# Patient Record
Sex: FEMALE | Race: OTHER | NOT HISPANIC OR LATINO | URBAN - METROPOLITAN AREA
[De-identification: names, ages, dates, MRNs, and addresses within clinical notes are randomized per-mention and may not be internally consistent; named-entity substitution may affect disease eponyms.]

---

## 2019-03-13 ENCOUNTER — INPATIENT (INPATIENT)
Facility: HOSPITAL | Age: 84
LOS: 12 days | Discharge: NURSING, REHAB OR LONG-TERM CARE | DRG: 291 | End: 2019-03-26
Attending: INTERNAL MEDICINE | Admitting: INTERNAL MEDICINE
Payer: MEDICARE

## 2019-03-13 VITALS
RESPIRATION RATE: 18 BRPM | WEIGHT: 128.97 LBS | HEIGHT: 60 IN | TEMPERATURE: 99 F | OXYGEN SATURATION: 100 % | DIASTOLIC BLOOD PRESSURE: 70 MMHG | HEART RATE: 69 BPM | SYSTOLIC BLOOD PRESSURE: 146 MMHG

## 2019-03-13 DIAGNOSIS — A41.9 SEPSIS, UNSPECIFIED ORGANISM: ICD-10-CM

## 2019-03-13 DIAGNOSIS — I50.9 HEART FAILURE, UNSPECIFIED: ICD-10-CM

## 2019-03-13 LAB
ALBUMIN SERPL ELPH-MCNC: 2.9 G/DL — LOW (ref 3.5–5)
ALP SERPL-CCNC: 52 U/L — SIGNIFICANT CHANGE UP (ref 40–120)
ALT FLD-CCNC: 14 U/L DA — SIGNIFICANT CHANGE UP (ref 10–60)
ANION GAP SERPL CALC-SCNC: 5 MMOL/L — SIGNIFICANT CHANGE UP (ref 5–17)
ANION GAP SERPL CALC-SCNC: 5 MMOL/L — SIGNIFICANT CHANGE UP (ref 5–17)
APTT BLD: 38.1 SEC — HIGH (ref 27.5–36.3)
AST SERPL-CCNC: 15 U/L — SIGNIFICANT CHANGE UP (ref 10–40)
BILIRUB SERPL-MCNC: 0.2 MG/DL — SIGNIFICANT CHANGE UP (ref 0.2–1.2)
BUN SERPL-MCNC: 77 MG/DL — HIGH (ref 7–18)
BUN SERPL-MCNC: 77 MG/DL — HIGH (ref 7–18)
CALCIUM SERPL-MCNC: 7.9 MG/DL — LOW (ref 8.4–10.5)
CALCIUM SERPL-MCNC: 8.4 MG/DL — SIGNIFICANT CHANGE UP (ref 8.4–10.5)
CHLORIDE SERPL-SCNC: 113 MMOL/L — HIGH (ref 96–108)
CHLORIDE SERPL-SCNC: 114 MMOL/L — HIGH (ref 96–108)
CO2 SERPL-SCNC: 23 MMOL/L — SIGNIFICANT CHANGE UP (ref 22–31)
CO2 SERPL-SCNC: 23 MMOL/L — SIGNIFICANT CHANGE UP (ref 22–31)
CREAT SERPL-MCNC: 1.98 MG/DL — HIGH (ref 0.5–1.3)
CREAT SERPL-MCNC: 1.98 MG/DL — HIGH (ref 0.5–1.3)
GLUCOSE SERPL-MCNC: 91 MG/DL — SIGNIFICANT CHANGE UP (ref 70–99)
GLUCOSE SERPL-MCNC: 96 MG/DL — SIGNIFICANT CHANGE UP (ref 70–99)
HCT VFR BLD CALC: 23.2 % — LOW (ref 34.5–45)
HGB BLD-MCNC: 7 G/DL — CRITICAL LOW (ref 11.5–15.5)
INR BLD: 1.08 RATIO — SIGNIFICANT CHANGE UP (ref 0.88–1.16)
LIDOCAIN IGE QN: 206 U/L — SIGNIFICANT CHANGE UP (ref 73–393)
MCHC RBC-ENTMCNC: 30.2 GM/DL — LOW (ref 32–36)
MCHC RBC-ENTMCNC: 31.1 PG — SIGNIFICANT CHANGE UP (ref 27–34)
MCV RBC AUTO: 103.1 FL — HIGH (ref 80–100)
NRBC # BLD: 0 /100 WBCS — SIGNIFICANT CHANGE UP (ref 0–0)
NT-PROBNP SERPL-SCNC: 8592 PG/ML — HIGH (ref 0–450)
OB PNL STL: NEGATIVE — SIGNIFICANT CHANGE UP
PLATELET # BLD AUTO: 155 K/UL — SIGNIFICANT CHANGE UP (ref 150–400)
POTASSIUM SERPL-MCNC: 5.3 MMOL/L — SIGNIFICANT CHANGE UP (ref 3.5–5.3)
POTASSIUM SERPL-MCNC: 5.4 MMOL/L — HIGH (ref 3.5–5.3)
POTASSIUM SERPL-SCNC: 5.3 MMOL/L — SIGNIFICANT CHANGE UP (ref 3.5–5.3)
POTASSIUM SERPL-SCNC: 5.4 MMOL/L — HIGH (ref 3.5–5.3)
PROT SERPL-MCNC: 6.6 G/DL — SIGNIFICANT CHANGE UP (ref 6–8.3)
PROTHROM AB SERPL-ACNC: 12 SEC — SIGNIFICANT CHANGE UP (ref 10–12.9)
RBC # BLD: 2.25 M/UL — LOW (ref 3.8–5.2)
RBC # FLD: 16.7 % — HIGH (ref 10.3–14.5)
SODIUM SERPL-SCNC: 141 MMOL/L — SIGNIFICANT CHANGE UP (ref 135–145)
SODIUM SERPL-SCNC: 142 MMOL/L — SIGNIFICANT CHANGE UP (ref 135–145)
TROPONIN I SERPL-MCNC: 0.06 NG/ML — HIGH (ref 0–0.04)
WBC # BLD: 4.06 K/UL — SIGNIFICANT CHANGE UP (ref 3.8–10.5)
WBC # FLD AUTO: 4.06 K/UL — SIGNIFICANT CHANGE UP (ref 3.8–10.5)

## 2019-03-13 PROCEDURE — 71045 X-RAY EXAM CHEST 1 VIEW: CPT | Mod: 26

## 2019-03-13 PROCEDURE — 99285 EMERGENCY DEPT VISIT HI MDM: CPT

## 2019-03-13 PROCEDURE — 93970 EXTREMITY STUDY: CPT | Mod: 26

## 2019-03-13 RX ORDER — AMLODIPINE BESYLATE 2.5 MG/1
10 TABLET ORAL DAILY
Qty: 0 | Refills: 0 | Status: DISCONTINUED | OUTPATIENT
Start: 2019-03-14 | End: 2019-03-26

## 2019-03-13 RX ORDER — HYDRALAZINE HCL 50 MG
25 TABLET ORAL THREE TIMES A DAY
Qty: 0 | Refills: 0 | Status: DISCONTINUED | OUTPATIENT
Start: 2019-03-14 | End: 2019-03-26

## 2019-03-13 RX ORDER — LOSARTAN POTASSIUM 100 MG/1
2 TABLET, FILM COATED ORAL
Qty: 0 | Refills: 0 | COMMUNITY

## 2019-03-13 RX ORDER — HYDRALAZINE HCL 50 MG
1 TABLET ORAL
Qty: 0 | Refills: 0 | COMMUNITY

## 2019-03-13 RX ORDER — ALBUTEROL 90 UG/1
3 AEROSOL, METERED ORAL
Qty: 0 | Refills: 0 | COMMUNITY

## 2019-03-13 RX ORDER — LOSARTAN POTASSIUM 100 MG/1
1 TABLET, FILM COATED ORAL
Qty: 0 | Refills: 0 | COMMUNITY

## 2019-03-13 RX ORDER — FUROSEMIDE 40 MG
40 TABLET ORAL DAILY
Qty: 0 | Refills: 0 | Status: DISCONTINUED | OUTPATIENT
Start: 2019-03-14 | End: 2019-03-14

## 2019-03-13 RX ORDER — FUROSEMIDE 40 MG
40 TABLET ORAL ONCE
Qty: 0 | Refills: 0 | Status: COMPLETED | OUTPATIENT
Start: 2019-03-13 | End: 2019-03-13

## 2019-03-13 RX ORDER — IPRATROPIUM/ALBUTEROL SULFATE 18-103MCG
3 AEROSOL WITH ADAPTER (GRAM) INHALATION ONCE
Qty: 0 | Refills: 0 | Status: COMPLETED | OUTPATIENT
Start: 2019-03-13 | End: 2019-03-13

## 2019-03-13 RX ORDER — HEPARIN SODIUM 5000 [USP'U]/ML
5000 INJECTION INTRAVENOUS; SUBCUTANEOUS EVERY 8 HOURS
Qty: 0 | Refills: 0 | Status: DISCONTINUED | OUTPATIENT
Start: 2019-03-13 | End: 2019-03-14

## 2019-03-13 RX ORDER — AMLODIPINE BESYLATE 2.5 MG/1
1 TABLET ORAL
Qty: 0 | Refills: 0 | COMMUNITY

## 2019-03-13 RX ORDER — ERGOCALCIFEROL 1.25 MG/1
50000 CAPSULE ORAL
Qty: 0 | Refills: 0 | Status: DISCONTINUED | OUTPATIENT
Start: 2019-03-13 | End: 2019-03-18

## 2019-03-13 RX ORDER — SODIUM POLYSTYRENE SULFONATE 4.1 MEQ/G
15 POWDER, FOR SUSPENSION ORAL ONCE
Qty: 0 | Refills: 0 | Status: COMPLETED | OUTPATIENT
Start: 2019-03-13 | End: 2019-03-13

## 2019-03-13 RX ORDER — ALBUTEROL 90 UG/1
2 AEROSOL, METERED ORAL EVERY 6 HOURS
Qty: 0 | Refills: 0 | Status: DISCONTINUED | OUTPATIENT
Start: 2019-03-13 | End: 2019-03-26

## 2019-03-13 RX ORDER — PANTOPRAZOLE SODIUM 20 MG/1
40 TABLET, DELAYED RELEASE ORAL
Qty: 0 | Refills: 0 | Status: DISCONTINUED | OUTPATIENT
Start: 2019-03-13 | End: 2019-03-26

## 2019-03-13 RX ORDER — ACETAMINOPHEN 500 MG
650 TABLET ORAL EVERY 8 HOURS
Qty: 0 | Refills: 0 | Status: DISCONTINUED | OUTPATIENT
Start: 2019-03-13 | End: 2019-03-26

## 2019-03-13 RX ORDER — SODIUM POLYSTYRENE SULFONATE 4.1 MEQ/G
15 POWDER, FOR SUSPENSION ORAL DAILY
Qty: 0 | Refills: 0 | Status: DISCONTINUED | OUTPATIENT
Start: 2019-03-13 | End: 2019-03-13

## 2019-03-13 RX ORDER — ACETAMINOPHEN 500 MG
2 TABLET ORAL
Qty: 0 | Refills: 0 | COMMUNITY

## 2019-03-13 RX ADMIN — Medication 650 MILLIGRAM(S): at 22:00

## 2019-03-13 RX ADMIN — Medication 40 MILLIGRAM(S): at 13:20

## 2019-03-13 RX ADMIN — Medication 650 MILLIGRAM(S): at 21:12

## 2019-03-13 RX ADMIN — Medication 3 MILLILITER(S): at 13:20

## 2019-03-13 NOTE — H&P ADULT - HISTORY OF PRESENT ILLNESS
89 y/o F from [facility] assisted living w/ PMHx of DVT, PE, CVA presents to ED due to shortness of breath, chest pain for 2 days. Pt reports she is treated every 4 hours, and that shortness of breath started 1 month ago once she moved to [facility]. Pt also complaints of bilateral leg swelling worsening over the past 1 month. Pt on lasix for CHF, abl eto ambulate with walker. Pt otherwise denies dark tarry stools, nausea, vomiting, diarrhea, all other ROS negative.  Pt is on Lasix for CHF. 89 y/o F from [facility] assisted living w/ PMHx of GERD, anemia, colitis, rectal bleeding, chronic ankle swelling, presents to ED due to shortness of breath for 2 days. Pt reports she is treated every 4 hours with albuterol nebulizers in the past 2 days, and that shortness of breath started 1 month ago once she moved to [facility]. Pt also complaints of bilateral leg swelling worsening over the past 1 month. Per daughter, patient has had hx of anemia, with colonoscopy 8 years ago, however daughter does not remember results, and does not recall that patient was continued on any iron supplements.  Patient had had an episode of bright red blood per rectum several months ago, however denying any current episode, denying dark tarry stools, nausea, vomiting, diarrhea, all others ROS negaitve.  Pt on lasix for chornic bilateral ankle swelling, however unclear if she has hx of CHF as daughter does not know. Pt able to ambulate with walker. Daughter states patient PCP prior to [facility] is Dr. Aleman who would know further information, contact numbers listed above.

## 2019-03-13 NOTE — H&P ADULT - NSICDXFAMILYHX_GEN_ALL_CORE_FT
No pertinent family history in first degree relatives FAMILY HISTORY:  No pertinent family history in first degree relatives

## 2019-03-13 NOTE — H&P ADULT - ASSESSMENT
Patient admitted for shortness of breath, likely 2/2 CHF exacerbation given elevated proBNP and bilateral pleural effusions, patient also found to have symptomatic anemia, however guaiac negative Patient admitted for shortness of breath, likely 2/2 CHF exacerbation given elevated proBNP and bilateral pleural effusions, patient also found to have symptomatic anemia, however guaiac negative.    Pt is FULL CODE Patient admitted for shortness of breath, likely 2/2 CHF exacerbation given elevated proBNP and bilateral pleural effusions, patient also found to have symptomatic anemia, however guaiac negative, macrocytic, f/u B12 and folate levels in AM    Pt is FULL CODE    PRIMARY TEAM TO CALL PREVIOUS PCP DR. ALEMAN IN AM FOR FURTHER MEDICAL HISTORY INFORMATION AS WELL AS BASELINE CREATININE  Dr. Aleman [phone], [phone]      Problem 1: dyspnea  acute CHF exacerbation vs COPD, unknown if pt smoked in the past, however currently denying  s/p 40mg IV lasix, c/w 40mg IV lasix daily for now  c/w duonebs q6h  CXR shows bilateral pleural effusions, elevated proBNP on admission  continue to monitor BMP and fluid status  c/w telemetry monitoring for now  initial troponin 0.063  f/u T2, T3  f/u echocardiogram  f/u cardiology consult - Dr. Medina  f/u pulmonology consult - Dr. Dumont    Problem 2: symptomatic anemia  hemoglobin 7 on admission, guaiac negative, macrocytic on admission CBC, may indicate Vitamin B12 vs folate deficiency  s/p 1U PRBC transfusion (started prior to anemia panel collection)  f/u repeat CBC post transfusion at 12 AM  f/u CBC in AM  f/u Vit B12, folate in AM    Problem 3: HTN  c/w hydralazine TID, amlodipine, holding losartan given creatinine 1.77, unknown baseline  continue to monitor BP    Problem 4: CKD  DENEEN on CKD vs worsening CKD?  unknown baseline creatinine  will continue with IV diuresis as above  f/u urine lytes  f/u BMP in AM  PRIMARY TEAM TO ESTABLISH BASELINE CREATININE WITH PREVIOUS PCP Dr. Aleman as above    Problem 5: GERD  c/w protonix daily    Problem 6: Need for prophylactic measure  IMPROVE VTE Individual Risk Assessment          RISK                                                          Points  [  ] Previous VTE                                                3  [  ] Thrombophilia                                             2  [ x ] Lower limb paralysis                                   2        (unable to hold up >15 seconds)    [  ] Current Cancer                                             2         (within 6 months)  [ x ] Immobilization > 24 hrs                              1  [  ] ICU/CCU stay > 24 hours                             1  [ x ] Age > 60                                                         1    IMPROVE VTE Score: 4    c/w heparin for vte prophylaxis

## 2019-03-13 NOTE — H&P ADULT - NSHPLABSRESULTS_GEN_ALL_CORE
LABS:      CBC Full  -  ( 13 Mar 2019 13:16 )  WBC Count : 4.06 K/uL  Hemoglobin : 7.0 g/dL  Hematocrit : 23.2 %  Platelet Count - Automated : 155 K/uL  Mean Cell Volume : 103.1 fl  Mean Cell Hemoglobin : 31.1 pg  Mean Cell Hemoglobin Concentration : 30.2 gm/dL  Auto Neutrophil # : x  Auto Lymphocyte # : x  Auto Monocyte # : x  Auto Eosinophil # : x  Auto Basophil # : x  Auto Neutrophil % : x  Auto Lymphocyte % : x  Auto Monocyte % : x  Auto Eosinophil % : x  Auto Basophil % : x    03-13    142  |  114<H>  |  77<H>  ----------------------------<  96  5.4<H>   |  23  |  1.98<H>    Ca    7.9<L>      13 Mar 2019 13:16    TPro  6.6  /  Alb  2.9<L>  /  TBili  0.2  /  DBili  x   /  AST  15  /  ALT  14  /  AlkPhos  52  03-13    PT/INR - ( 13 Mar 2019 13:16 )   PT: 12.0 sec;   INR: 1.08 ratio       PTT - ( 13 Mar 2019 13:16 )  PTT:38.1 sec    RADIOLOGY & ADDITIONAL STUDIES (The following images were personally reviewed):    EXAM:  XR CHEST AP OR PA 1V                          PROCEDURE DATE:  03/13/2019      INTERPRETATION:  CLINICAL STATEMENT: Chest pain.    TECHNIQUE: AP view of the chest.    COMPARISON: None available.    FINDINGS/  IMPRESSION:  Increased interstitial lung markings with nonspecific prominence right   hilum which could be related to pulmonary vasculature. Underlying mass or   lymphadenopathy not excluded.    Small bilateral pleural effusions with adjacent airspace opacities.    Heart size cannot be accurately assessed in this projection, but appear   enlarged.

## 2019-03-13 NOTE — ED ADULT NURSE NOTE - NSIMPLEMENTINTERV_GEN_ALL_ED
Implemented All Universal Safety Interventions:  [place] to call system. Call bell, personal items and telephone within reach. Instruct patient to call for assistance. Room bathroom lighting operational. Non-slip footwear when patient is off stretcher. Physically safe environment: no spills, clutter or unnecessary equipment. Stretcher in lowest position, wheels locked, appropriate side rails in place.

## 2019-03-13 NOTE — ED PROVIDER NOTE - PROGRESS NOTE DETAILS
Pt's BNP severely elevated with CHF findings on CXR--Lasix IV given.  Troponin elevated with no ischemic abnormality on EKG.  Also with severe anemia.  D/w Pt and daughter (the HCP) and initially they had reservations about blood transfusion but at about 3:30 pm, they agree to consent for transfusion.  Given the aforementioned, will hold ASA.  Not suspecting ACS.  Informed Dr. Baum. Dr. Maloney, MAR, informed.

## 2019-03-13 NOTE — H&P ADULT - NSHPPHYSICALEXAM_GEN_ALL_CORE
Vital Signs Last 24 Hrs  T(C): 36.2 (13 Mar 2019 15:26), Max: 37 (13 Mar 2019 11:40)  T(F): 97.1 (13 Mar 2019 15:26), Max: 98.6 (13 Mar 2019 11:40)  HR: 66 (13 Mar 2019 17:41) (61 - 69)  BP: 141/75 (13 Mar 2019 17:41) (137/54 - 146/70)  RR: 17 (13 Mar 2019 17:41) (16 - 18)  SpO2: 100% (13 Mar 2019 17:41) (100% - 100%)  ________________________________________________  PHYSICAL EXAM:  GENERAL: NAD  HEENT: Normocephalic;  conjunctivae and sclerae clear; moist mucous membranes;   NECK : supple, no JVD  CHEST/LUNG: right-sided inspiratory crackles   HEART: S1 S2  regular; no murmurs, gallops or rubs  ABDOMEN: Soft, Nontender, Nondistended; Bowel sounds present  EXTREMITIES: no cyanosis; no edema; no calf tenderness  NERVOUS SYSTEM:  Awake and alert; Oriented  to place, person Vital Signs Last 24 Hrs  T(C): 36.2 (13 Mar 2019 15:26), Max: 37 (13 Mar 2019 11:40)  T(F): 97.1 (13 Mar 2019 15:26), Max: 98.6 (13 Mar 2019 11:40)  HR: 66 (13 Mar 2019 17:41) (61 - 69)  BP: 141/75 (13 Mar 2019 17:41) (137/54 - 146/70)  RR: 17 (13 Mar 2019 17:41) (16 - 18)  SpO2: 100% (13 Mar 2019 17:41) (100% - 100%)  ________________________________________________  PHYSICAL EXAM:  GENERAL: NAD  HEENT: Normocephalic;  conjunctivae and sclerae clear; moist mucous membranes;   NECK : supple, no JVD  CHEST/LUNG: right-sided inspiratory crackles, no wheezing  HEART: S1 S2  regular; no murmurs, gallops or rubs  ABDOMEN: Soft, Nontender, Nondistended; Bowel sounds present  EXTREMITIES: no cyanosis; trace bilateral pitting LE edema; no calf tenderness  NERVOUS SYSTEM:  Awake and alert; Oriented  to place, person

## 2019-03-13 NOTE — ED PROVIDER NOTE - PMH
CHF (congestive heart failure)    CVA (cerebral vascular accident)    DVT (deep venous thrombosis)    Pulmonary embolism

## 2019-03-13 NOTE — H&P ADULT - NSICDXPASTMEDICALHX_GEN_ALL_CORE_FT
PAST MEDICAL HISTORY:  CHF (congestive heart failure)     CVA (cerebral vascular accident)     DVT (deep venous thrombosis)     Pulmonary embolism PAST MEDICAL HISTORY:  Anemia     CHF (congestive heart failure)     Colitis     H/O gastroesophageal reflux (GERD)     Rectal bleeding

## 2019-03-13 NOTE — ED PROVIDER NOTE - CARE PLAN
Principal Discharge DX:	Acute on chronic congestive heart failure, unspecified heart failure type  Secondary Diagnosis:	Severe anemia  Secondary Diagnosis:	Hyperkalemia  Secondary Diagnosis:	Renal insufficiency

## 2019-03-13 NOTE — H&P ADULT - ATTENDING COMMENTS
Patient is seen and examined. Case reviewed with the medical team. Above note is appreciated. Will follow up clinically. Continue DVT prophylaxis. Will follow up clinically with cardiology and pulmonary.

## 2019-03-14 LAB
24R-OH-CALCIDIOL SERPL-MCNC: 31 NG/ML — SIGNIFICANT CHANGE UP (ref 30–80)
ANION GAP SERPL CALC-SCNC: 5 MMOL/L — SIGNIFICANT CHANGE UP (ref 5–17)
ANION GAP SERPL CALC-SCNC: 6 MMOL/L — SIGNIFICANT CHANGE UP (ref 5–17)
ANION GAP SERPL CALC-SCNC: 6 MMOL/L — SIGNIFICANT CHANGE UP (ref 5–17)
APPEARANCE UR: CLEAR — SIGNIFICANT CHANGE UP
BASE EXCESS BLDA CALC-SCNC: -5 MMOL/L — LOW (ref -2–2)
BASE EXCESS BLDA CALC-SCNC: -6.6 MMOL/L — LOW (ref -2–2)
BILIRUB UR-MCNC: NEGATIVE — SIGNIFICANT CHANGE UP
BLOOD GAS COMMENTS ARTERIAL: SIGNIFICANT CHANGE UP
BUN SERPL-MCNC: 77 MG/DL — HIGH (ref 7–18)
BUN SERPL-MCNC: 81 MG/DL — HIGH (ref 7–18)
BUN SERPL-MCNC: 81 MG/DL — HIGH (ref 7–18)
CALCIUM SERPL-MCNC: 8.2 MG/DL — LOW (ref 8.4–10.5)
CALCIUM SERPL-MCNC: 8.4 MG/DL — SIGNIFICANT CHANGE UP (ref 8.4–10.5)
CALCIUM SERPL-MCNC: 8.6 MG/DL — SIGNIFICANT CHANGE UP (ref 8.4–10.5)
CHLORIDE SERPL-SCNC: 112 MMOL/L — HIGH (ref 96–108)
CHLORIDE SERPL-SCNC: 114 MMOL/L — HIGH (ref 96–108)
CHLORIDE SERPL-SCNC: 114 MMOL/L — HIGH (ref 96–108)
CHLORIDE UR-SCNC: 90 MMOL/L — SIGNIFICANT CHANGE UP (ref 55–125)
CHOLEST SERPL-MCNC: 146 MG/DL — SIGNIFICANT CHANGE UP (ref 10–199)
CK MB BLD-MCNC: 2.8 % — SIGNIFICANT CHANGE UP (ref 0–3.5)
CK MB CFR SERPL CALC: 3.7 NG/ML — HIGH (ref 0–3.6)
CK SERPL-CCNC: 132 U/L — SIGNIFICANT CHANGE UP (ref 21–215)
CO2 SERPL-SCNC: 22 MMOL/L — SIGNIFICANT CHANGE UP (ref 22–31)
CO2 SERPL-SCNC: 23 MMOL/L — SIGNIFICANT CHANGE UP (ref 22–31)
CO2 SERPL-SCNC: 23 MMOL/L — SIGNIFICANT CHANGE UP (ref 22–31)
COLOR SPEC: YELLOW — SIGNIFICANT CHANGE UP
CREAT SERPL-MCNC: 2.03 MG/DL — HIGH (ref 0.5–1.3)
CREAT SERPL-MCNC: 2.04 MG/DL — HIGH (ref 0.5–1.3)
CREAT SERPL-MCNC: 2.08 MG/DL — HIGH (ref 0.5–1.3)
DIFF PNL FLD: NEGATIVE — SIGNIFICANT CHANGE UP
FOLATE SERPL-MCNC: 18.7 NG/ML — SIGNIFICANT CHANGE UP
GLUCOSE BLDC GLUCOMTR-MCNC: 76 MG/DL — SIGNIFICANT CHANGE UP (ref 70–99)
GLUCOSE SERPL-MCNC: 78 MG/DL — SIGNIFICANT CHANGE UP (ref 70–99)
GLUCOSE SERPL-MCNC: 85 MG/DL — SIGNIFICANT CHANGE UP (ref 70–99)
GLUCOSE SERPL-MCNC: 92 MG/DL — SIGNIFICANT CHANGE UP (ref 70–99)
GLUCOSE UR QL: NEGATIVE — SIGNIFICANT CHANGE UP
HBA1C BLD-MCNC: 4.8 % — SIGNIFICANT CHANGE UP (ref 4–5.6)
HCO3 BLDA-SCNC: 21 MMOL/L — LOW (ref 23–27)
HCO3 BLDA-SCNC: 21 MMOL/L — LOW (ref 23–27)
HCT VFR BLD CALC: 29.9 % — LOW (ref 34.5–45)
HCT VFR BLD CALC: 30.7 % — LOW (ref 34.5–45)
HDLC SERPL-MCNC: 103 MG/DL — SIGNIFICANT CHANGE UP
HGB BLD-MCNC: 9 G/DL — LOW (ref 11.5–15.5)
HGB BLD-MCNC: 9.3 G/DL — LOW (ref 11.5–15.5)
HOROWITZ INDEX BLDA+IHG-RTO: 50 — SIGNIFICANT CHANGE UP
HOROWITZ INDEX BLDA+IHG-RTO: 50 — SIGNIFICANT CHANGE UP
INR BLD: 1.04 RATIO — SIGNIFICANT CHANGE UP (ref 0.88–1.16)
KETONES UR-MCNC: NEGATIVE — SIGNIFICANT CHANGE UP
LEUKOCYTE ESTERASE UR-ACNC: NEGATIVE — SIGNIFICANT CHANGE UP
LIPID PNL WITH DIRECT LDL SERPL: 38 MG/DL — SIGNIFICANT CHANGE UP
MAGNESIUM SERPL-MCNC: 2.5 MG/DL — SIGNIFICANT CHANGE UP (ref 1.6–2.6)
MAGNESIUM SERPL-MCNC: 2.6 MG/DL — SIGNIFICANT CHANGE UP (ref 1.6–2.6)
MAGNESIUM SERPL-MCNC: 2.6 MG/DL — SIGNIFICANT CHANGE UP (ref 1.6–2.6)
MCHC RBC-ENTMCNC: 30.1 GM/DL — LOW (ref 32–36)
MCHC RBC-ENTMCNC: 30.3 GM/DL — LOW (ref 32–36)
MCHC RBC-ENTMCNC: 30.3 PG — SIGNIFICANT CHANGE UP (ref 27–34)
MCHC RBC-ENTMCNC: 30.5 PG — SIGNIFICANT CHANGE UP (ref 27–34)
MCV RBC AUTO: 100.7 FL — HIGH (ref 80–100)
MCV RBC AUTO: 100.7 FL — HIGH (ref 80–100)
NITRITE UR-MCNC: NEGATIVE — SIGNIFICANT CHANGE UP
NRBC # BLD: 0 /100 WBCS — SIGNIFICANT CHANGE UP (ref 0–0)
NRBC # BLD: 0 /100 WBCS — SIGNIFICANT CHANGE UP (ref 0–0)
OSMOLALITY UR: 350 MOS/KG — SIGNIFICANT CHANGE UP (ref 50–1200)
PCO2 BLDA: 43 MMHG — SIGNIFICANT CHANGE UP (ref 32–46)
PCO2 BLDA: 54 MMHG — HIGH (ref 32–46)
PH BLDA: 7.21 — LOW (ref 7.35–7.45)
PH BLDA: 7.31 — LOW (ref 7.35–7.45)
PH UR: 5 — SIGNIFICANT CHANGE UP (ref 5–8)
PHOSPHATE SERPL-MCNC: 5.7 MG/DL — HIGH (ref 2.5–4.5)
PHOSPHATE SERPL-MCNC: 5.9 MG/DL — HIGH (ref 2.5–4.5)
PHOSPHATE SERPL-MCNC: 6.1 MG/DL — HIGH (ref 2.5–4.5)
PLATELET # BLD AUTO: 160 K/UL — SIGNIFICANT CHANGE UP (ref 150–400)
PLATELET # BLD AUTO: 168 K/UL — SIGNIFICANT CHANGE UP (ref 150–400)
PO2 BLDA: 138 MMHG — HIGH (ref 74–108)
PO2 BLDA: 168 MMHG — HIGH (ref 74–108)
POTASSIUM SERPL-MCNC: 5.1 MMOL/L — SIGNIFICANT CHANGE UP (ref 3.5–5.3)
POTASSIUM SERPL-MCNC: 5.4 MMOL/L — HIGH (ref 3.5–5.3)
POTASSIUM SERPL-MCNC: 5.5 MMOL/L — HIGH (ref 3.5–5.3)
POTASSIUM SERPL-SCNC: 5.1 MMOL/L — SIGNIFICANT CHANGE UP (ref 3.5–5.3)
POTASSIUM SERPL-SCNC: 5.4 MMOL/L — HIGH (ref 3.5–5.3)
POTASSIUM SERPL-SCNC: 5.5 MMOL/L — HIGH (ref 3.5–5.3)
PROT ?TM UR-MCNC: 33 MG/DL — HIGH (ref 0–12)
PROT UR-MCNC: 30 MG/DL
PROTHROM AB SERPL-ACNC: 11.6 SEC — SIGNIFICANT CHANGE UP (ref 10–12.9)
RBC # BLD: 2.97 M/UL — LOW (ref 3.8–5.2)
RBC # BLD: 3.05 M/UL — LOW (ref 3.8–5.2)
RBC # FLD: 17.8 % — HIGH (ref 10.3–14.5)
RBC # FLD: 18.3 % — HIGH (ref 10.3–14.5)
SAO2 % BLDA: 99 % — HIGH (ref 92–96)
SAO2 % BLDA: 99 % — HIGH (ref 92–96)
SODIUM SERPL-SCNC: 141 MMOL/L — SIGNIFICANT CHANGE UP (ref 135–145)
SODIUM SERPL-SCNC: 142 MMOL/L — SIGNIFICANT CHANGE UP (ref 135–145)
SODIUM SERPL-SCNC: 142 MMOL/L — SIGNIFICANT CHANGE UP (ref 135–145)
SODIUM UR-SCNC: 77 MMOL/L — SIGNIFICANT CHANGE UP (ref 40–220)
SP GR SPEC: 1.01 — SIGNIFICANT CHANGE UP (ref 1.01–1.02)
TOTAL CHOLESTEROL/HDL RATIO MEASUREMENT: 1.4 RATIO — LOW (ref 3.3–7.1)
TRIGL SERPL-MCNC: 25 MG/DL — SIGNIFICANT CHANGE UP (ref 10–149)
TROPONIN I SERPL-MCNC: 0.06 NG/ML — HIGH (ref 0–0.04)
TSH SERPL-MCNC: 3.74 UU/ML — SIGNIFICANT CHANGE UP (ref 0.34–4.82)
UROBILINOGEN FLD QL: NEGATIVE — SIGNIFICANT CHANGE UP
VIT B12 SERPL-MCNC: 1029 PG/ML — SIGNIFICANT CHANGE UP (ref 232–1245)
WBC # BLD: 4.24 K/UL — SIGNIFICANT CHANGE UP (ref 3.8–10.5)
WBC # BLD: 5.39 K/UL — SIGNIFICANT CHANGE UP (ref 3.8–10.5)
WBC # FLD AUTO: 4.24 K/UL — SIGNIFICANT CHANGE UP (ref 3.8–10.5)
WBC # FLD AUTO: 5.39 K/UL — SIGNIFICANT CHANGE UP (ref 3.8–10.5)

## 2019-03-14 PROCEDURE — 93306 TTE W/DOPPLER COMPLETE: CPT | Mod: 26

## 2019-03-14 PROCEDURE — 71045 X-RAY EXAM CHEST 1 VIEW: CPT | Mod: 26

## 2019-03-14 RX ORDER — FUROSEMIDE 40 MG
40 TABLET ORAL ONCE
Qty: 0 | Refills: 0 | Status: COMPLETED | OUTPATIENT
Start: 2019-03-14 | End: 2019-03-14

## 2019-03-14 RX ORDER — ALBUTEROL 90 UG/1
2.5 AEROSOL, METERED ORAL
Qty: 0 | Refills: 0 | Status: COMPLETED | OUTPATIENT
Start: 2019-03-14 | End: 2019-03-14

## 2019-03-14 RX ORDER — DEXTROSE 50 % IN WATER 50 %
50 SYRINGE (ML) INTRAVENOUS ONCE
Qty: 0 | Refills: 0 | Status: COMPLETED | OUTPATIENT
Start: 2019-03-14 | End: 2019-03-14

## 2019-03-14 RX ORDER — SODIUM POLYSTYRENE SULFONATE 4.1 MEQ/G
30 POWDER, FOR SUSPENSION ORAL ONCE
Qty: 0 | Refills: 0 | Status: COMPLETED | OUTPATIENT
Start: 2019-03-14 | End: 2019-03-14

## 2019-03-14 RX ORDER — FUROSEMIDE 40 MG
40 TABLET ORAL EVERY 12 HOURS
Qty: 0 | Refills: 0 | Status: DISCONTINUED | OUTPATIENT
Start: 2019-03-14 | End: 2019-03-17

## 2019-03-14 RX ORDER — CALCIUM ACETATE 667 MG
667 TABLET ORAL
Qty: 0 | Refills: 0 | Status: DISCONTINUED | OUTPATIENT
Start: 2019-03-14 | End: 2019-03-26

## 2019-03-14 RX ORDER — EPINEPHRINE 11.25MG/ML
0.5 SOLUTION, NON-ORAL INHALATION ONCE
Qty: 0 | Refills: 0 | Status: COMPLETED | OUTPATIENT
Start: 2019-03-14 | End: 2019-03-14

## 2019-03-14 RX ORDER — DEXTROSE 50 % IN WATER 50 %
25 SYRINGE (ML) INTRAVENOUS ONCE
Qty: 0 | Refills: 0 | Status: COMPLETED | OUTPATIENT
Start: 2019-03-14 | End: 2019-03-14

## 2019-03-14 RX ORDER — CHLORHEXIDINE GLUCONATE 213 G/1000ML
1 SOLUTION TOPICAL EVERY 12 HOURS
Qty: 0 | Refills: 0 | Status: DISCONTINUED | OUTPATIENT
Start: 2019-03-14 | End: 2019-03-26

## 2019-03-14 RX ORDER — CALCIUM GLUCONATE 100 MG/ML
1 VIAL (ML) INTRAVENOUS ONCE
Qty: 0 | Refills: 0 | Status: COMPLETED | OUTPATIENT
Start: 2019-03-14 | End: 2019-03-14

## 2019-03-14 RX ORDER — INSULIN HUMAN 100 [IU]/ML
10 INJECTION, SOLUTION SUBCUTANEOUS ONCE
Qty: 0 | Refills: 0 | Status: COMPLETED | OUTPATIENT
Start: 2019-03-14 | End: 2019-03-14

## 2019-03-14 RX ORDER — HEPARIN SODIUM 5000 [USP'U]/ML
5000 INJECTION INTRAVENOUS; SUBCUTANEOUS EVERY 12 HOURS
Qty: 0 | Refills: 0 | Status: DISCONTINUED | OUTPATIENT
Start: 2019-03-14 | End: 2019-03-26

## 2019-03-14 RX ADMIN — HEPARIN SODIUM 5000 UNIT(S): 5000 INJECTION INTRAVENOUS; SUBCUTANEOUS at 18:30

## 2019-03-14 RX ADMIN — Medication 40 MILLIGRAM(S): at 18:30

## 2019-03-14 RX ADMIN — Medication 40 MILLIGRAM(S): at 09:49

## 2019-03-14 RX ADMIN — ALBUTEROL 2.5 MILLIGRAM(S): 90 AEROSOL, METERED ORAL at 10:17

## 2019-03-14 RX ADMIN — Medication 0.5 MILLILITER(S): at 11:01

## 2019-03-14 RX ADMIN — ALBUTEROL 2.5 MILLIGRAM(S): 90 AEROSOL, METERED ORAL at 09:48

## 2019-03-14 RX ADMIN — CHLORHEXIDINE GLUCONATE 1 APPLICATION(S): 213 SOLUTION TOPICAL at 18:30

## 2019-03-14 RX ADMIN — Medication 25 MILLILITER(S): at 10:33

## 2019-03-14 NOTE — CONSULT NOTE ADULT - SUBJECTIVE AND OBJECTIVE BOX
Patient is a 89y old  Female who presents with a chief complaint of shortness of breath (14 Mar 2019 09:44)      INTERVAL HPI/ OVERNIGHT EVENTS: HPI:  89 y/o F from [facility] assisted living w/ PMHx of GERD, anemia, colitis, rectal bleeding, chronic ankle swelling, presents to ED due to shortness of breath for 2 days. Pt reports she is treated every 4 hours with albuterol nebulizers in the past 2 days, and that shortness of breath started 1 month ago once she moved to [facility]. Pt also complaints of bilateral leg swelling worsening over the past 1 month. Per daughter, patient has had hx of anemia, with colonoscopy 8 years ago, however daughter does not remember results, and does not recall that patient was continued on any iron supplements.  Patient had had an episode of bright red blood per rectum several months ago, however denying any current episode, denying dark tarry stools, nausea, vomiting, diarrhea, all others ROS negative  Pt on lasix for chornic bilateral ankle swelling, however unclear if she has hx of CHF as daughter does not know. Pt able to ambulate with walker. Daughter states patient PCP prior to [facility] is Dr. Aleman who would know further information, contact numbers listed above. (13 Mar 2019 18:43)    HOSPITAL COURSE: Pt was admitted yesterday for evaluation of SOB, and was being managed for pulmonary edema with IV lasix. Pt has been refusing to take oral medications sice admission. This AM pt refused to take IV lasix. Later on she was agitated and was refusing to take any medications including IV and oral. Pt got 1 dose of 40 mg of IV lasix around 945 AM. Pt also refused to take medications for hyperkalemia and so K levels are persistently elevated. Pt later became more lethargic and was having expiratory wheeze on exam, finger stick was checked which was 76mg/dl. Pt was given 25 gm of D50 and was given duoneb nebs x3 and pt became more awake. ABG was performed which s/o ABG - pH, Arterial: 7.21  pH, Blood: x     /  pCO2: 54    /  pO2: 138   / HCO3: 21    / Base Excess: -6.6  /  SaO2: 99  s/o  hypercapneic resp failure with uncompensated respiratory acidosis.   Pt will be placed on BIPAP for Hypercapnic resp failure likely secondary to pulmonary edema.   Pt will be transferred to ICU for further management.     T(C): 36.7 (03-14-19 @ 09:47), Max: 37 (03-13-19 @ 11:40)  HR: 68 (03-14-19 @ 10:19) (61 - 69)  BP: 175/55 (03-14-19 @ 10:19) (137/54 - 187/56)  RR: 22 (03-14-19 @ 10:19) (16 - 22)  SpO2: 100% (03-14-19 @ 10:19) (90% - 100%)  Wt(kg): --  I&O's Summary      PAST MEDICAL & SURGICAL HISTORY:  Rectal bleeding  H/O gastroesophageal reflux (GERD)  Colitis  Anemia  CHF (congestive heart failure)  No significant past surgical history        LABS:                        9.3    4.24  )-----------( 168      ( 14 Mar 2019 06:39 )             30.7     03-14    141  |  112<H>  |  77<H>  ----------------------------<  78  5.4<H>   |  23  |  2.03<H>    Ca    8.6      14 Mar 2019 06:39  Phos  6.1     03-14  Mg     2.6     03-14    TPro  6.6  /  Alb  2.9<L>  /  TBili  0.2  /  DBili  x   /  AST  15  /  ALT  14  /  AlkPhos  52  03-13    PT/INR - ( 13 Mar 2019 13:16 )   PT: 12.0 sec;   INR: 1.08 ratio         PTT - ( 13 Mar 2019 13:16 )  PTT:38.1 sec    CAPILLARY BLOOD GLUCOSE      POCT Blood Glucose.: 76 mg/dL (14 Mar 2019 10:15)    ABG - ( 14 Mar 2019 10:36 )  pH, Arterial: 7.21  pH, Blood: x     /  pCO2: 54    /  pO2: 138   / HCO3: 21    / Base Excess: -6.6  /  SaO2: 99                      MEDICATIONS  (STANDING):  amLODIPine   Tablet 10 milliGRAM(s) Oral daily  calcium acetate 667 milliGRAM(s) Oral four times a day with meals  ergocalciferol 11221 Unit(s) Oral <User Schedule>  furosemide   Injectable 40 milliGRAM(s) IV Push daily  heparin  Injectable 5000 Unit(s) SubCutaneous every 12 hours  hydrALAZINE 25 milliGRAM(s) Oral three times a day  methylPREDNISolone sodium succinate Injectable 40 milliGRAM(s) IV Push once  pantoprazole    Tablet 40 milliGRAM(s) Oral before breakfast    MEDICATIONS  (PRN):  acetaminophen   Tablet .. 650 milliGRAM(s) Oral every 8 hours PRN Moderate Pain (4 - 6)  ALBUTerol    90 MICROgram(s) HFA Inhaler 2 Puff(s) Inhalation every 6 hours PRN Shortness of Breath and/or Wheezing      REVIEW OF SYSTEMS:  Pt is drowsy but able to follow commands, denies any SOB though she is using accessory muscles.  Denies any chest pain, dizziness, abdominal pain, headache.     RADIOLOGY & ADDITIONAL TESTS: < from: Xray Chest 1 View AP/PA (03.13.19 @ 12:53) >  EXAM:  XR CHEST AP OR PA 1V                            PROCEDURE DATE:  03/13/2019          INTERPRETATION:  CLINICAL STATEMENT: Chest pain.    TECHNIQUE: AP view of the chest.    COMPARISON: None available.    FINDINGS/  IMPRESSION:  Increased interstitial lung markings with nonspecific prominence right   hilum which could be related to pulmonary vasculature. Underlying mass or   lymphadenopathy not excluded.    Small bilateral pleural effusions with adjacent airspace opacities.    Heart size cannot be accurately assessed in this projection, but appear   enlarged.    < end of copied text >      Imaging Personally Reviewed:  [x ] YES  [ ] NO    Consultant(s) Notes Reviewed:  [x ] YES  [ ] NO    PHYSICAL EXAM:  GENERAL: drowsy arousable, in mild- moderate respiratory distress.  HEAD:  Atraumatic, Normocephalic  EYES: pupils mid dilated and reactive to light bilaterally  ENMT: Moist mucous membranes  NECK: Supple  NERVOUS SYSTEM:  drowsy, arousable, able to follow commands, no focal neuro deficits.  CHEST/LUNG: Bilaterally air entry+, bilateral basal rales with expiratory wheeze  HEART: Regular rate and rhythm;  ABDOMEN: Soft, Nontender, Nondistended; Bowel sounds present  EXTREMITIES:  2+ Peripheral Pulses, bilateral 2+ pitting pedal edema with erythema noted Patient is a 89y old  Female who presents with a chief complaint of shortness of breath (14 Mar 2019 09:44)    INTERVAL HPI/ OVERNIGHT EVENTS: HPI:  89 y/o F from [facility] assisted living w/ PMHx of GERD, anemia, colitis, rectal bleeding, chronic ankle swelling, presents to ED due to shortness of breath for 2 days. Pt reports she is treated every 4 hours with albuterol nebulizers in the past 2 days, and that shortness of breath started 1 month ago once she moved to [facility]. Pt also complaints of bilateral leg swelling worsening over the past 1 month. Per daughter, patient has had hx of anemia, with colonoscopy 8 years ago, however daughter does not remember results, and does not recall that patient was continued on any iron supplements.  Patient had had an episode of bright red blood per rectum several months ago, however denying any current episode, denying dark tarry stools, nausea, vomiting, diarrhea, all others ROS negative  Pt on lasix for chornic bilateral ankle swelling, however unclear if she has hx of CHF as daughter does not know. Pt able to ambulate with walker. Daughter states patient PCP prior to [facility] is Dr. Aleman who would know further information, contact numbers listed above. (13 Mar 2019 18:43)    HOSPITAL COURSE: Pt was admitted yesterday for evaluation of SOB, and was being managed for pulmonary edema with IV lasix. Pt has been refusing to take oral medications since admission. This AM pt refused to take IV lasix. Later on she was agitated and was refusing to take any medications including IV and oral. Pt got 1 dose of 40 mg of IV lasix around 945 AM. Pt also refused to take medications for hyperkalemia and so K levels are persistently elevated. Pt later became more lethargic and was having expiratory wheeze on exam, finger stick was checked which was 76mg/dl. Pt was given 25 gm of D50 and was given duoneb nebs x3 and pt became more awake. ABG was performed which s/o ABG - pH, Arterial: 7.21  pH, Blood: x     /  pCO2: 54    /  pO2: 138   / HCO3: 21    / Base Excess: -6.6  /  SaO2: 99  s/o  hypercapneic resp failure with uncompensated respiratory acidosis.   Pt will be placed on BIPAP for Hypercapnic resp failure likely secondary to pulmonary edema.   Pt will be transferred to ICU for further management.     T(C): 36.7 (03-14-19 @ 09:47), Max: 37 (03-13-19 @ 11:40)  HR: 68 (03-14-19 @ 10:19) (61 - 69)  BP: 175/55 (03-14-19 @ 10:19) (137/54 - 187/56)  RR: 22 (03-14-19 @ 10:19) (16 - 22)  SpO2: 100% (03-14-19 @ 10:19) (90% - 100%)  Wt(kg): --  I&O's Summary    PAST MEDICAL & SURGICAL HISTORY:  Rectal bleeding  H/O gastroesophageal reflux (GERD)  Colitis  Anemia  CHF (congestive heart failure)  No significant past surgical history    FAMILY HISTORY:  No pertinent family history in first degree relatives    SOCIAL HISTORY: denies smoking, alcohol, illicit drug use    LABS:                        9.3    4.24  )-----------( 168      ( 14 Mar 2019 06:39 )             30.7     03-14    141  |  112<H>  |  77<H>  ----------------------------<  78  5.4<H>   |  23  |  2.03<H>    Ca    8.6      14 Mar 2019 06:39  Phos  6.1     03-14  Mg     2.6     03-14    TPro  6.6  /  Alb  2.9<L>  /  TBili  0.2  /  DBili  x   /  AST  15  /  ALT  14  /  AlkPhos  52  03-13    PT/INR - ( 13 Mar 2019 13:16 )   PT: 12.0 sec;   INR: 1.08 ratio         PTT - ( 13 Mar 2019 13:16 )  PTT:38.1 sec    CAPILLARY BLOOD GLUCOSE      POCT Blood Glucose.: 76 mg/dL (14 Mar 2019 10:15)    ABG - ( 14 Mar 2019 10:36 )  pH, Arterial: 7.21  pH, Blood: x     /  pCO2: 54    /  pO2: 138   / HCO3: 21    / Base Excess: -6.6  /  SaO2: 99        MEDICATIONS  (STANDING):  amLODIPine   Tablet 10 milliGRAM(s) Oral daily  calcium acetate 667 milliGRAM(s) Oral four times a day with meals  ergocalciferol 97793 Unit(s) Oral <User Schedule>  furosemide   Injectable 40 milliGRAM(s) IV Push daily  heparin  Injectable 5000 Unit(s) SubCutaneous every 12 hours  hydrALAZINE 25 milliGRAM(s) Oral three times a day  methylPREDNISolone sodium succinate Injectable 40 milliGRAM(s) IV Push once  pantoprazole    Tablet 40 milliGRAM(s) Oral before breakfast    MEDICATIONS  (PRN):  acetaminophen   Tablet .. 650 milliGRAM(s) Oral every 8 hours PRN Moderate Pain (4 - 6)  ALBUTerol    90 MICROgram(s) HFA Inhaler 2 Puff(s) Inhalation every 6 hours PRN Shortness of Breath and/or Wheezing      REVIEW OF SYSTEMS:  Pt is drowsy but able to follow commands, denies any SOB though she is using accessory muscles.  Denies any chest pain, dizziness, abdominal pain, headache.     RADIOLOGY & ADDITIONAL TESTS: < from: Xray Chest 1 View AP/PA (03.13.19 @ 12:53) >  EXAM:  XR CHEST AP OR PA 1V                            PROCEDURE DATE:  03/13/2019          INTERPRETATION:  CLINICAL STATEMENT: Chest pain.    TECHNIQUE: AP view of the chest.    COMPARISON: None available.    FINDINGS/  IMPRESSION:  Increased interstitial lung markings with nonspecific prominence right   hilum which could be related to pulmonary vasculature. Underlying mass or   lymphadenopathy not excluded.    Small bilateral pleural effusions with adjacent airspace opacities.    Heart size cannot be accurately assessed in this projection, but appear   enlarged.    < end of copied text >      Imaging Personally Reviewed:  [x ] YES  [ ] NO    Consultant(s) Notes Reviewed:  [x ] YES  [ ] NO    PHYSICAL EXAM:  GENERAL: drowsy arousable, in mild- moderate respiratory distress.  HEAD:  Atraumatic, Normocephalic  EYES: pupils mid dilated and reactive to light bilaterally  ENMT: Moist mucous membranes  NECK: Supple  NERVOUS SYSTEM:  drowsy, arousable, able to follow commands, no focal neuro deficits.  CHEST/LUNG: Bilaterally air entry+, bilateral basal rales with expiratory wheeze  HEART: Regular rate and rhythm;  ABDOMEN: Soft, Nontender, Nondistended; Bowel sounds present  EXTREMITIES:  2+ Peripheral Pulses, bilateral 2+ pitting pedal edema with erythema noted

## 2019-03-14 NOTE — CONSULT NOTE ADULT - ASSESSMENT
89 y/o F from [facility] assisted [facility] w/ PMHx of GERD, HTN, anemia, colitis, rectal bleeding, chronic ankle swelling, presents to ED due to shortness of breath for 2 days admitted for management of pulmonary edema.   Pt became more lethargic and was having expiratory wheeze on exam, finger stick was checked which was 76mg/dl. Pt was given 25 gm of D50 and was given duoneb nebs x3 and pt became more awake. ABG was performed which s/o ABG - pH, Arterial: 7.21  pH, Blood: x     /  pCO2: 54    /  pO2: 138   / HCO3: 21    / Base Excess: -6.6  /  SaO2: 99  s/o  hypercapneic resp failure with uncompensated respiratory acidosis. CXR s/o pulmonary congestion  Pt will be transferred to ICU for management of  Hypercapnic resp failure likely secondary to pulmonary edema and will be placed on NIV BIPAP.     Discussed GOC with daughter Ms. Briscoe over phone [phone], who wants the pt to be FULL CODE.    1) Respiratory failure with hypercapnea:  Pt was tachypneic this AM.  ABG s/o uncompensated respiratory acidosis which could be secondary to pulmonary edema may have contributed to encephalopathy.  currently pt is more awake, following verbal commands  CXR s/o mild pulmonary congestion  O/E- pt has bilateral rales and occasional expiratory wheeze.  Bilateral pitting pedal edema on exam, elevated BNP  will continue with IV lasix 40 mg but will increase the dose to 40 mg IV BID  continue with NIV BIPAP support  serial ABGs  strict I/Os  Daily weight.    2) Pulmonary edema  Pt is tachypneic with CXR s/o mild pulmonary congestion  O/E- pt has bilateral rales and occasional expiratory wheeze.  Bilateral pitting pedal edema on exam, elevated BNP  currently on lasix 40 mg daily, will increase the dose to 40mg BID  strict I/Os  Daily weight.  f/u echo.  cardiology- Dr. Medina    3) DENEEN   PT has BUN/ creat of 77/2.03  could have underlying CKD, unknown baseline.   Pt also has hyperkalemia with no EKG changes, pt received lasix this AM refused to take cocktail for hyperkalemia.  will get repeat BMP to check potassium levels.  f/u urine lytes  f/u [facility]  Nephrology- Dr Guerrero    4) HTN  Pt has Hx of HTN, will continue home dose of hydralazine, norvasc and lasix  monitor BP closely    5) Anemia  PT has Hb 7.0 on admission, which improved to 9.3 s/p 1 PRBC  stool occult negative  pt has hx of rectal bleed, but no episodes of melena or hematemesis since admission  macrocytic anemia but Vit b12 and folate levels are WNL  could have some underlying MDS or MM, will check SPEP, UPEP  will monitor CBC daily.  complete anemia profile was not sent on admission prior to PRBC transfusion.    6) need for prophylactic measure  DVT ppx with heparin SQ  GI ppx with protonix for GERD.    7) GOC discussion  Discussed GOC with daughter Ms. Briscoe over phone [phone], who wants the pt to be FULL CODE.

## 2019-03-14 NOTE — CONSULT NOTE ADULT - SUBJECTIVE AND OBJECTIVE BOX
PULMONARY CONSULT NOTE      DEEDEE YOUNGBLOOD  MRN-[id number]    HISTORY OF PRESENT ILLNESS:    89 y/o F from [facility] assisted living w/ PMHx of GERD, anemia, colitis, rectal bleeding, chronic ankle swelling, presents to ED due to shortness of breath for 2 days. Pt reports she is treated every 4 hours with albuterol nebulizers in the past 2 days, and that shortness of breath started 1 month ago once she moved to [facility]. Pt also complaints of bilateral leg swelling worsening over the past 1 month. Per daughter, patient has had hx of anemia, with colonoscopy 8 years ago, however daughter does not remember results, and does not recall that patient was continued on any iron supplements.  Patient had had an episode of bright red blood per rectum several months ago, however denying any current episode, denying dark tarry stools, nausea, vomiting, diarrhea, all others ROS negative  Pt on lasix for chornic bilateral ankle swelling, however unclear if she has hx of CHF as daughter does not know. Pt able to ambulate with walker. Daughter states patient PCP prior to [facility] is Dr. Aleman who would know further information, contact numbers listed above. (13 Mar 2019 18:43)    HOSPITAL COURSE: Pt was admitted yesterday for evaluation of SOB, and was being managed for pulmonary edema with IV lasix. Pt has been refusing to take oral medications since admission. This AM pt refused to take IV lasix. Later on she was agitated and was refusing to take any medications including IV and oral. Pt got 1 dose of 40 mg of IV lasix around 945 AM. Pt also refused to take medications for hyperkalemia and so K levels are persistently elevated. Pt later became more lethargic and was having expiratory wheeze on exam, finger stick was checked which was 76mg/dl. Pt was given 25 gm of D50 and was given duoneb nebs x3 and pt became more awake. ABG was performed which s/o ABG - pH, Arterial: 7.21  pH, Blood: x     /  pCO2: 54    /  pO2: 138   / HCO3: 21    / Base Excess: -6.6  /  SaO2: 99  s/o  hypercapnic resp failure with uncompensated respiratory acidosis.   Pt will be placed on BIPAP for Hypercapnic resp failure likely secondary to pulmonary edema.   Pt will be transferred to ICU for further management.       MEDICATIONS  (STANDING):  amLODIPine   Tablet 10 milliGRAM(s) Oral daily  calcium acetate 667 milliGRAM(s) Oral four times a day with meals  chlorhexidine 4% Liquid 1 Application(s) Topical every 12 hours  ergocalciferol 85089 Unit(s) Oral <User Schedule>  furosemide   Injectable 40 milliGRAM(s) IV Push every 12 hours  heparin  Injectable 5000 Unit(s) SubCutaneous every 12 hours  hydrALAZINE 25 milliGRAM(s) Oral three times a day  pantoprazole    Tablet 40 milliGRAM(s) Oral before breakfast      MEDICATIONS  (PRN):  acetaminophen   Tablet .. 650 milliGRAM(s) Oral every 8 hours PRN Moderate Pain (4 - 6)  ALBUTerol    90 MICROgram(s) HFA Inhaler 2 Puff(s) Inhalation every 6 hours PRN Shortness of Breath and/or Wheezing      Allergies    iodine (Unknown)  meperidine (Rash)  oxycodone (Other; Nausea)    Intolerances        PAST MEDICAL & SURGICAL HISTORY:  Rectal bleeding  H/O gastroesophageal reflux (GERD)  Colitis  Anemia  CHF (congestive heart failure)  No significant past surgical history      FAMILY HISTORY:  No pertinent family history in first degree relatives      SOCIAL HISTORY  Smoking History:     REVIEW OF SYSTEMS:    CONSTITUTIONAL:  No fevers, chills, sweats    HEENT:  Eyes:  No diplopia or blurred vision. ENT:  No earache, sore throat or runny nose.    CARDIOVASCULAR:  No pressure, squeezing, tightness, or heaviness about the chest; no palpitations.    RESPIRATORY:  Per HPI    GASTROINTESTINAL:  No abdominal pain, nausea, vomiting or diarrhea.    GENITOURINARY:  No dysuria, frequency or urgency.    NEUROLOGIC:  No paresthesias, fasciculations, seizures or weakness.    PSYCHIATRIC:  No disorder of thought or mood.    Vital Signs Last 24 Hrs  T(C): 36.7 (14 Mar 2019 09:47), Max: 36.7 (14 Mar 2019 09:47)  T(F): 98.1 (14 Mar 2019 09:47), Max: 98.1 (14 Mar 2019 09:47)  HR: 69 (14 Mar 2019 11:52) (61 - 69)  BP: 175/55 (14 Mar 2019 10:19) (137/54 - 187/56)  BP(mean): 91 (14 Mar 2019 09:47) (91 - 91)  RR: 22 (14 Mar 2019 10:19) (16 - 22)  SpO2: 100% (14 Mar 2019 11:52) (90% - 100%)  I&O's Detail      PHYSICAL EXAMINATION:    GENERAL: The patient is a well-developed, well-nourished, no apparent distress.     HEENT: Head is normocephalic and atraumatic. Extraocular muscles are intact. Mucous membranes are moist.     NECK: Supple.     LUNGS: Clear to auscultation without wheezing, rales, or rhonchi. Respirations unlabored    HEART: Regular rate and rhythm without murmur.    ABDOMEN: Soft, nontender, and nondistended.  No hepatosplenomegaly is noted.    EXTREMITIES: Without any cyanosis, clubbing, rash, lesions or edema.    NEUROLOGIC: Grossly intact.      LABS:                        9.3    4.24  )-----------( 168      ( 14 Mar 2019 06:39 )             30.7     03-14    141  |  112<H>  |  77<H>  ----------------------------<  78  5.4<H>   |  23  |  2.03<H>    Ca    8.6      14 Mar 2019 06:39  Phos  6.1     03-14  Mg     2.6     03-14    TPro  6.6  /  Alb  2.9<L>  /  TBili  0.2  /  DBili  x   /  AST  15  /  ALT  14  /  AlkPhos  52  03-13    PT/INR - ( 13 Mar 2019 13:16 )   PT: 12.0 sec;   INR: 1.08 ratio         PTT - ( 13 Mar 2019 13:16 )  PTT:38.1 sec    ABG - ( 14 Mar 2019 10:36 )  pH, Arterial: 7.21  pH, Blood: x     /  pCO2: 54    /  pO2: 138   / HCO3: 21    / Base Excess: -6.6  /  SaO2: 99                CARDIAC MARKERS ( 14 Mar 2019 06:39 )  0.062 ng/mL / x     / 132 U/L / x     / 3.7 ng/mL  CARDIAC MARKERS ( 13 Mar 2019 13:16 )  0.063 ng/mL / x     / x     / x     / x            Serum Pro-Brain Natriuretic Peptide: 8592 pg/mL (03-13-19 @ 13:16)          MICROBIOLOGY:    RADIOLOGY & ADDITIONAL STUDIES:    < from: US Duplex Venous Lower Ext Complete, Bilateral (03.13.19 @ 14:38) >  IMPRESSION:     No evidence of deep venous thrombosis.    < end of copied text >      CXR:  < from: Xray Chest 1 View AP/PA (03.13.19 @ 12:53) >  IMPRESSION:  Increased interstitial lung markings with nonspecific prominence right   hilum which could be related to pulmonary vasculature. Underlying mass or   lymphadenopathy not excluded.    Small bilateral pleural effusions with adjacent airspace opacities.    Heart size cannot be accurately assessed in this projection, but appear   enlarged.    < end of copied text >    Ct scan chest:    ekg;    echo:

## 2019-03-14 NOTE — CONSULT NOTE ADULT - ASSESSMENT
1. Hypercapnic Respiratory Failure  - Likely 2nd to pulmonary edema.     XR shows increased interstitial lung markings with nonspecific right hilum prominence - unable to r/o mass or lymphadenopathy on XR.   - Transfer to ICU   - Start on BIPAP  - Bronchodilators  - Steroids  - IV Lasix    - DVT PPX     2. Renal Insufficiency - Chronic  - Monitor labs.  - BUN 77, Creat 2.03.   - Renal F/U  - Continue meds  - Avoid nephrotoxic drugs.     3. Hyperkalemia   - K+ 5.4   - Monitor CMP  - Renal F/U   - GI PPX 1. Hypercapnic Respiratory Failure  - Likely 2nd to pulmonary edema.     XR shows increased interstitial lung markings with nonspecific right hilum prominence - unable to r/o mass or lymphadenopathy on XR.   - Transfer to ICU   - Start on BIPAP  - Bronchodilators  - Steroids  - IV Lasix    - DVT PPX  -Follow up CXR     2. Renal Insufficiency - Chronic  - Monitor labs.  - BUN 77, Creat 2.03.   - Renal F/U  - Continue meds  - Avoid nephrotoxic drugs.     3. Hyperkalemia   - K+ 5.4   - Monitor CMP  - Renal F/U   - GI PPX

## 2019-03-14 NOTE — CONSULT NOTE ADULT - ASSESSMENT
89 y/o F from [facility] assisted [facility] w/ PMHx of GERD, anemia, colitis, rectal bleeding, chronic ankle swelling, presents to ED due to shortness of breath for 2 days.  1.Echocardiogram.  2.Psych eval for agitation.  3.IV lasix.  4.CRI and hyperkalemia-Lasix,Renal eval.  5.DM-Insulin.  6.Check spep,rpr,vit b12.  7.HTN-cont bp medication.  8.GI and DVT prophylaxis.

## 2019-03-14 NOTE — DIETITIAN INITIAL EVALUATION ADULT. - PERTINENT LABORATORY DATA
03-14 Na142 mmol/L Glu 85 mg/dL K+ 5.1 mmol/L Cr  2.04 mg/dL<H> BUN 81 mg/dL<H> 03-14 Phos 5.9 mg/dL<H> 03-13 Alb 2.9 g/dL<L> 03-14 [id number] 4.8 % 03-14 Chol 146 mg/dL LDL 38 mg/dL  mg/dL Trig 25 mg/dL

## 2019-03-14 NOTE — PROGRESS NOTE ADULT - SUBJECTIVE AND OBJECTIVE BOX
CHIEF COMPLAINT:Patient is a 89y old  Female who presents with a chief complaint of shortness of breath (14 Mar 2019 12:16)    	  REVIEW OF SYSTEMS:  CONSTITUTIONAL: No fever, weight loss, or fatigue  EYES: No eye pain, visual disturbances, or discharge  ENMT:  No difficulty hearing, tinnitus, vertigo; No sinus or throat pain  NECK: No pain or stiffness  RESPIRATORY: No cough, wheezing, chills or hemoptysis; No Shortness of Breath  CARDIOVASCULAR: No chest pain, palpitations, passing out, dizziness, or leg swelling  GASTROINTESTINAL: No abdominal or epigastric pain. No nausea, vomiting, or hematemesis; No diarrhea or constipation. No melena or hematochezia.  GENITOURINARY: No dysuria, frequency, hematuria, or incontinence  NEUROLOGICAL: No headaches, memory loss, loss of strength, numbness, or tremors  SKIN: No itching, burning, rashes, or lesions   LYMPH Nodes: No enlarged glands  ENDOCRINE: No heat or cold intolerance; No hair loss  MUSCULOSKELETAL: No joint pain or swelling; No muscle, back, or extremity pain  PSYCHIATRIC: No depression, anxiety, mood swings, or difficulty sleeping  HEME/LYMPH: No easy bruising, or bleeding gums  ALLERY AND IMMUNOLOGIC: No hives or eczema	    [ ] All others negative	  [ ] Unable to obtain    PHYSICAL EXAM:  T(C): 35.8 (19 @ 20:00), Max: 37 (19 @ 12:56)  HR: 64 (19 @ 21:00) (58 - 70)  BP: 135/46 (19 @ 21:00) (123/90 - 187/56)  RR: 16 (19 @ 21:00) (13 - 22)  SpO2: 100% (19 @ 21:00) (90% - 100%)  Wt(kg): --  I&O's Summary    14 Mar 2019 07:01  -  14 Mar 2019 23:19  --------------------------------------------------------  IN: 0 mL / OUT: 640 mL / NET: -640 mL        Appearance: on BiPAP in the ICU, minimally responsive  HEENT:   PERRL  Lymphatic: No lymphadenopathy  Cardiovascular: Normal S1 S2, No JVD, No murmurs,   Respiratory: Lungs With scattered Rhonchi  Psychiatry: A & O x 0  Gastrointestinal:  Soft, Non-tender, + BS	  Skin: No rashes, No ecchymoses, No cyanosis	  Neurologic: unable to assess  Extremities:, No clubbing, cyanosis or edema  Vascular: Peripheral pulses palpable 2+ bilaterally    MEDICATIONS  (STANDING):  amLODIPine   Tablet 10 milliGRAM(s) Oral daily  calcium acetate 667 milliGRAM(s) Oral four times a day with meals  chlorhexidine 4% Liquid 1 Application(s) Topical every 12 hours  ergocalciferol 06634 Unit(s) Oral <User Schedule>  furosemide   Injectable 40 milliGRAM(s) IV Push every 12 hours  heparin  Injectable 5000 Unit(s) SubCutaneous every 12 hours  hydrALAZINE 25 milliGRAM(s) Oral three times a day  pantoprazole    Tablet 40 milliGRAM(s) Oral before breakfast      TELEMETRY: 	    ECG:  	  RADIOLOGY:  OTHER: 	  	  CBC Full  -  ( 14 Mar 2019 06:39 )  WBC Count : 4.24 K/uL  Hemoglobin : 9.3 g/dL  Hematocrit : 30.7 %  Platelet Count - Automated : 168 K/uL  Mean Cell Volume : 100.7 fl  Mean Cell Hemoglobin : 30.5 pg  Mean Cell Hemoglobin Concentration : 30.3 gm/dL  Auto Neutrophil # : x  Auto Lymphocyte # : x  Auto Monocyte # : x  Auto Eosinophil # : x  Auto Basophil # : x  Auto Neutrophil % : x  Auto Lymphocyte % : x  Auto Monocyte % : x  Auto Eosinophil % : x  Auto Basophil % : x        CARDIAC MARKERS:  CARDIAC MARKERS ( 14 Mar 2019 06:39 )  0.062 ng/mL / x     / 132 U/L / x     / 3.7 ng/mL  CARDIAC MARKERS ( 13 Mar 2019 13:16 )  0.063 ng/mL / x     / x     / x     / x                                  9.3    4.24  )-----------( 168      ( 14 Mar 2019 06:39 )             30.7       03-14    142  |  114<H>  |  81<H>  ----------------------------<  85  5.1   |  22  |  2.04<H>    Ca    8.4      14 Mar 2019 13:57  Phos  5.9     03-14  Mg     2.6         TPro  6.6  /  Alb  2.9<L>  /  TBili  0.2  /  DBili  x   /  AST  15  /  ALT  14  /  AlkPhos  52        PT/INR - ( 14 Mar 2019 13:57 )   PT: 11.6 sec;   INR: 1.04 ratio         PTT - ( 13 Mar 2019 13:16 )  PTT:38.1 sec    Urinalysis Basic - ( 14 Mar 2019 13:45 )    Color: Yellow / Appearance: Clear / S.015 / pH: x  Gluc: x / Ketone: Negative  / Bili: Negative / Urobili: Negative   Blood: x / Protein: 30 mg/dL / Nitrite: Negative   Leuk Esterase: Negative / RBC: 0-2 /HPF / WBC 0-2 /HPF   Sq Epi: x / Non Sq Epi: Occasional /HPF / Bacteria: Trace /HPF      proBNP: Serum Pro-Brain Natriuretic Peptide: 8592 pg/mL ( @ 13:16)    Lipid Profile: Cholesterol 146  LDL 38    TG 25    HgA1c: Hemoglobin A1C, Whole Blood: 4.8 % ( @ 10:39)    TSH: Thyroid Stimulating Hormone, Serum: 3.74 uU/mL ( @ 06:39)    ABG - ( 14 Mar 2019 14:10 )  pH, Arterial: 7.31  pH, Blood: x     /  pCO2: 43    /  pO2: 168   / HCO3: 21    / Base Excess: -5.0  /  SaO2: 99

## 2019-03-14 NOTE — CONSULT NOTE ADULT - ATTENDING COMMENTS
Patient seen and examined with resident, Addendum to above.    89 y/o female with history of HTN, GERD admitted to the hospital for shortness of breath. Thought to be due to pulmonary edema. This morning ICU evaluation called for hypercapnia. Patient seen at bedside, mild respiratory distress. Diffuse wheezing noted. Following commands, denies any chest pain and palpitations. Moving all four extremities upper and lower extremity strength 4/5 bilaterally.     Assessment:  1. Acute hypercapnic respiratory failure - likely due to pulmonary edema. Unclear if has history of CHF. Will obtain Echo. Place patient on bipap support. Control BP. Diuresis with IV lasix. Monitor I&O. Cardiology follow up.   2. Hypertension - Cont. Home BP medications  3. Acute renal dysfunction - unknown baseline Cr. Check urine electrolytes and US of the kidneys. Monitor urine out put with villalobos catheter.   4. GERD: Cont. PPI  5. Vitamin D deficiency: Continue supplementation    - Admit patient to ICU   - DVT prophylaxis   - Goals of care discussion with family.   - Dr. Baum aware

## 2019-03-14 NOTE — CONSULT NOTE ADULT - SUBJECTIVE AND OBJECTIVE BOX
CHIEF COMPLAINT:Patient is a 89y old  Female who presents with a chief complaint of shortness of breath.      HPI:  89 y/o F from [facility] assisted living w/ PMHx of GERD, anemia, colitis, rectal bleeding, chronic ankle swelling, presents to ED due to shortness of breath for 2 days. Pt reports she is treated every 4 hours with albuterol nebulizers in the past 2 days, and that shortness of breath started 1 month ago once she moved to [facility]. Pt also complaints of bilateral leg swelling worsening over the past 1 month. Per daughter, patient has had hx of anemia, with colonoscopy 8 years ago, however daughter does not remember results, and does not recall that patient was continued on any iron supplements.  Patient had had an episode of bright red blood per rectum several months ago, however denying any current episode, denying dark tarry stools, nausea, vomiting, diarrhea, all others ROS negaitve.  Pt on lasix for chornic bilateral ankle swelling, however unclear if she has hx of CHF as daughter does not know. Pt able to ambulate with walker. Daughter states patient PCP prior to [facility] is Dr. Aleman who would know further information, contact numbers listed above. (13 Mar 2019 18:43)      PAST MEDICAL & SURGICAL HISTORY:  Rectal bleeding  H/O gastroesophageal reflux (GERD)  Colitis  Anemia  CHF (congestive heart failure)        MEDICATIONS  (STANDING):  ALBUTerol    0.083% 2.5 milliGRAM(s) Nebulizer every 10 minutes  amLODIPine   Tablet 10 milliGRAM(s) Oral daily  calcium acetate 667 milliGRAM(s) Oral four times a day with meals  dextrose 50% Injectable 50 milliLiter(s) IV Push once  ergocalciferol 84043 Unit(s) Oral <User Schedule>  furosemide   Injectable 40 milliGRAM(s) IV Push daily  furosemide   Injectable 40 milliGRAM(s) IV Push once  heparin  Injectable 5000 Unit(s) SubCutaneous every 12 hours  hydrALAZINE 25 milliGRAM(s) Oral three times a day  insulin regular  human recombinant. 10 Unit(s) IV Push once  pantoprazole    Tablet 40 milliGRAM(s) Oral before breakfast  sodium polystyrene sulfonate Suspension 30 Gram(s) Oral once    MEDICATIONS  (PRN):  acetaminophen   Tablet .. 650 milliGRAM(s) Oral every 8 hours PRN Moderate Pain (4 - 6)  ALBUTerol    90 MICROgram(s) HFA Inhaler 2 Puff(s) Inhalation every 6 hours PRN Shortness of Breath and/or Wheezing      FAMILY HISTORY: unable to obtain      SOCIAL HISTORY:  unable to obtain    Allergies    iodine (Unknown)  meperidine (Rash)  oxycodone (Other; Nausea)    Intolerances    	    REVIEW OF SYSTEMS:  [x ] Unable to obtain    PHYSICAL EXAM:  T(C): 36.2 (03-14-19 @ 00:19), Max: 37 (03-13-19 @ 11:40)  HR: 66 (03-14-19 @ 00:19) (61 - 69)  BP: 142/46 (03-14-19 @ 00:19) (137/54 - 161/40)  RR: 17 (03-14-19 @ 00:19) (16 - 18)  SpO2: 97% (03-14-19 @ 00:19) (96% - 100%)      Appearance: Normal	  HEENT:   Normal oral mucosa, PERRL, EOMI	  Lymphatic: No lymphadenopathy  Cardiovascular: Normal S1 S2, No JVD, No murmurs, No edema  Respiratory: B/L ronchi  Gastrointestinal:  Soft, Non-tender, + BS	  Skin: No rashes, No ecchymoses, No cyanosis	  Extremities: Normal range of motion, No clubbing, cyanosis or edema  Vascular: Peripheral pulses palpable 2+ bilaterally        ECG:  	nsr,rbbb,lafb    	  LABS:	 	    CARDIAC MARKERS:  CARDIAC MARKERS ( 14 Mar 2019 06:39 )  0.062 ng/mL / x     / 132 U/L / x     / 3.7 ng/mL  CARDIAC MARKERS ( 13 Mar 2019 13:16 )  0.063 ng/mL / x     / x     / x     / x                             9.3    4.24  )-----------( 168      ( 14 Mar 2019 06:39 )             30.7     03-14    141  |  112<H>  |  77<H>  ----------------------------<  78  5.4<H>   |  23  |  2.03<H>    Ca    8.6      14 Mar 2019 06:39  Phos  6.1     03-14  Mg     2.6     03-14    TPro  6.6  /  Alb  2.9<L>  /  TBili  0.2  /  DBili  x   /  AST  15  /  ALT  14  /  AlkPhos  52  03-13    proBNP: Serum Pro-Brain Natriuretic Peptide: 8592 pg/mL (03-13 @ 13:16)    Lipid Profile: Cholesterol 146  LDL 38    TG 25      TSH: Thyroid Stimulating Hormone, Serum: 3.74 uU/mL (03-14 @ 06:39)      EXAM:  US DPLX LWR EXT VEINS COMPL BI                            PROCEDURE DATE:  03/13/2019          INTERPRETATION:  EXAM: BILATERAL LOWER EXTREMITY VENOUS DOPPLER.     CLINICAL INDICATION: Bilateral leg swelling, redness and tenderness.     TECHNIQUE: Gray scale sonography with color and spectral Doppler.     PRIOR EXAM: None.     FINDINGS:      There is evidence of flow with respiratory phasicity, complete   compression and augmentation involving the popliteal, femoral and common   femoral veins of both lower extremities.    No evidence of a thrombus is demonstrated involving the visualized calf   veins bilaterally.      IMPRESSION:     No evidence of deep venous thrombosis.    EXAM:  XR CHEST AP OR PA 1V                            PROCEDURE DATE:  03/13/2019          INTERPRETATION:  CLINICAL STATEMENT: Chest pain.    TECHNIQUE: AP view of the chest.    COMPARISON: None available.    FINDINGS/  IMPRESSION:  Increased interstitial lung markings with nonspecific prominence right   hilum which could be related to pulmonary vasculature. Underlying mass or   lymphadenopathy not excluded.    Small bilateral pleural effusions with adjacent airspace opacities.    Heart size cannot be accurately assessed in this projection, but appear   enlarged.

## 2019-03-14 NOTE — PROGRESS NOTE ADULT - ASSESSMENT
Patient admitted for shortness of breath, likely 2/2 CHF exacerbation given elevated proBNP and bilateral pleural effusions, patient also found to have symptomatic anemia, however guaiac negative, macrocytic, f/u B12 and folate levels in AM    Pt is FULL CODE    PRIMARY TEAM TO CALL PREVIOUS PCP DR. ALEMAN IN AM FOR FURTHER MEDICAL HISTORY INFORMATION AS WELL AS BASELINE CREATININE  Dr. Aleman [phone], [phone]      Problem 1: dyspnea  acute CHF exacerbation vs COPD, unknown if pt smoked in the past, however currently denying  s/p 40mg IV lasix, c/w 40mg IV lasix daily for now  c/w duonebs q6h  CXR shows bilateral pleural effusions, elevated proBNP on admission  continue to monitor BMP and fluid status  c/w telemetry monitoring for now  initial troponin 0.063  f/u T2, T3  f/u echocardiogram  f/u cardiology consult - Dr. Medina  f/u pulmonology consult - Dr. Dumont    Problem 2: symptomatic anemia  hemoglobin 7 on admission, guaiac negative, macrocytic on admission CBC, may indicate Vitamin B12 vs folate deficiency  s/p 1U PRBC transfusion (started prior to anemia panel collection)  f/u repeat CBC post transfusion at 12 AM  f/u CBC in AM  f/u Vit B12, folate in AM    Problem 3: HTN  c/w hydralazine TID, amlodipine, holding losartan given creatinine 1.77, unknown baseline  continue to monitor BP    Problem 4: CKD  DENEEN on CKD vs worsening CKD?  unknown baseline creatinine  will continue with IV diuresis as above  f/u urine lytes  f/u BMP in AM  PRIMARY TEAM TO ESTABLISH BASELINE CREATININE WITH PREVIOUS PCP Dr. Aleman as above    Problem 5: GERD  c/w protonix daily    Problem 6: Need for prophylactic measure  IMPROVE VTE Individual Risk Assessment          RISK                                                          Points  [  ] Previous VTE                                                3  [  ] Thrombophilia                                             2  [ x ] Lower limb paralysis                                   2        (unable to hold up >15 seconds)    [  ] Current Cancer                                             2         (within 6 months)  [ x ] Immobilization > 24 hrs                              1  [  ] ICU/CCU stay > 24 hours                             1  [ x ] Age > 60                                                         1    IMPROVE VTE Score: 4    c/w heparin for vte prophylaxis

## 2019-03-15 LAB
ALBUMIN SERPL ELPH-MCNC: 2.6 G/DL — LOW (ref 3.5–5)
ALP SERPL-CCNC: 82 U/L — SIGNIFICANT CHANGE UP (ref 40–120)
ALT FLD-CCNC: 54 U/L DA — SIGNIFICANT CHANGE UP (ref 10–60)
ANION GAP SERPL CALC-SCNC: 6 MMOL/L — SIGNIFICANT CHANGE UP (ref 5–17)
ANION GAP SERPL CALC-SCNC: 6 MMOL/L — SIGNIFICANT CHANGE UP (ref 5–17)
ANION GAP SERPL CALC-SCNC: 7 MMOL/L — SIGNIFICANT CHANGE UP (ref 5–17)
AST SERPL-CCNC: 35 U/L — SIGNIFICANT CHANGE UP (ref 10–40)
BASE EXCESS BLDA CALC-SCNC: -4.5 MMOL/L — LOW (ref -2–2)
BASOPHILS # BLD AUTO: 0.01 K/UL — SIGNIFICANT CHANGE UP (ref 0–0.2)
BASOPHILS NFR BLD AUTO: 0.3 % — SIGNIFICANT CHANGE UP (ref 0–2)
BILIRUB SERPL-MCNC: 0.2 MG/DL — SIGNIFICANT CHANGE UP (ref 0.2–1.2)
BLOOD GAS COMMENTS ARTERIAL: SIGNIFICANT CHANGE UP
BUN SERPL-MCNC: 79 MG/DL — HIGH (ref 7–18)
BUN SERPL-MCNC: 81 MG/DL — HIGH (ref 7–18)
BUN SERPL-MCNC: 82 MG/DL — HIGH (ref 7–18)
CALCIUM SERPL-MCNC: 8.2 MG/DL — LOW (ref 8.4–10.5)
CALCIUM SERPL-MCNC: 8.4 MG/DL — SIGNIFICANT CHANGE UP (ref 8.4–10.5)
CALCIUM SERPL-MCNC: 8.6 MG/DL — SIGNIFICANT CHANGE UP (ref 8.4–10.5)
CHLORIDE SERPL-SCNC: 111 MMOL/L — HIGH (ref 96–108)
CHLORIDE SERPL-SCNC: 114 MMOL/L — HIGH (ref 96–108)
CHLORIDE SERPL-SCNC: 117 MMOL/L — HIGH (ref 96–108)
CO2 SERPL-SCNC: 21 MMOL/L — LOW (ref 22–31)
CO2 SERPL-SCNC: 24 MMOL/L — SIGNIFICANT CHANGE UP (ref 22–31)
CO2 SERPL-SCNC: 25 MMOL/L — SIGNIFICANT CHANGE UP (ref 22–31)
CREAT ?TM UR-MCNC: 31 MG/DL — SIGNIFICANT CHANGE UP
CREAT SERPL-MCNC: 2.03 MG/DL — HIGH (ref 0.5–1.3)
CREAT SERPL-MCNC: 2.04 MG/DL — HIGH (ref 0.5–1.3)
CREAT SERPL-MCNC: 2.08 MG/DL — HIGH (ref 0.5–1.3)
EOSINOPHIL # BLD AUTO: 0.03 K/UL — SIGNIFICANT CHANGE UP (ref 0–0.5)
EOSINOPHIL NFR BLD AUTO: 0.8 % — SIGNIFICANT CHANGE UP (ref 0–6)
GLUCOSE SERPL-MCNC: 116 MG/DL — HIGH (ref 70–99)
GLUCOSE SERPL-MCNC: 48 MG/DL — LOW (ref 70–99)
GLUCOSE SERPL-MCNC: 58 MG/DL — LOW (ref 70–99)
HCO3 BLDA-SCNC: 22 MMOL/L — LOW (ref 23–27)
HCT VFR BLD CALC: 28.3 % — LOW (ref 34.5–45)
HGB BLD-MCNC: 8.5 G/DL — LOW (ref 11.5–15.5)
HOROWITZ INDEX BLDA+IHG-RTO: 40 — SIGNIFICANT CHANGE UP
IMM GRANULOCYTES NFR BLD AUTO: 0.5 % — SIGNIFICANT CHANGE UP (ref 0–1.5)
LYMPHOCYTES # BLD AUTO: 1.13 K/UL — SIGNIFICANT CHANGE UP (ref 1–3.3)
LYMPHOCYTES # BLD AUTO: 29.8 % — SIGNIFICANT CHANGE UP (ref 13–44)
MAGNESIUM SERPL-MCNC: 2.5 MG/DL — SIGNIFICANT CHANGE UP (ref 1.6–2.6)
MAGNESIUM SERPL-MCNC: 2.5 MG/DL — SIGNIFICANT CHANGE UP (ref 1.6–2.6)
MAGNESIUM SERPL-MCNC: 2.6 MG/DL — SIGNIFICANT CHANGE UP (ref 1.6–2.6)
MCHC RBC-ENTMCNC: 30 GM/DL — LOW (ref 32–36)
MCHC RBC-ENTMCNC: 30.7 PG — SIGNIFICANT CHANGE UP (ref 27–34)
MCV RBC AUTO: 102.2 FL — HIGH (ref 80–100)
MONOCYTES # BLD AUTO: 0.5 K/UL — SIGNIFICANT CHANGE UP (ref 0–0.9)
MONOCYTES NFR BLD AUTO: 13.2 % — SIGNIFICANT CHANGE UP (ref 2–14)
NEUTROPHILS # BLD AUTO: 2.1 K/UL — SIGNIFICANT CHANGE UP (ref 1.8–7.4)
NEUTROPHILS NFR BLD AUTO: 55.4 % — SIGNIFICANT CHANGE UP (ref 43–77)
NRBC # BLD: 0 /100 WBCS — SIGNIFICANT CHANGE UP (ref 0–0)
PCO2 BLDA: 54 MMHG — HIGH (ref 32–46)
PH BLDA: 7.24 — LOW (ref 7.35–7.45)
PHOSPHATE SERPL-MCNC: 5.1 MG/DL — HIGH (ref 2.5–4.5)
PHOSPHATE SERPL-MCNC: 5.1 MG/DL — HIGH (ref 2.5–4.5)
PHOSPHATE SERPL-MCNC: 5.5 MG/DL — HIGH (ref 2.5–4.5)
PLATELET # BLD AUTO: 162 K/UL — SIGNIFICANT CHANGE UP (ref 150–400)
PO2 BLDA: 136 MMHG — HIGH (ref 74–108)
POTASSIUM SERPL-MCNC: 5.3 MMOL/L — SIGNIFICANT CHANGE UP (ref 3.5–5.3)
POTASSIUM SERPL-MCNC: 5.3 MMOL/L — SIGNIFICANT CHANGE UP (ref 3.5–5.3)
POTASSIUM SERPL-MCNC: 5.5 MMOL/L — HIGH (ref 3.5–5.3)
POTASSIUM SERPL-SCNC: 5.3 MMOL/L — SIGNIFICANT CHANGE UP (ref 3.5–5.3)
POTASSIUM SERPL-SCNC: 5.3 MMOL/L — SIGNIFICANT CHANGE UP (ref 3.5–5.3)
POTASSIUM SERPL-SCNC: 5.5 MMOL/L — HIGH (ref 3.5–5.3)
POTASSIUM UR-SCNC: 20 MMOL/L — LOW (ref 25–125)
PROT ?TM UR-MCNC: 26 MG/DL — HIGH (ref 0–12)
PROT SERPL-MCNC: 6.5 G/DL — SIGNIFICANT CHANGE UP (ref 6–8.3)
RBC # BLD: 2.77 M/UL — LOW (ref 3.8–5.2)
RBC # FLD: 17.7 % — HIGH (ref 10.3–14.5)
SAO2 % BLDA: 99 % — HIGH (ref 92–96)
SODIUM SERPL-SCNC: 143 MMOL/L — SIGNIFICANT CHANGE UP (ref 135–145)
SODIUM SERPL-SCNC: 144 MMOL/L — SIGNIFICANT CHANGE UP (ref 135–145)
SODIUM SERPL-SCNC: 144 MMOL/L — SIGNIFICANT CHANGE UP (ref 135–145)
URATE UR-MCNC: 6.2 MG/DL — SIGNIFICANT CHANGE UP
UUN UR-MCNC: 341 MG/DL — SIGNIFICANT CHANGE UP
WBC # BLD: 3.79 K/UL — LOW (ref 3.8–10.5)
WBC # FLD AUTO: 3.79 K/UL — LOW (ref 3.8–10.5)

## 2019-03-15 PROCEDURE — 93010 ELECTROCARDIOGRAM REPORT: CPT

## 2019-03-15 PROCEDURE — 71045 X-RAY EXAM CHEST 1 VIEW: CPT | Mod: 26

## 2019-03-15 RX ORDER — SODIUM POLYSTYRENE SULFONATE 4.1 MEQ/G
30 POWDER, FOR SUSPENSION ORAL ONCE
Qty: 0 | Refills: 0 | Status: COMPLETED | OUTPATIENT
Start: 2019-03-15 | End: 2019-03-15

## 2019-03-15 RX ORDER — DEXTROSE 50 % IN WATER 50 %
50 SYRINGE (ML) INTRAVENOUS ONCE
Qty: 0 | Refills: 0 | Status: COMPLETED | OUTPATIENT
Start: 2019-03-15 | End: 2019-03-15

## 2019-03-15 RX ORDER — CALCIUM GLUCONATE 100 MG/ML
1 VIAL (ML) INTRAVENOUS ONCE
Qty: 0 | Refills: 0 | Status: COMPLETED | OUTPATIENT
Start: 2019-03-15 | End: 2019-03-15

## 2019-03-15 RX ORDER — INSULIN HUMAN 100 [IU]/ML
10 INJECTION, SOLUTION SUBCUTANEOUS ONCE
Qty: 0 | Refills: 0 | Status: COMPLETED | OUTPATIENT
Start: 2019-03-15 | End: 2019-03-15

## 2019-03-15 RX ORDER — FUROSEMIDE 40 MG
20 TABLET ORAL ONCE
Qty: 0 | Refills: 0 | Status: COMPLETED | OUTPATIENT
Start: 2019-03-15 | End: 2019-03-15

## 2019-03-15 RX ADMIN — CHLORHEXIDINE GLUCONATE 1 APPLICATION(S): 213 SOLUTION TOPICAL at 17:50

## 2019-03-15 RX ADMIN — Medication 25 MILLIGRAM(S): at 22:44

## 2019-03-15 RX ADMIN — Medication 20 MILLIGRAM(S): at 17:50

## 2019-03-15 RX ADMIN — Medication 40 MILLIGRAM(S): at 20:39

## 2019-03-15 RX ADMIN — CHLORHEXIDINE GLUCONATE 1 APPLICATION(S): 213 SOLUTION TOPICAL at 06:26

## 2019-03-15 RX ADMIN — Medication 25 MILLIGRAM(S): at 14:10

## 2019-03-15 RX ADMIN — Medication 40 MILLIGRAM(S): at 06:44

## 2019-03-15 RX ADMIN — SODIUM POLYSTYRENE SULFONATE 30 GRAM(S): 4.1 POWDER, FOR SUSPENSION ORAL at 08:50

## 2019-03-15 RX ADMIN — Medication 40 MILLIGRAM(S): at 17:50

## 2019-03-15 RX ADMIN — HEPARIN SODIUM 5000 UNIT(S): 5000 INJECTION INTRAVENOUS; SUBCUTANEOUS at 06:44

## 2019-03-15 RX ADMIN — HEPARIN SODIUM 5000 UNIT(S): 5000 INJECTION INTRAVENOUS; SUBCUTANEOUS at 17:49

## 2019-03-15 RX ADMIN — Medication 200 GRAM(S): at 08:44

## 2019-03-15 RX ADMIN — AMLODIPINE BESYLATE 10 MILLIGRAM(S): 2.5 TABLET ORAL at 06:44

## 2019-03-15 RX ADMIN — PANTOPRAZOLE SODIUM 40 MILLIGRAM(S): 20 TABLET, DELAYED RELEASE ORAL at 06:45

## 2019-03-15 RX ADMIN — Medication 50 MILLILITER(S): at 08:45

## 2019-03-15 RX ADMIN — Medication 667 MILLIGRAM(S): at 12:29

## 2019-03-15 RX ADMIN — Medication 667 MILLIGRAM(S): at 22:45

## 2019-03-15 RX ADMIN — Medication 1 DROP(S): at 14:11

## 2019-03-15 RX ADMIN — INSULIN HUMAN 10 UNIT(S): 100 INJECTION, SOLUTION SUBCUTANEOUS at 08:45

## 2019-03-15 RX ADMIN — Medication 25 MILLIGRAM(S): at 06:45

## 2019-03-15 RX ADMIN — Medication 667 MILLIGRAM(S): at 08:49

## 2019-03-15 RX ADMIN — Medication 667 MILLIGRAM(S): at 17:50

## 2019-03-15 NOTE — PROGRESS NOTE ADULT - ASSESSMENT
89 y/o F from [facility] w/ PMHx of GERD, HTN, anemia, colitis, rectal bleeding, chronic ankle swelling, presents to ED due to shortness of breath for 2 days admitted for management of hypercapnic resp failure likely secondary to pulmonary edema and placed on NIV BIPAP.     Discussed GOC with daughter Ms. Briscoe over phone [phone], who wants the pt to be FULL CODE.    1) Respiratory failure with hypercapnea:  ABG s/o uncompensated respiratory acidosis which could be secondary to pulmonary edema may have contributed to encephalopathy.  Currently pt is more awake, following verbal commands  CXR s/o mild pulmonary congestion  Pulmonary exam improving  Continue lasix 40 mg IV BID  Continue BIPAP support, will give her a break today  Strict I/Os  Start DASH diet    2) Pulmonary edema  Pt is tachypneic with CXR s/o mild pulmonary congestion  Echo with grade 3 DD, moderate AS, mod to severe TR  Management as above  Cardiology- Dr. Medina     3) DENEEN   PT has BUN/ creat of 77/2.03  could have underlying CKD, unknown baseline.   Hyperkalemic to 5.5 today, cocktail given  On phoslo for hyperphosphatemia  Monitor BMP  f/u [facility] renal  Nephrology- Dr Guerrero    4) HTN  Pt has Hx of HTN, will continue home dose of hydralazine, norvasc and lasix  monitor BP closely    5) Anemia  PT has Hb 7.0 on admission, which improved to 9.3 s/p 1 PRBC  stool occult negative  pt has hx of rectal bleed, but no episodes of melena or hematemesis since admission  macrocytic anemia but Vit b12 and folate levels are WNL  Could have some underlying MDS or MM,  f/u SPEP, UPEP  Monitor CBC  complete anemia profile was not sent on admission prior to PRBC transfusion.    6) need for prophylactic measure  DVT ppx with heparin SQ  GI ppx with protonix for GERD.    7) GOC discussion  Discussed GOC with daughter Ms. Briscoe over phone [phone], who wants the pt to be FULL CODE.

## 2019-03-15 NOTE — PROGRESS NOTE ADULT - SUBJECTIVE AND OBJECTIVE BOX
Patient is a 89y old  Female who presents with a chief complaint of shortness of breath (15 Mar 2019 11:31)    Patient  resting  on BIPAP, in NAD.     INTERVAL HPI/OVERNIGHT EVENTS:      VITAL SIGNS:  T(F): 97.8 (03-15-19 @ 08:00)  HR: 63 (03-15-19 @ 12:01)  BP: 130/42 (03-15-19 @ 10:00)  RR: 21 (03-15-19 @ 10:00)  SpO2: 97% (03-15-19 @ 12:01)  Wt(kg): --  I&O's Detail    14 Mar 2019 07:  -  15 Mar 2019 07:00  --------------------------------------------------------  IN:    Oral Fluid: 120 mL  Total IN: 120 mL    OUT:    Indwelling Catheter - Urethral: 1785 mL  Total OUT: 1785 mL    Total NET: -1665 mL      15 Mar 2019 07:01  -  15 Mar 2019 12:20  --------------------------------------------------------  IN:    Oral Fluid: 200 mL    Solution: 100 mL  Total IN: 300 mL    OUT:    Indwelling Catheter - Urethral: 150 mL  Total OUT: 150 mL    Total NET: 150 mL              REVIEW OF SYSTEMS:    CONSTITUTIONAL:  No fevers, chills, sweats    HEENT:  Eyes:  No diplopia or blurred vision. ENT:  No earache, sore throat or runny nose.    CARDIOVASCULAR:  No pressure, squeezing, tightness, or heaviness about the chest; no palpitations.    RESPIRATORY:  Per HPI    GASTROINTESTINAL:  No abdominal pain, nausea, vomiting or diarrhea.    GENITOURINARY:  No dysuria, frequency or urgency.    NEUROLOGIC:  No paresthesias, fasciculations, seizures or weakness.    PSYCHIATRIC:  No disorder of thought or mood.      PHYSICAL EXAM:    Constitutional: Well developed and nourished  Eyes:Perrla  ENMT: normal  Neck:supple  Respiratory: good air entry  Cardiovascular: S1 S2 regular  Gastrointestinal: Soft, Non tender  Extremities: No edema  Vascular:normal  Neurological:Awake, alert,Ox3  Musculoskeletal:Normal      MEDICATIONS  (STANDING):  amLODIPine   Tablet 10 milliGRAM(s) Oral daily  calcium acetate 667 milliGRAM(s) Oral four times a day with meals  chlorhexidine 4% Liquid 1 Application(s) Topical every 12 hours  ergocalciferol 74609 Unit(s) Oral <User Schedule>  furosemide   Injectable 40 milliGRAM(s) IV Push every 12 hours  heparin  Injectable 5000 Unit(s) SubCutaneous every 12 hours  hydrALAZINE 25 milliGRAM(s) Oral three times a day  pantoprazole    Tablet 40 milliGRAM(s) Oral before breakfast    MEDICATIONS  (PRN):  acetaminophen   Tablet .. 650 milliGRAM(s) Oral every 8 hours PRN Moderate Pain (4 - 6)  ALBUTerol    90 MICROgram(s) HFA Inhaler 2 Puff(s) Inhalation every 6 hours PRN Shortness of Breath and/or Wheezing      Allergies    iodine (Unknown)  meperidine (Rash)  oxycodone (Other; Nausea)    Intolerances        LABS:                        8.5    3.79  )-----------( 162      ( 15 Mar 2019 06:20 )             28.3     03-15    144  |  117<H>  |  79<H>  ----------------------------<  58<L>  5.5<H>   |  21<L>  |  2.03<H>    Ca    8.2<L>      15 Mar 2019 06:20  Phos  5.5     03-15  Mg     2.5     03-15    TPro  6.5  /  Alb  2.6<L>  /  TBili  0.2  /  DBili  x   /  AST  35  /  ALT  54  /  AlkPhos  82  03-15    PT/INR - ( 14 Mar 2019 13:57 )   PT: 11.6 sec;   INR: 1.04 ratio         PTT - ( 13 Mar 2019 13:16 )  PTT:38.1 sec  Urinalysis Basic - ( 14 Mar 2019 13:45 )    Color: Yellow / Appearance: Clear / S.015 / pH: x  Gluc: x / Ketone: Negative  / Bili: Negative / Urobili: Negative   Blood: x / Protein: 30 mg/dL / Nitrite: Negative   Leuk Esterase: Negative / RBC: 0-2 /HPF / WBC 0-2 /HPF   Sq Epi: x / Non Sq Epi: Occasional /HPF / Bacteria: Trace /HPF      ABG - ( 15 Mar 2019 04:40 )  pH, Arterial: 7.24  pH, Blood: x     /  pCO2: 54    /  pO2: 136   / HCO3: 22    / Base Excess: -4.5  /  SaO2: 99                CARDIAC MARKERS ( 14 Mar 2019 06:39 )  0.062 ng/mL / x     / 132 U/L / x     / 3.7 ng/mL  CARDIAC MARKERS ( 13 Mar 2019 13:16 )  0.063 ng/mL / x     / x     / x     / x          CAPILLARY BLOOD GLUCOSE      POCT Blood Glucose.: 76 mg/dL (14 Mar 2019 23:51)  POCT Blood Glucose.: 78 mg/dL (14 Mar 2019 18:37)  POCT Blood Glucose.: 87 mg/dL (14 Mar 2019 13:46)    pro-bnp [id number]  @ 13:16     d-dimer --   @ 13:16      RADIOLOGY & ADDITIONAL TESTS:      < from: US Duplex Venous Lower Ext Complete, Bilateral (19 @ 14:38) >  IMPRESSION:     No evidence of deep venous thrombosis.    < end of copied text >      CXR:  < from: Xray Chest 1 View AP/PA (19 @ 12:53) >  IMPRESSION:  Increased interstitial lung markings with nonspecific prominence right   hilum which could be related to pulmonary vasculature. Underlying mass or   lymphadenopathy not excluded.    Small bilateral pleural effusions with adjacent airspace opacities.    Heart size cannot be accurately assessed in this projection, but appear   enlarged.    < end of copied text >    Ct scan chest:    ekg;    echo: Patient is a 89y old  Female who presents with a chief complaint of shortness of breath (15 Mar 2019 11:31)    Patient is awake, alert, comfortable in bed in NAD. Currently on supp oxygen via VM.    INTERVAL HPI/OVERNIGHT EVENTS:      VITAL SIGNS:  T(F): 97.8 (03-15-19 @ 08:00)  HR: 63 (03-15-19 @ 12:01)  BP: 130/42 (03-15-19 @ 10:00)  RR: 21 (03-15-19 @ 10:00)  SpO2: 97% (03-15-19 @ 12:01)  Wt(kg): --  I&O's Detail    14 Mar 2019 07:  -  15 Mar 2019 07:00  --------------------------------------------------------  IN:    Oral Fluid: 120 mL  Total IN: 120 mL    OUT:    Indwelling Catheter - Urethral: 1785 mL  Total OUT: 1785 mL    Total NET: -1665 mL      15 Mar 2019 07:  -  15 Mar 2019 12:20  --------------------------------------------------------  IN:    Oral Fluid: 200 mL    Solution: 100 mL  Total IN: 300 mL    OUT:    Indwelling Catheter - Urethral: 150 mL  Total OUT: 150 mL    Total NET: 150 mL              REVIEW OF SYSTEMS:    CONSTITUTIONAL:  No fevers, chills, sweats    HEENT:  Eyes:  No diplopia or blurred vision. ENT:  No earache, sore throat or runny nose.    CARDIOVASCULAR:  No pressure, squeezing, tightness, or heaviness about the chest; no palpitations.    RESPIRATORY:  Per HPI    GASTROINTESTINAL:  No abdominal pain, nausea, vomiting or diarrhea.    GENITOURINARY:  No dysuria, frequency or urgency.    NEUROLOGIC:  No paresthesias, fasciculations, seizures or weakness.    PSYCHIATRIC:  No disorder of thought or mood.      PHYSICAL EXAM:    Constitutional: Well developed and nourished  Eyes:Perrla  ENMT: normal  Neck:supple  Respiratory: good air entry  Cardiovascular: S1 S2 regular  Gastrointestinal: Soft, Non tender  Extremities: No edema  Vascular:normal  Neurological:Awake, alert,Ox3  Musculoskeletal:Normal      MEDICATIONS  (STANDING):  amLODIPine   Tablet 10 milliGRAM(s) Oral daily  calcium acetate 667 milliGRAM(s) Oral four times a day with meals  chlorhexidine 4% Liquid 1 Application(s) Topical every 12 hours  ergocalciferol 07223 Unit(s) Oral <User Schedule>  furosemide   Injectable 40 milliGRAM(s) IV Push every 12 hours  heparin  Injectable 5000 Unit(s) SubCutaneous every 12 hours  hydrALAZINE 25 milliGRAM(s) Oral three times a day  pantoprazole    Tablet 40 milliGRAM(s) Oral before breakfast    MEDICATIONS  (PRN):  acetaminophen   Tablet .. 650 milliGRAM(s) Oral every 8 hours PRN Moderate Pain (4 - 6)  ALBUTerol    90 MICROgram(s) HFA Inhaler 2 Puff(s) Inhalation every 6 hours PRN Shortness of Breath and/or Wheezing      Allergies    iodine (Unknown)  meperidine (Rash)  oxycodone (Other; Nausea)    Intolerances        LABS:                        8.5    3.79  )-----------( 162      ( 15 Mar 2019 06:20 )             28.3     03-15    144  |  117<H>  |  79<H>  ----------------------------<  58<L>  5.5<H>   |  21<L>  |  2.03<H>    Ca    8.2<L>      15 Mar 2019 06:20  Phos  5.5     03-15  Mg     2.5     03-15    TPro  6.5  /  Alb  2.6<L>  /  TBili  0.2  /  DBili  x   /  AST  35  /  ALT  54  /  AlkPhos  82  03-15    PT/INR - ( 14 Mar 2019 13:57 )   PT: 11.6 sec;   INR: 1.04 ratio         PTT - ( 13 Mar 2019 13:16 )  PTT:38.1 sec  Urinalysis Basic - ( 14 Mar 2019 13:45 )    Color: Yellow / Appearance: Clear / S.015 / pH: x  Gluc: x / Ketone: Negative  / Bili: Negative / Urobili: Negative   Blood: x / Protein: 30 mg/dL / Nitrite: Negative   Leuk Esterase: Negative / RBC: 0-2 /HPF / WBC 0-2 /HPF   Sq Epi: x / Non Sq Epi: Occasional /HPF / Bacteria: Trace /HPF      ABG - ( 15 Mar 2019 04:40 )  pH, Arterial: 7.24  pH, Blood: x     /  pCO2: 54    /  pO2: 136   / HCO3: 22    / Base Excess: -4.5  /  SaO2: 99                CARDIAC MARKERS ( 14 Mar 2019 06:39 )  0.062 ng/mL / x     / 132 U/L / x     / 3.7 ng/mL  CARDIAC MARKERS ( 13 Mar 2019 13:16 )  0.063 ng/mL / x     / x     / x     / x          CAPILLARY BLOOD GLUCOSE      POCT Blood Glucose.: 76 mg/dL (14 Mar 2019 23:51)  POCT Blood Glucose.: 78 mg/dL (14 Mar 2019 18:37)  POCT Blood Glucose.: 87 mg/dL (14 Mar 2019 13:46)    pro-bnp 8592  @ 13:16     d-dimer --   @ 13:16      RADIOLOGY & ADDITIONAL TESTS:      < from: US Duplex Venous Lower Ext Complete, Bilateral (19 @ 14:38) >  IMPRESSION:     No evidence of deep venous thrombosis.    < end of copied text >      CXR:  < from: Xray Chest 1 View AP/PA (19 @ 12:53) >  IMPRESSION:  Increased interstitial lung markings with nonspecific prominence right   hilum which could be related to pulmonary vasculature. Underlying mass or   lymphadenopathy not excluded.    Small bilateral pleural effusions with adjacent airspace opacities.    Heart size cannot be accurately assessed in this projection, but appear   enlarged.    < end of copied text >    Ct scan chest:    ekg;    echo:

## 2019-03-15 NOTE — PROGRESS NOTE ADULT - ASSESSMENT
Patient admitted for shortness of breath, likely 2/2 CHF exacerbation given elevated proBNP and bilateral pleural effusions, patient also found to have symptomatic anemia, however guaiac negative, macrocytic, f/u B12 and folate levels in AM    Pt is FULL CODE    PRIMARY TEAM TO CALL PREVIOUS PCP DR. ALEMAN IN AM FOR FURTHER MEDICAL HISTORY INFORMATION AS WELL AS BASELINE CREATININE  Dr. Aleman [phone], [phone]      Problem 1: dyspnea  acute CHF exacerbation vs COPD, unknown if pt smoked in the past, however currently denying  s/p 40mg IV lasix, c/w 40mg IV lasix daily for now  c/w duonebs q6h  CXR shows bilateral pleural effusions, elevated proBNP on admission  continue to monitor BMP and fluid status  c/w telemetry monitoring for now  initial troponin 0.063  f/u T2, T3  f/u echocardiogram  f/u cardiology consult - Dr. Medina  f/u pulmonology consult - Dr. Dumont    Problem 2: symptomatic anemia  hemoglobin 7 on admission, guaiac negative, macrocytic on admission CBC, may indicate Vitamin B12 vs folate deficiency  s/p 1U PRBC transfusion (started prior to anemia panel collection)  f/u repeat CBC post transfusion at 12 AM  f/u CBC in AM  f/u Vit B12, folate in AM    Problem 3: HTN  c/w hydralazine TID, amlodipine, holding losartan given creatinine 1.77, unknown baseline  continue to monitor BP    Problem 4: CKD  DENEEN on CKD vs worsening CKD?  unknown baseline creatinine  will continue with IV diuresis as above  f/u urine lytes  f/u BMP in AM  PRIMARY TEAM TO ESTABLISH BASELINE CREATININE WITH PREVIOUS PCP Dr. Aleman as above    Problem 5: GERD  c/w protonix daily    Problem 6: Need for prophylactic measure  IMPROVE VTE Individual Risk Assessment          RISK                                                          Points  [  ] Previous VTE                                                3  [  ] Thrombophilia                                             2  [ x ] Lower limb paralysis                                   2        (unable to hold up >15 seconds)    [  ] Current Cancer                                             2         (within 6 months)  [ x ] Immobilization > 24 hrs                              1  [  ] ICU/CCU stay > 24 hours                             1  [ x ] Age > 60                                                         1    IMPROVE VTE Score: 4    c/w heparin for vte prophylaxis

## 2019-03-15 NOTE — PROGRESS NOTE ADULT - SUBJECTIVE AND OBJECTIVE BOX
INTERVAL HPI/OVERNIGHT EVENTS: Patient placed on BIPAP, however ABG remains acidotic. Echo with grade 3 DD.    PRESSORS: [ ] YES [ x NO  WHICH:    ANTIBIOTICS:                  DATE STARTED:  ANTIBIOTICS:                  DATE STARTED:  ANTIBIOTICS:                  DATE STARTED:    Antimicrobial:    Cardiovascular:  amLODIPine   Tablet 10 milliGRAM(s) Oral daily  furosemide   Injectable 40 milliGRAM(s) IV Push every 12 hours  hydrALAZINE 25 milliGRAM(s) Oral three times a day    Pulmonary:  ALBUTerol    90 MICROgram(s) HFA Inhaler 2 Puff(s) Inhalation every 6 hours PRN    Hematalogic:  heparin  Injectable 5000 Unit(s) SubCutaneous every 12 hours    Other:  acetaminophen   Tablet .. 650 milliGRAM(s) Oral every 8 hours PRN  calcium acetate 667 milliGRAM(s) Oral four times a day with meals  calcium gluconate IVPB 1 Gram(s) IV Intermittent once  chlorhexidine 4% Liquid 1 Application(s) Topical every 12 hours  dextrose 50% Injectable 50 milliLiter(s) IV Push once  ergocalciferol 60982 Unit(s) Oral <User Schedule>  insulin regular  human recombinant. 10 Unit(s) IV Push once  pantoprazole    Tablet 40 milliGRAM(s) Oral before breakfast  sodium polystyrene sulfonate Suspension 30 Gram(s) Oral once    acetaminophen   Tablet .. 650 milliGRAM(s) Oral every 8 hours PRN  ALBUTerol    90 MICROgram(s) HFA Inhaler 2 Puff(s) Inhalation every 6 hours PRN  amLODIPine   Tablet 10 milliGRAM(s) Oral daily  calcium acetate 667 milliGRAM(s) Oral four times a day with meals  calcium gluconate IVPB 1 Gram(s) IV Intermittent once  chlorhexidine 4% Liquid 1 Application(s) Topical every 12 hours  dextrose 50% Injectable 50 milliLiter(s) IV Push once  ergocalciferol 54851 Unit(s) Oral <User Schedule>  furosemide   Injectable 40 milliGRAM(s) IV Push every 12 hours  heparin  Injectable 5000 Unit(s) SubCutaneous every 12 hours  hydrALAZINE 25 milliGRAM(s) Oral three times a day  insulin regular  human recombinant. 10 Unit(s) IV Push once  pantoprazole    Tablet 40 milliGRAM(s) Oral before breakfast  sodium polystyrene sulfonate Suspension 30 Gram(s) Oral once    Drug Dosing Weight  Height (cm): 152.4 (13 Mar 2019 11:40)  Weight (kg): 58.5 (13 Mar 2019 11:40)  BMI (kg/m2): 25.2 (13 Mar 2019 11:40)  BSA (m2): 1.55 (13 Mar 2019 11:40)    CENTRAL LINE: [ ] YES [ x NO  LOCATION:         GUZMAN: [ x YES [ ] NO          A-LINE:  [ ] YES [x ]NO  LOCATION:             ICU Vital Signs Last 24 Hrs  T(C): 37.3 (15 Mar 2019 06:00), Max: 37.3 (15 Mar 2019 00:00)  T(F): 99.2 (15 Mar 2019 06:00), Max: 99.2 (15 Mar 2019 00:00)  HR: 77 (15 Mar 2019 07:00) (58 - 77)  BP: 156/59 (15 Mar 2019 07:00) (123/90 - 187/56)  BP(mean): 84 (15 Mar 2019 07:00) (59 - 125)  ABP: --  ABP(mean): --  RR: 20 (15 Mar 2019 07:00) (12 - 22)  SpO2: 97% (15 Mar 2019 07:00) (90% - 100%)      ABG - ( 15 Mar 2019 04:40 )  pH, Arterial: 7.24  pH, Blood: x     /  pCO2: 54    /  pO2: 136   / HCO3: 22    / Base Excess: -4.5  /  SaO2: 99                    03-14 @ 07:01  -  03-15 @ 07:00  --------------------------------------------------------  IN: 120 mL / OUT: 1735 mL / NET: -1615 mL            REVIEW OF SYSTEMS:  limited as patient on BiPAP    PHYSICAL EXAM:    GENERAL: patient on bpap  EYES: PERRLA< EOMI  NECK: Supple, No JVD; Normal thyroid; Trachea midline  NERVOUS SYSTEM:  Awake & alert; Motor Strength 5/5 B/L upper and lower extremities; DTRs 2+ intact and symmetric  CHEST/LUNG: No rales, rhonchi, wheezing   HEART: Regular rate and rhythm; No murmurs,   ABDOMEN: Soft, Nontender, Nondistended; Bowel sounds present  EXTREMITIES:  2+ Peripheral Pulses, No clubbing, cyanosis, or edema        LABS:  CBC Full  -  ( 15 Mar 2019 06:20 )  WBC Count : 3.79 K/uL  Hemoglobin : 8.5 g/dL  Hematocrit : 28.3 %  Platelet Count - Automated : 162 K/uL  Mean Cell Volume : 102.2 fl  Mean Cell Hemoglobin : 30.7 pg  Mean Cell Hemoglobin Concentration : 30.0 gm/dL  Auto Neutrophil # : 2.10 K/uL  Auto Lymphocyte # : 1.13 K/uL  Auto Monocyte # : 0.50 K/uL  Auto Eosinophil # : 0.03 K/uL  Auto Basophil # : 0.01 K/uL  Auto Neutrophil % : 55.4 %  Auto Lymphocyte % : 29.8 %  Auto Monocyte % : 13.2 %  Auto Eosinophil % : 0.8 %  Auto Basophil % : 0.3 %    03-15    144  |  117<H>  |  79<H>  ----------------------------<  58<L>  5.5<H>   |  21<L>  |  2.03<H>    Ca    8.2<L>      15 Mar 2019 06:20  Phos  5.5     03-15  Mg     2.5     03-15    TPro  6.5  /  Alb  2.6<L>  /  TBili  0.2  /  DBili  x   /  AST  35  /  ALT  54  /  AlkPhos  82  03-15    PT/INR - ( 14 Mar 2019 13:57 )   PT: 11.6 sec;   INR: 1.04 ratio         PTT - ( 13 Mar 2019 13:16 )  PTT:38.1 sec  Urinalysis Basic - ( 14 Mar 2019 13:45 )    Color: Yellow / Appearance: Clear / S.015 / pH: x  Gluc: x / Ketone: Negative  / Bili: Negative / Urobili: Negative   Blood: x / Protein: 30 mg/dL / Nitrite: Negative   Leuk Esterase: Negative / RBC: 0-2 /HPF / WBC 0-2 /HPF   Sq Epi: x / Non Sq Epi: Occasional /HPF / Bacteria: Trace /HPF          RADIOLOGY & ADDITIONAL STUDIES REVIEWED:     [ ]GOALS OF CARE DISCUSSION WITH PATIENT/FAMILY/PROXY:    CRITICAL CARE TIME SPENT: 35 minutes INTERVAL HPI/OVERNIGHT EVENTS: Patient placed on BIPAP, however ABG remains acidotic. Echo with grade 3 DD.    PRESSORS: [ ] YES [ x NO  WHICH:    ANTIBIOTICS:                  DATE STARTED:  ANTIBIOTICS:                  DATE STARTED:  ANTIBIOTICS:                  DATE STARTED:    Antimicrobial:    Cardiovascular:  amLODIPine   Tablet 10 milliGRAM(s) Oral daily  furosemide   Injectable 40 milliGRAM(s) IV Push every 12 hours  hydrALAZINE 25 milliGRAM(s) Oral three times a day    Pulmonary:  ALBUTerol    90 MICROgram(s) HFA Inhaler 2 Puff(s) Inhalation every 6 hours PRN    Hematalogic:  heparin  Injectable 5000 Unit(s) SubCutaneous every 12 hours    Other:  acetaminophen   Tablet .. 650 milliGRAM(s) Oral every 8 hours PRN  calcium acetate 667 milliGRAM(s) Oral four times a day with meals  calcium gluconate IVPB 1 Gram(s) IV Intermittent once  chlorhexidine 4% Liquid 1 Application(s) Topical every 12 hours  dextrose 50% Injectable 50 milliLiter(s) IV Push once  ergocalciferol 90404 Unit(s) Oral <User Schedule>  insulin regular  human recombinant. 10 Unit(s) IV Push once  pantoprazole    Tablet 40 milliGRAM(s) Oral before breakfast  sodium polystyrene sulfonate Suspension 30 Gram(s) Oral once    acetaminophen   Tablet .. 650 milliGRAM(s) Oral every 8 hours PRN  ALBUTerol    90 MICROgram(s) HFA Inhaler 2 Puff(s) Inhalation every 6 hours PRN  amLODIPine   Tablet 10 milliGRAM(s) Oral daily  calcium acetate 667 milliGRAM(s) Oral four times a day with meals  calcium gluconate IVPB 1 Gram(s) IV Intermittent once  chlorhexidine 4% Liquid 1 Application(s) Topical every 12 hours  dextrose 50% Injectable 50 milliLiter(s) IV Push once  ergocalciferol 76566 Unit(s) Oral <User Schedule>  furosemide   Injectable 40 milliGRAM(s) IV Push every 12 hours  heparin  Injectable 5000 Unit(s) SubCutaneous every 12 hours  hydrALAZINE 25 milliGRAM(s) Oral three times a day  insulin regular  human recombinant. 10 Unit(s) IV Push once  pantoprazole    Tablet 40 milliGRAM(s) Oral before breakfast  sodium polystyrene sulfonate Suspension 30 Gram(s) Oral once    Drug Dosing Weight  Height (cm): 152.4 (13 Mar 2019 11:40)  Weight (kg): 58.5 (13 Mar 2019 11:40)  BMI (kg/m2): 25.2 (13 Mar 2019 11:40)  BSA (m2): 1.55 (13 Mar 2019 11:40)    CENTRAL LINE: [ ] YES [ x NO  LOCATION:         GUZMAN: [ x YES [ ] NO          A-LINE:  [ ] YES [x ]NO  LOCATION:             ICU Vital Signs Last 24 Hrs  T(C): 37.3 (15 Mar 2019 06:00), Max: 37.3 (15 Mar 2019 00:00)  T(F): 99.2 (15 Mar 2019 06:00), Max: 99.2 (15 Mar 2019 00:00)  HR: 77 (15 Mar 2019 07:00) (58 - 77)  BP: 156/59 (15 Mar 2019 07:00) (123/90 - 187/56)  BP(mean): 84 (15 Mar 2019 07:00) (59 - 125)  ABP: --  ABP(mean): --  RR: 20 (15 Mar 2019 07:00) (12 - 22)  SpO2: 97% (15 Mar 2019 07:00) (90% - 100%)      ABG - ( 15 Mar 2019 04:40 )  pH, Arterial: 7.24  pH, Blood: x     /  pCO2: 54    /  pO2: 136   / HCO3: 22    / Base Excess: -4.5  /  SaO2: 99                    03-14 @ 07:01  -  03-15 @ 07:00  --------------------------------------------------------  IN: 120 mL / OUT: 1735 mL / NET: -1615 mL            REVIEW OF SYSTEMS:  limited as patient on BiPAP    PHYSICAL EXAM:    GENERAL: patient on bpap  EYES: PERRLA< EOMI  NECK: Supple, No JVD; Normal thyroid; Trachea midline  NERVOUS SYSTEM:  Awake & alert; Motor Strength 5/5 B/L upper and lower extremities; DTRs 2+ intact and symmetric  CHEST/LUNG: No rales, rhonchi, wheezing   HEART: Regular rate and rhythm; No murmurs,   ABDOMEN: Soft, Nontender, Nondistended; Bowel sounds present  EXTREMITIES:  2+ Peripheral Pulses, No clubbing, cyanosis, or edema        LABS:  CBC Full  -  ( 15 Mar 2019 06:20 )  WBC Count : 3.79 K/uL  Hemoglobin : 8.5 g/dL  Hematocrit : 28.3 %  Platelet Count - Automated : 162 K/uL  Mean Cell Volume : 102.2 fl  Mean Cell Hemoglobin : 30.7 pg  Mean Cell Hemoglobin Concentration : 30.0 gm/dL  Auto Neutrophil # : 2.10 K/uL  Auto Lymphocyte # : 1.13 K/uL  Auto Monocyte # : 0.50 K/uL  Auto Eosinophil # : 0.03 K/uL  Auto Basophil # : 0.01 K/uL  Auto Neutrophil % : 55.4 %  Auto Lymphocyte % : 29.8 %  Auto Monocyte % : 13.2 %  Auto Eosinophil % : 0.8 %  Auto Basophil % : 0.3 %    03-15    144  |  117<H>  |  79<H>  ----------------------------<  58<L>  5.5<H>   |  21<L>  |  2.03<H>    Ca    8.2<L>      15 Mar 2019 06:20  Phos  5.5     03-15  Mg     2.5     03-15    TPro  6.5  /  Alb  2.6<L>  /  TBili  0.2  /  DBili  x   /  AST  35  /  ALT  54  /  AlkPhos  82  03-15    PT/INR - ( 14 Mar 2019 13:57 )   PT: 11.6 sec;   INR: 1.04 ratio         PTT - ( 13 Mar 2019 13:16 )  PTT:38.1 sec  Urinalysis Basic - ( 14 Mar 2019 13:45 )    Color: Yellow / Appearance: Clear / S.015 / pH: x  Gluc: x / Ketone: Negative  / Bili: Negative / Urobili: Negative   Blood: x / Protein: 30 mg/dL / Nitrite: Negative   Leuk Esterase: Negative / RBC: 0-2 /HPF / WBC 0-2 /HPF   Sq Epi: x / Non Sq Epi: Occasional /HPF / Bacteria: Trace /HPF      [ ]GOALS OF CARE DISCUSSION WITH PATIENT/FAMILY/PROXY:    CRITICAL CARE TIME SPENT: 35 minutes

## 2019-03-15 NOTE — PROGRESS NOTE ADULT - ASSESSMENT
1. Hypercapnic Respiratory Failure  - Likely 2nd to pulmonary edema.     XR shows increased interstitial lung markings with nonspecific right hilum prominence - unable to r/o mass or lymphadenopathy on XR.   - Transfer to ICU   - Start on BIPAP  - Bronchodilators  - Steroids  - IV Lasix    - DVT PPX  -Follow up CXR     2. Renal Insufficiency - Chronic  - Monitor labs.  - BUN 77, Creat 2.03.   - Renal F/U  - Continue meds  - Avoid nephrotoxic drugs.     3. Hyperkalemia   - K+ 5.4   - Monitor CMP  - Renal F/U   - GI PPX 1. Hypercapnic Respiratory Failure  - Likely 2nd to pulmonary edema.     XR shows increased interstitial lung markings with nonspecific right hilum prominence - unable to r/o mass or lymphadenopathy on XR.   - Currently in ICU   - Off Bi-pap  Oxygen supp  - Bronchodilators  - Steroids  - IV Lasix    - DVT PPX  -Follow up CXR     2. Renal Insufficiency - Chronic  - Monitor labs.  - BUN 77, Creat 2.03.   - Renal F/U  - Continue meds  - Avoid nephrotoxic drugs.       3. Hyperkalemia   - K+ 5.4   - Monitor CMP  - Renal F/U   - GI PPX

## 2019-03-15 NOTE — PROGRESS NOTE ADULT - SUBJECTIVE AND OBJECTIVE BOX
CHIEF COMPLAINT:Patient is a 89y old  Female who presents with a chief complaint of shortness of breath (15 Mar 2019 12:17)    	  REVIEW OF SYSTEMS:  CONSTITUTIONAL: No fever, weight loss, or fatigue  EYES: No eye pain, visual disturbances, or discharge  ENMT:  No difficulty hearing, tinnitus, vertigo; No sinus or throat pain  NECK: No pain or stiffness  RESPIRATORY: No cough, wheezing, chills or hemoptysis; No Shortness of Breath  CARDIOVASCULAR: No chest pain, palpitations, passing out, dizziness, or leg swelling  GASTROINTESTINAL: No abdominal or epigastric pain. No nausea, vomiting, or hematemesis; No diarrhea or constipation. No melena or hematochezia.  GENITOURINARY: No dysuria, frequency, hematuria, or incontinence  NEUROLOGICAL: No headaches, memory loss, loss of strength, numbness, or tremors  SKIN: No itching, burning, rashes, or lesions   LYMPH Nodes: No enlarged glands  ENDOCRINE: No heat or cold intolerance; No hair loss  MUSCULOSKELETAL: No joint pain or swelling; No muscle, back, or extremity pain  PSYCHIATRIC: No depression, anxiety, mood swings, or difficulty sleeping  HEME/LYMPH: No easy bruising, or bleeding gums  ALLERY AND IMMUNOLOGIC: No hives or eczema	    [ ] All others negative	  [ ] Unable to obtain    PHYSICAL EXAM:  T(C): 36.8 (03-15-19 @ 16:00), Max: 37.3 (03-15-19 @ 00:00)  HR: 61 (03-15-19 @ 20:29) (61 - 77)  BP: 154/69 (03-15-19 @ 20:00) (130/42 - 166/43)  RR: 21 (03-15-19 @ 20:00) (12 - 25)  SpO2: 99% (03-15-19 @ 20:29) (95% - 100%)  Wt(kg): --  I&O's Summary    14 Mar 2019 07:01  -  15 Mar 2019 07:00  --------------------------------------------------------  IN: 120 mL / OUT: 1785 mL / NET: -1665 mL    15 Mar 2019 07:01  -  15 Mar 2019 23:56  --------------------------------------------------------  IN: 1200 mL / OUT: 1000 mL / NET: 200 mL        Appearance: lethargic, more alert than yesterday  HEENT:   PERRL, EOMI	  Lymphatic: No lymphadenopathy  Cardiovascular: Normal S1 S2, No JVD, No murmurs, No edema  Respiratory: Lungs clear to auscultation	  Psychiatry: A & O x 0-1  Gastrointestinal:  Soft, Non-tender, + BS	  Skin: No rashes, No ecchymoses, No cyanosis	  Neurologic: Non-focal  Extremities:  No clubbing, cyanosis or edema  Vascular: Peripheral pulses palpable 2+ bilaterally    MEDICATIONS  (STANDING):  amLODIPine   Tablet 10 milliGRAM(s) Oral daily  calcium acetate 667 milliGRAM(s) Oral four times a day with meals  chlorhexidine 4% Liquid 1 Application(s) Topical every 12 hours  ergocalciferol 53898 Unit(s) Oral <User Schedule>  furosemide   Injectable 40 milliGRAM(s) IV Push every 12 hours  heparin  Injectable 5000 Unit(s) SubCutaneous every 12 hours  hydrALAZINE 25 milliGRAM(s) Oral three times a day  pantoprazole    Tablet 40 milliGRAM(s) Oral before breakfast      TELEMETRY: 	    ECG:  	  RADIOLOGY:  OTHER: 	  	  CBC Full  -  ( 15 Mar 2019 06:20 )  WBC Count : 3.79 K/uL  Hemoglobin : 8.5 g/dL  Hematocrit : 28.3 %  Platelet Count - Automated : 162 K/uL  Mean Cell Volume : 102.2 fl  Mean Cell Hemoglobin : 30.7 pg  Mean Cell Hemoglobin Concentration : 30.0 gm/dL  Auto Neutrophil # : 2.10 K/uL  Auto Lymphocyte # : 1.13 K/uL  Auto Monocyte # : 0.50 K/uL  Auto Eosinophil # : 0.03 K/uL  Auto Basophil # : 0.01 K/uL  Auto Neutrophil % : 55.4 %  Auto Lymphocyte % : 29.8 %  Auto Monocyte % : 13.2 %  Auto Eosinophil % : 0.8 %  Auto Basophil % : 0.3 %        CARDIAC MARKERS:  CARDIAC MARKERS ( 14 Mar 2019 06:39 )  0.062 ng/mL / x     / 132 U/L / x     / 3.7 ng/mL                              8.5    3.79  )-----------( 162      ( 15 Mar 2019 06:20 )             28.3       03-15    143  |  111<H>  |  81<H>  ----------------------------<  116<H>  5.3   |  25  |  2.04<H>    Ca    8.4      15 Mar 2019 20:22  Phos  5.1     03-15  Mg     2.5     03-15    TPro  6.5  /  Alb  2.6<L>  /  TBili  0.2  /  DBili  x   /  AST  35  /  ALT  54  /  AlkPhos  82  03-15      PT/INR - ( 14 Mar 2019 13:57 )   PT: 11.6 sec;   INR: 1.04 ratio             Urinalysis Basic - ( 14 Mar 2019 13:45 )    Color: Yellow / Appearance: Clear / S.015 / pH: x  Gluc: x / Ketone: Negative  / Bili: Negative / Urobili: Negative   Blood: x / Protein: 30 mg/dL / Nitrite: Negative   Leuk Esterase: Negative / RBC: 0-2 /HPF / WBC 0-2 /HPF   Sq Epi: x / Non Sq Epi: Occasional /HPF / Bacteria: Trace /HPF      proBNP: Serum Pro-Brain Natriuretic Peptide: 8592 pg/mL ( @ 13:16)    Lipid Profile: Cholesterol 146  LDL 38    TG 25    HgA1c: Hemoglobin A1C, Whole Blood: 4.8 % ( @ 10:39)    TSH: Thyroid Stimulating Hormone, Serum: 3.74 uU/mL ( @ 06:39)    ABG - ( 15 Mar 2019 04:40 )  pH, Arterial: 7.24  pH, Blood: x     /  pCO2: 54    /  pO2: 136   / HCO3: 22    / Base Excess: -4.5  /  SaO2: 99

## 2019-03-15 NOTE — PROGRESS NOTE ADULT - ASSESSMENT
89 y/o F from [facility] w/ PMHx of GERD, anemia, colitis, rectal bleeding, chronic ankle swelling, presents to ED due to shortness of breath for 2 days.  1.ICU monitoring.  2.Psych f/u.  3.IV lasix.  4.CRI and hyperkalemia-Lasix,Renal eval.  5.DM-Insulin.  6.HTN-cont bp medication.  7.GI and DVT prophylaxis.

## 2019-03-15 NOTE — CONSULT NOTE ADULT - SUBJECTIVE AND OBJECTIVE BOX
Chief complain and HPI  89 y/o F from [facility] assisted living w/ PMHx of GERD, anemia, colitis, rectal bleeding, chronic ankle swelling, presents to ED due to shortness of breath for 2 days. Pt reports she is treated every 4 hours with albuterol nebulizers in the past 2 days, and that shortness of breath started 1 month ago once she moved to [facility]. Pt also complaints of bilateral leg swelling worsening over the past 1 month. Per daughter, patient has had hx of anemia, with colonoscopy 8 .  Admission hemoglobin wax low 7.0  Creatinine, Serum: 1.98 mg/dL (. @ 13:16)  Creatinine, Serum: 2.03 mg/dL (19 @ 06:39)  Creatinine, Serum: 2.03 mg/dL (03.15.19 @ 06:20)    Hemoglobin: 8.5 g/dL (03.15.19 @ 06:20)  Hemoglobin: 7.0: Test Repeated Result called. Results Verified      ECHO;  Moderate AR and AS.  Severe LAE and severe LVH.  Grade 3 diastolic dysfunction.  RV systolic pressure 49.  Moderate to severe TR.  Left Pleural effusion.          PAST MEDICAL & SURGICAL HISTORY:  Rectal bleeding  H/O gastroesophageal reflux (GERD)  Colitis  Anemia  CHF (congestive heart failure)  No significant past surgical history      Home Medications Reviewed    Hospital Medications:   MEDICATIONS  (STANDING):  amLODIPine   Tablet 10 milliGRAM(s) Oral daily  calcium acetate 667 milliGRAM(s) Oral four times a day with meals  chlorhexidine 4% Liquid 1 Application(s) Topical every 12 hours  ergocalciferol 10936 Unit(s) Oral <User Schedule>  furosemide   Injectable 40 milliGRAM(s) IV Push every 12 hours  heparin  Injectable 5000 Unit(s) SubCutaneous every 12 hours  hydrALAZINE 25 milliGRAM(s) Oral three times a day  pantoprazole    Tablet 40 milliGRAM(s) Oral before breakfast    MEDICATIONS  (PRN):  acetaminophen   Tablet .. 650 milliGRAM(s) Oral every 8 hours PRN Moderate Pain (4 - 6)  ALBUTerol    90 MICROgram(s) HFA Inhaler 2 Puff(s) Inhalation every 6 hours PRN Shortness of Breath and/or Wheezing      Allergies    iodine (Unknown)  meperidine (Rash)  oxycodone (Other; Nausea)    Intolerances                              8.5    3.79  )-----------( 162      ( 15 Mar 2019 06:20 )             28.3     03-15    144  |  117<H>  |  79<H>  ----------------------------<  58<L>  5.5<H>   |  21<L>  |  2.03<H>    Ca    8.2<L>      15 Mar 2019 06:20  Phos  5.5     -15  Mg     2.5     -15    TPro  6.5  /  Alb  2.6<L>  /  TBili  0.2  /  DBili  x   /  AST  35  /  ALT  54  /  AlkPhos  82  03-15    PT/INR - ( 14 Mar 2019 13:57 )   PT: 11.6 sec;   INR: 1.04 ratio         PTT - ( 13 Mar 2019 13:16 )  PTT:38.1 sec  Urinalysis Basic - ( 14 Mar 2019 13:45 )    Color: Yellow / Appearance: Clear / S.015 / pH: x  Gluc: x / Ketone: Negative  / Bili: Negative / Urobili: Negative   Blood: x / Protein: 30 mg/dL / Nitrite: Negative   Leuk Esterase: Negative / RBC: 0-2 /HPF / WBC 0-2 /HPF   Sq Epi: x / Non Sq Epi: Occasional /HPF / Bacteria: Trace /HPF      Osmolality, Random Urine: 350 mos/kg ( @ 13:45)  Chloride, Random Urine: 90 mmol/L ( @ 13:43)  Sodium, Random Urine: 77 mmol/L ( @ 13:43)  Creatinine, Random Urine: 46 mg/dL ( @ 13:43)    ABG - ( 15 Mar 2019 04:40 )  pH, Arterial: 7.24  pH, Blood: x     /  pCO2: 54    /  pO2: 136   / HCO3: 22    / Base Excess: -4.5  /  SaO2: 99                  RADIOLOGY & ADDITIONAL STUDIES:    SOCIAL HISTORY: Denies ETOh,Smoking,     FAMILY HISTORY:  No pertinent family history in first degree relatives      REVIEW OF SYSTEMS:  CONSTITUTIONAL: No malaise, No fatigue, No fevers or chills, well developed, no diaphoresis  EYES/ENT: No visual changes;  No vertigo or throat pain   NECK: No pain or stiffness  RESPIRATORY: No cough, wheezing, hemoptysis; No shortness of breath  CARDIOVASCULAR: No chest pain or palpitations. No edema  GASTROINTESTINAL: No abdominal or epigastric pain. No nausea, vomiting, or hematemesis; No diarrhea or constipation. No melena or hematochezia.  GENITOURINARY: No dysuria, frequency, foamy urine, urinary urgency, incontinence or hematuria  NEUROLOGICAL: No numbness or weakness, No tremor  SKIN: No itching, burning, rashes, or lesions   VASCULAR: No claudication  Musculoskeletal: no arthralgia, no myalgia  All other review of systems is negative unless indicated above.    VITALS:  Vital Signs Last 24 Hrs  T(C): 37.3 (15 Mar 2019 06:00), Max: 37.3 (15 Mar 2019 00:00)  T(F): 99.2 (15 Mar 2019 06:00), Max: 99.2 (15 Mar 2019 00:00)  HR: 77 (15 Mar 2019 07:00) (58 - 77)  BP: 156/59 (15 Mar 2019 07:00) (123/90 - 175/55)  BP(mean): 84 (15 Mar 2019 07:00) (59 - 125)  RR: 20 (15 Mar 2019 07:00) (12 - 22)  SpO2: 97% (15 Mar 2019 07:00) (97% - 100%)    03-14 @ 07:01  -  03-15 @ 07:00  --------------------------------------------------------  IN: 120 mL / OUT: 1735 mL / NET: -1615 mL          PHYSICAL EXAM:  Constitutional: NAD  HEENT: anicteric sclera, oropharynx clear, MMM  Neck: No JVD  Respiratory: good air entrance B/L, no wheezes, rales or rhonchi  Cardiovascular: S1, S2, RRR, no pericardial rub, no murmur  Gastrointestinal: BS+, soft, no tenderness, no distension, no bruit  Pelvis: bladder non-distended, no CVA tenderness  Extremities: No cyanosis or clubbing. No peripheral edema  Neurological: A/O x 3, no focal deficits  Psychiatric: Normal mood, normal affect  : No CVA tenderness. No villalobos.   Skin: No rashes  Vascular: all pulses present  Access: Chief complain and HPI  89 y/o F from [facility] assisted living w/ PMHx of GERD, anemia, colitis, rectal bleeding, chronic ankle swelling, presents to ED due to shortness of breath for 2 days. Pt reports she is treated every 4 hours with albuterol nebulizers in the past 2 days, and that shortness of breath started 1 month ago once she moved to [facility]. Pt also complaints of bilateral leg swelling worsening over the past 1 month. Per daughter, patient has had hx of anemia, with colonoscopy 8 .  Admission hemoglobin wax low 7.0  Creatinine, Serum: 1.98 mg/dL (. @ 13:16)  Creatinine, Serum: 2.03 mg/dL (19 @ 06:39)  Creatinine, Serum: 2.03 mg/dL (03.15.19 @ 06:20)    Hemoglobin: 8.5 g/dL (03.15.19 @ 06:20)  Hemoglobin: 7.0: Test Repeated Result called. Results Verified      ECHO;  Moderate AR and AS.  Severe LAE and severe LVH.  Grade 3 diastolic dysfunction.  RV systolic pressure 49.  Moderate to severe TR.  Left Pleural effusion.      PAST MEDICAL & SURGICAL HISTORY:  Rectal bleeding  H/O gastroesophageal reflux (GERD)  Colitis  Anemia  CHF (congestive heart failure)  No significant past surgical history      Home Medications Reviewed    Hospital Medications:   MEDICATIONS  (STANDING):  amLODIPine   Tablet 10 milliGRAM(s) Oral daily  calcium acetate 667 milliGRAM(s) Oral four times a day with meals  chlorhexidine 4% Liquid 1 Application(s) Topical every 12 hours  ergocalciferol 21038 Unit(s) Oral <User Schedule>  furosemide   Injectable 40 milliGRAM(s) IV Push every 12 hours  heparin  Injectable 5000 Unit(s) SubCutaneous every 12 hours  hydrALAZINE 25 milliGRAM(s) Oral three times a day  pantoprazole    Tablet 40 milliGRAM(s) Oral before breakfast    MEDICATIONS  (PRN):  acetaminophen   Tablet .. 650 milliGRAM(s) Oral every 8 hours PRN Moderate Pain (4 - 6)  ALBUTerol    90 MICROgram(s) HFA Inhaler 2 Puff(s) Inhalation every 6 hours PRN Shortness of Breath and/or Wheezing      Allergies    iodine (Unknown)  meperidine (Rash)  oxycodone (Other; Nausea)    Intolerances                              8.5    3.79  )-----------( 162      ( 15 Mar 2019 06:20 )             28.3     03-15    144  |  117<H>  |  79<H>  ----------------------------<  58<L>  5.5<H>   |  21<L>  |  2.03<H>    Ca    8.2<L>      15 Mar 2019 06:20  Phos  5.5     -15  Mg     2.5     -15    TPro  6.5  /  Alb  2.6<L>  /  TBili  0.2  /  DBili  x   /  AST  35  /  ALT  54  /  AlkPhos  82  03-15    PT/INR - ( 14 Mar 2019 13:57 )   PT: 11.6 sec;   INR: 1.04 ratio       PROCEDURE DATE:  03/15/2019          INTERPRETATION:  CLINICAL HISTORY: 89 years  Female with respiratory   failure.    COMPARISON: 3/14/2019.    The patient's chin obscures the superior mediastinum and left lung apex    There are bilateral pleural effusions layering posteriorly. Cannot rule   out underlying infiltrate or atelectasis.    Pulmonary vasculature is normal. The heart is enlarged. The aorta is   atherosclerotic. There isno mediastinal mass.    There is right perihilar fullness which may represent infiltrate. The   left hilum is unremarkable.    There is no visualized pneumothorax.    Moderate thoracic degenerative changes are present.    IMPRESSION: Bilateral pleural effusions layering posteriorly. Cannot rule   out underlying infiltrate or atelectasis.    Right perihilar fullness may be related to perihilar infiltrate or   rotation. Follow-up advised.        PTT - ( 13 Mar 2019 13:16 )  PTT:38.1 sec  Urinalysis Basic - ( 14 Mar 2019 13:45 )    Color: Yellow / Appearance: Clear / S.015 / pH: x  Gluc: x / Ketone: Negative  / Bili: Negative / Urobili: Negative   Blood: x / Protein: 30 mg/dL / Nitrite: Negative   Leuk Esterase: Negative / RBC: 0-2 /HPF / WBC 0-2 /HPF   Sq Epi: x / Non Sq Epi: Occasional /HPF / Bacteria: Trace /HPF      Osmolality, Random Urine: 350 mos/kg ( @ 13:45)  Chloride, Random Urine: 90 mmol/L ( @ 13:43)  Sodium, Random Urine: 77 mmol/L ( @ 13:43)  Creatinine, Random Urine: 46 mg/dL ( @ 13:43)    ABG - ( 15 Mar 2019 04:40 )  pH, Arterial: 7.24  pH, Blood: x     /  pCO2: 54    /  pO2: 136   / HCO3: 22    / Base Excess: -4.5  /  SaO2: 99                  RADIOLOGY & ADDITIONAL STUDIES:    SOCIAL HISTORY: Denies ETOh,Smoking,     FAMILY HISTORY:  No pertinent family history in first degree relatives      REVIEW OF SYSTEMS:  CONSTITUTIONAL: No malaise, No fatigue, No fevers or chills, well developed, no diaphoresis  EYES/ENT: No visual changes;  No vertigo or throat pain   NECK: No pain or stiffness  RESPIRATORY: No cough, wheezing, hemoptysis; No shortness of breath  CARDIOVASCULAR: No chest pain or palpitations. No edema  GASTROINTESTINAL: No abdominal or epigastric pain. No nausea, vomiting, or hematemesis; No diarrhea or constipation. No melena or hematochezia.  GENITOURINARY: No dysuria, frequency, foamy urine, urinary urgency, incontinence or hematuria  NEUROLOGICAL: No numbness or weakness, No tremor  SKIN: No itching, burning, rashes, or lesions   VASCULAR: No claudication  Musculoskeletal: no arthralgia, no myalgia  All other review of systems is negative unless indicated above.    VITALS:  Vital Signs Last 24 Hrs  T(C): 37.3 (15 Mar 2019 06:00), Max: 37.3 (15 Mar 2019 00:00)  T(F): 99.2 (15 Mar 2019 06:00), Max: 99.2 (15 Mar 2019 00:00)  HR: 77 (15 Mar 2019 07:00) (58 - 77)  BP: 156/59 (15 Mar 2019 07:00) (123/90 - 175/55)  BP(mean): 84 (15 Mar 2019 07:00) (59 - 125)  RR: 20 (15 Mar 2019 07:00) (12 - 22)  SpO2: 97% (15 Mar 2019 07:00) (97% - 100%)     @ 07:01  -  15 @ 07:00  --------------------------------------------------------  IN: 120 mL / OUT: 1735 mL / NET: -1615 mL          PHYSICAL EXAM:  Constitutional: NAD  HEENT: anicteric sclera, oropharynx clear, MMM  Neck: No JVD  Respiratory: expiratory wheezes bilateral   Cardiovascular: S1, S2, RRR SYSTOLIC M AT THE BASE /6  Gastrointestinal: BS+, soft, no tenderness, no distension, no bruit  Pelvis: bladder non-distended, no CVA tenderness  Extremities: No cyanosis or clubbing. No peripheral edema  Neurological: A/O x 3, no focal deficits  Psychiatric: Normal mood, normal affect  : No CVA tenderness. No villalobos.   Skin: No rashes

## 2019-03-15 NOTE — PROGRESS NOTE ADULT - SUBJECTIVE AND OBJECTIVE BOX
CHIEF COMPLAINT:Patient is a 89y old  Female who presents with a chief complaint of shortness of breath .Pt in ICU, off Bipap.    	  REVIEW OF SYSTEMS:  [x ] Unable to obtain    PHYSICAL EXAM:  T(C): 36.6 (03-15-19 @ 08:00), Max: 37.3 (03-15-19 @ 00:00)  HR: 67 (03-15-19 @ 10:00) (58 - 77)  BP: 130/42 (03-15-19 @ 10:00) (123/90 - 174/70)  RR: 21 (03-15-19 @ 10:00) (12 - 21)  SpO2: 97% (03-15-19 @ 10:00) (97% - 100%)  Wt(kg): --  I&O's Summary    14 Mar 2019 07:01  -  15 Mar 2019 07:00  --------------------------------------------------------  IN: 120 mL / OUT: 1785 mL / NET: -1665 mL    15 Mar 2019 07:01  -  15 Mar 2019 11:31  --------------------------------------------------------  IN: 300 mL / OUT: 150 mL / NET: 150 mL        Appearance: Normal	  HEENT:   Normal oral mucosa, PERRL, EOMI	  Lymphatic: No lymphadenopathy  Cardiovascular: Normal S1 S2, No JVD, No murmurs, No edema  Respiratory: Dec BS at bases	  Gastrointestinal:  Soft, Non-tender, + BS	  Skin: No rashes, No ecchymoses, No cyanosis	  Extremities: Normal range of motion, No clubbing, cyanosis or edema  Vascular: Peripheral pulses palpable 2+ bilaterally    MEDICATIONS  (STANDING):  amLODIPine   Tablet 10 milliGRAM(s) Oral daily  calcium acetate 667 milliGRAM(s) Oral four times a day with meals  chlorhexidine 4% Liquid 1 Application(s) Topical every 12 hours  ergocalciferol 81208 Unit(s) Oral <User Schedule>  furosemide   Injectable 40 milliGRAM(s) IV Push every 12 hours  heparin  Injectable 5000 Unit(s) SubCutaneous every 12 hours  hydrALAZINE 25 milliGRAM(s) Oral three times a day  pantoprazole    Tablet 40 milliGRAM(s) Oral before breakfast        	  LABS:	 	    CARDIAC MARKERS:  CARDIAC MARKERS ( 14 Mar 2019 06:39 )  0.062 ng/mL / x     / 132 U/L / x     / 3.7 ng/mL  CARDIAC MARKERS ( 13 Mar 2019 13:16 )  0.063 ng/mL / x     / x     / x     / x                             8.5    3.79  )-----------( 162      ( 15 Mar 2019 06:20 )             28.3     03-15    144  |  117<H>  |  79<H>  ----------------------------<  58<L>  5.5<H>   |  21<L>  |  2.03<H>    Ca    8.2<L>      15 Mar 2019 06:20  Phos  5.5     03-15  Mg     2.5     03-15    TPro  6.5  /  Alb  2.6<L>  /  TBili  0.2  /  DBili  x   /  AST  35  /  ALT  54  /  AlkPhos  82  03-15    proBNP: Serum Pro-Brain Natriuretic Peptide: 8592 pg/mL (03-13 @ 13:16)    Lipid Profile: Cholesterol 146  LDL 38    TG 25    HgA1c: Hemoglobin A1C, Whole Blood: 4.8 % (03-14 @ 10:39)    TSH: Thyroid Stimulating Hormone, Serum: 3.74 uU/mL (03-14 @ 06:39)

## 2019-03-15 NOTE — CONSULT NOTE ADULT - ASSESSMENT
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    Hyperkalemia due to renal failure.  Anemia work up in progress.    COPD.    Fluid status improved , increased serum sodium , side effect of Diuret    Follow urine K .  Adjust diuretics as need, fluid restriction    Increased in PO4 due to renal failure, avoid hifh dose of vitamin D at this time.  Follow po4  Follow PTH.    Urine for microalbumin and protein.

## 2019-03-16 DIAGNOSIS — J96.92 RESPIRATORY FAILURE, UNSPECIFIED WITH HYPERCAPNIA: ICD-10-CM

## 2019-03-16 DIAGNOSIS — E87.5 HYPERKALEMIA: ICD-10-CM

## 2019-03-16 DIAGNOSIS — N28.9 DISORDER OF KIDNEY AND URETER, UNSPECIFIED: ICD-10-CM

## 2019-03-16 LAB
ALBUMIN SERPL ELPH-MCNC: 2.6 G/DL — LOW (ref 3.5–5)
ALP SERPL-CCNC: 77 U/L — SIGNIFICANT CHANGE UP (ref 40–120)
ALT FLD-CCNC: 44 U/L DA — SIGNIFICANT CHANGE UP (ref 10–60)
ANION GAP SERPL CALC-SCNC: 3 MMOL/L — LOW (ref 5–17)
ANION GAP SERPL CALC-SCNC: 9 MMOL/L — SIGNIFICANT CHANGE UP (ref 5–17)
AST SERPL-CCNC: 23 U/L — SIGNIFICANT CHANGE UP (ref 10–40)
BASE EXCESS BLDA CALC-SCNC: -1.6 MMOL/L — SIGNIFICANT CHANGE UP (ref -2–2)
BASE EXCESS BLDA CALC-SCNC: -2.3 MMOL/L — LOW (ref -2–2)
BASOPHILS # BLD AUTO: 0 K/UL — SIGNIFICANT CHANGE UP (ref 0–0.2)
BASOPHILS NFR BLD AUTO: 0 % — SIGNIFICANT CHANGE UP (ref 0–2)
BILIRUB SERPL-MCNC: 0.2 MG/DL — SIGNIFICANT CHANGE UP (ref 0.2–1.2)
BLOOD GAS COMMENTS ARTERIAL: SIGNIFICANT CHANGE UP
BUN SERPL-MCNC: 80 MG/DL — HIGH (ref 7–18)
BUN SERPL-MCNC: 85 MG/DL — HIGH (ref 7–18)
CALCIUM SERPL-MCNC: 8.2 MG/DL — LOW (ref 8.4–10.5)
CALCIUM SERPL-MCNC: 8.3 MG/DL — LOW (ref 8.4–10.5)
CALCIUM SERPL-MCNC: 8.9 MG/DL — SIGNIFICANT CHANGE UP (ref 8.4–10.5)
CHLORIDE SERPL-SCNC: 111 MMOL/L — HIGH (ref 96–108)
CHLORIDE SERPL-SCNC: 112 MMOL/L — HIGH (ref 96–108)
CO2 SERPL-SCNC: 23 MMOL/L — SIGNIFICANT CHANGE UP (ref 22–31)
CO2 SERPL-SCNC: 29 MMOL/L — SIGNIFICANT CHANGE UP (ref 22–31)
CREAT SERPL-MCNC: 2.08 MG/DL — HIGH (ref 0.5–1.3)
CREAT SERPL-MCNC: 2.12 MG/DL — HIGH (ref 0.5–1.3)
EOSINOPHIL # BLD AUTO: 0 K/UL — SIGNIFICANT CHANGE UP (ref 0–0.5)
EOSINOPHIL NFR BLD AUTO: 0 % — SIGNIFICANT CHANGE UP (ref 0–6)
GLUCOSE SERPL-MCNC: 102 MG/DL — HIGH (ref 70–99)
GLUCOSE SERPL-MCNC: 107 MG/DL — HIGH (ref 70–99)
HCO3 BLDA-SCNC: 24 MMOL/L — SIGNIFICANT CHANGE UP (ref 23–27)
HCO3 BLDA-SCNC: 25 MMOL/L — SIGNIFICANT CHANGE UP (ref 23–27)
HCT VFR BLD CALC: 29.3 % — LOW (ref 34.5–45)
HGB BLD-MCNC: 8.7 G/DL — LOW (ref 11.5–15.5)
HOROWITZ INDEX BLDA+IHG-RTO: 32 — SIGNIFICANT CHANGE UP
HOROWITZ INDEX BLDA+IHG-RTO: 32 — SIGNIFICANT CHANGE UP
LYMPHOCYTES # BLD AUTO: 0.79 K/UL — LOW (ref 1–3.3)
LYMPHOCYTES # BLD AUTO: 27 % — SIGNIFICANT CHANGE UP (ref 13–44)
MAGNESIUM SERPL-MCNC: 2.3 MG/DL — SIGNIFICANT CHANGE UP (ref 1.6–2.6)
MAGNESIUM SERPL-MCNC: 2.4 MG/DL — SIGNIFICANT CHANGE UP (ref 1.6–2.6)
MCHC RBC-ENTMCNC: 29.7 GM/DL — LOW (ref 32–36)
MCHC RBC-ENTMCNC: 29.8 PG — SIGNIFICANT CHANGE UP (ref 27–34)
MCV RBC AUTO: 100.3 FL — HIGH (ref 80–100)
MONOCYTES # BLD AUTO: 0.17 K/UL — SIGNIFICANT CHANGE UP (ref 0–0.9)
MONOCYTES NFR BLD AUTO: 6 % — SIGNIFICANT CHANGE UP (ref 2–14)
NEUTROPHILS # BLD AUTO: 1.92 K/UL — SIGNIFICANT CHANGE UP (ref 1.8–7.4)
NEUTROPHILS NFR BLD AUTO: 66 % — SIGNIFICANT CHANGE UP (ref 43–77)
PCO2 BLDA: 53 MMHG — HIGH (ref 32–46)
PCO2 BLDA: 56 MMHG — HIGH (ref 32–46)
PH BLDA: 7.28 — LOW (ref 7.35–7.45)
PH BLDA: 7.28 — LOW (ref 7.35–7.45)
PHOSPHATE SERPL-MCNC: 5.3 MG/DL — HIGH (ref 2.5–4.5)
PHOSPHATE SERPL-MCNC: 5.6 MG/DL — HIGH (ref 2.5–4.5)
PLATELET # BLD AUTO: 159 K/UL — SIGNIFICANT CHANGE UP (ref 150–400)
PO2 BLDA: 103 MMHG — SIGNIFICANT CHANGE UP (ref 74–108)
PO2 BLDA: 118 MMHG — HIGH (ref 74–108)
POTASSIUM SERPL-MCNC: 5.1 MMOL/L — SIGNIFICANT CHANGE UP (ref 3.5–5.3)
POTASSIUM SERPL-MCNC: 5.2 MMOL/L — SIGNIFICANT CHANGE UP (ref 3.5–5.3)
POTASSIUM SERPL-SCNC: 5.1 MMOL/L — SIGNIFICANT CHANGE UP (ref 3.5–5.3)
POTASSIUM SERPL-SCNC: 5.2 MMOL/L — SIGNIFICANT CHANGE UP (ref 3.5–5.3)
PROT SERPL-MCNC: 6.6 G/DL — SIGNIFICANT CHANGE UP (ref 6–8.3)
PTH-INTACT FLD-MCNC: 101 PG/ML — HIGH (ref 15–65)
RBC # BLD: 2.92 M/UL — LOW (ref 3.8–5.2)
RBC # FLD: 16.6 % — HIGH (ref 10.3–14.5)
SAO2 % BLDA: 98 % — HIGH (ref 92–96)
SAO2 % BLDA: 98 % — HIGH (ref 92–96)
SODIUM SERPL-SCNC: 143 MMOL/L — SIGNIFICANT CHANGE UP (ref 135–145)
SODIUM SERPL-SCNC: 144 MMOL/L — SIGNIFICANT CHANGE UP (ref 135–145)
WBC # BLD: 2.91 K/UL — LOW (ref 3.8–10.5)
WBC # FLD AUTO: 2.91 K/UL — LOW (ref 3.8–10.5)

## 2019-03-16 PROCEDURE — 71045 X-RAY EXAM CHEST 1 VIEW: CPT | Mod: 26

## 2019-03-16 RX ADMIN — Medication 667 MILLIGRAM(S): at 19:00

## 2019-03-16 RX ADMIN — Medication 25 MILLIGRAM(S): at 22:50

## 2019-03-16 RX ADMIN — Medication 667 MILLIGRAM(S): at 13:20

## 2019-03-16 RX ADMIN — CHLORHEXIDINE GLUCONATE 1 APPLICATION(S): 213 SOLUTION TOPICAL at 06:06

## 2019-03-16 RX ADMIN — Medication 25 MILLIGRAM(S): at 13:21

## 2019-03-16 RX ADMIN — Medication 40 MILLIGRAM(S): at 07:10

## 2019-03-16 RX ADMIN — CHLORHEXIDINE GLUCONATE 1 APPLICATION(S): 213 SOLUTION TOPICAL at 19:00

## 2019-03-16 RX ADMIN — Medication 40 MILLIGRAM(S): at 19:00

## 2019-03-16 RX ADMIN — HEPARIN SODIUM 5000 UNIT(S): 5000 INJECTION INTRAVENOUS; SUBCUTANEOUS at 19:00

## 2019-03-16 RX ADMIN — Medication 667 MILLIGRAM(S): at 22:50

## 2019-03-16 NOTE — PHYSICAL THERAPY INITIAL EVALUATION ADULT - CRITERIA FOR SKILLED THERAPEUTIC INTERVENTIONS
anticipated discharge recommendation/rehab potential/anticipated equipment needs at discharge/functional limitations in following categories/impairments found/risk reduction/prevention/therapy frequency/predicted duration of therapy intervention

## 2019-03-16 NOTE — PROGRESS NOTE ADULT - SUBJECTIVE AND OBJECTIVE BOX
INTERVAL HPI/OVERNIGHT EVENTS:       Antimicrobial:    Cardiovascular:  amLODIPine   Tablet 10 milliGRAM(s) Oral daily  furosemide   Injectable 40 milliGRAM(s) IV Push every 12 hours  hydrALAZINE 25 milliGRAM(s) Oral three times a day    Pulmonary:  ALBUTerol    90 MICROgram(s) HFA Inhaler 2 Puff(s) Inhalation every 6 hours PRN    Hematalogic:  heparin  Injectable 5000 Unit(s) SubCutaneous every 12 hours    Other:  acetaminophen   Tablet .. 650 milliGRAM(s) Oral every 8 hours PRN  calcium acetate 667 milliGRAM(s) Oral four times a day with meals  chlorhexidine 4% Liquid 1 Application(s) Topical every 12 hours  ergocalciferol 36651 Unit(s) Oral <User Schedule>  pantoprazole    Tablet 40 milliGRAM(s) Oral before breakfast      Drug Dosing Weight  Height (cm): 152.4 (13 Mar 2019 11:40)  Weight (kg): 58.5 (13 Mar 2019 11:40)  BMI (kg/m2): 25.2 (13 Mar 2019 11:40)  BSA (m2): 1.55 (13 Mar 2019 11:40)    CENTRAL LINE: [ ] YES [ x] NO  LOCATION:   DATE INSERTED:    GUZMAN: [x ] YES [ ] NO    DATE INSERTED:    A-LINE:  [ ] YES [x ] NO  LOCATION:   DATE INSERTED:    PMH/Social Hx/Fam Hx -reviewed admission note, no change since admission  PAST MEDICAL & SURGICAL HISTORY:  Rectal bleeding  H/O gastroesophageal reflux (GERD)  Colitis  Anemia  CHF (congestive heart failure)  No significant past surgical history      T(C): 35.9 (19 @ 06:09), Max: 37.1 (03-15-19 @ 21:00)  HR: 65 (19 @ 11:35)  BP: 150/45 (19 @ 11:35)  BP(mean): 73 (19 @ 11:35)  ABP: --  ABP(mean): --  RR: 14 (19 @ 11:35)  SpO2: 98% (19 @ 11:35)  Wt(kg): --    ABG - ( 16 Mar 2019 08:47 )  pH, Arterial: 7.28  pH, Blood: x     /  pCO2: 53    /  pO2: 118   / HCO3: 24    / Base Excess: -2.3  /  SaO2: 98                    15 @ 07:01  -  16 @ 07:00  --------------------------------------------------------  IN: 1320 mL / OUT: 1870 mL / NET: -550 mL            PHYSICAL EXAM:    GENERAL: No signs of distress, comfortable  HEAD: Atraumatic, Normocephalic  EYES: EOMI, PERRLA  ENMT: No erythema, exudates, or enlargement, Moist mucous membranes  NECK: Supple, normal appearance, No JVD; [  ] central line (if applicable)  CHEST/LUNG: No chest deformity, fair bilateral air entry; No rales, rhonchi, wheezing; crackles  HEART: Regular rate and rhythm; No murmurs, rubs, or gallops;   ABDOMEN: Soft, Nontender, Nondistended; Bowel sounds present  EXTREMITIES:  + Peripheral Pulses, No clubbing, cyanosis, or edema  NERVOUS SYSTEM: awake and respond to simple questions  LYMPH: No lymphadenopathy noted  SKIN: No rashes or lesions; good turgor, warm, dry      LABS:  CBC Full  -  ( 16 Mar 2019 05:53 )  WBC Count : 2.91 K/uL  Hemoglobin : 8.7 g/dL  Hematocrit : 29.3 %  Platelet Count - Automated : 159 K/uL  Mean Cell Volume : 100.3 fl  Mean Cell Hemoglobin : 29.8 pg  Mean Cell Hemoglobin Concentration : 29.7 gm/dL  Auto Neutrophil # : 1.92 K/uL  Auto Lymphocyte # : 0.79 K/uL  Auto Monocyte # : 0.17 K/uL  Auto Eosinophil # : 0.00 K/uL  Auto Basophil # : 0.00 K/uL  Auto Neutrophil % : 66.0 %  Auto Lymphocyte % : 27.0 %  Auto Monocyte % : 6.0 %  Auto Eosinophil % : 0.0 %  Auto Basophil % : 0.0 %    -    144  |  112<H>  |  80<H>  ----------------------------<  107<H>  5.2   |  23  |  2.08<H>    Ca    8.3<L>      16 Mar 2019 05:53  Phos  5.3     03-16  Mg     2.4         TPro  6.6  /  Alb  2.6<L>  /  TBili  0.2  /  DBili  x   /  AST  23  /  ALT  44  /  AlkPhos  77      PT/INR - ( 14 Mar 2019 13:57 )   PT: 11.6 sec;   INR: 1.04 ratio           Urinalysis Basic - ( 14 Mar 2019 13:45 )    Color: Yellow / Appearance: Clear / S.015 / pH: x  Gluc: x / Ketone: Negative  / Bili: Negative / Urobili: Negative   Blood: x / Protein: 30 mg/dL / Nitrite: Negative   Leuk Esterase: Negative / RBC: 0-2 /HPF / WBC 0-2 /HPF   Sq Epi: x / Non Sq Epi: Occasional /HPF / Bacteria: Trace /HPF          RADIOLOGY & ADDITIONAL STUDIES REVIEWED     CXR:< from: Xray Chest 1 View- PORTABLE-Routine (19 @ 11:12) >    EXAM:  XR CHEST PORTABLE ROUTINE 1V                            PROCEDURE DATE:  2019          INTERPRETATION:  History: Fluid overload    Portable chest radiograph is compared to 3/13/2019.    The heart is enlarged. There is pulmonary vascular congestion with   bilateral pleural effusions. The appearance is similar to the present   day. There is osteopenia and degenerative change of the bony structures.    Impression: Cardiomegaly with pulmonary vascular congestion and bilateral   pleuraleffusions                ASAF DOMINIQUE M.D.,ATTENDING RADIOLOGIST  This document has been electronically signed. Mar 16 2019 11:31AM                < end of copied text >      IMPRESSION:  PAST MEDICAL & SURGICAL HISTORY:  Rectal bleeding  H/O gastroesophageal reflux (GERD)  Colitis  Anemia  CHF (congestive heart failure)  No significant past surgical history   p/w        IMP: This is a 89 y/o F from [facility] assisted living w/ PMHx of GERD, HTN, anemia, colitis, rectal bleeding, chronic ankle swelling, presents to ED due to shortness of breath for 2 days admitted for management of hypercapnic hypoxic resp failure due to pulmonary edema and placed on NIPPV support. PT off bipap today. CXR still has pul edema. Pul, cards and nephro f/u noted.  Blood gas still show resp acidosis          Plan:      CNS: no sedation    PULMONARY: O2 as needed and resume Bipap, increase [id number]    CARDIAC: monitor    GI: feed    RENAL: keep  neg fluid balance    SKIN: as per nursing    MUSCULOSKELETAL: Pt. OOB to chair    ID: no issue    HEME: monitor    DVT and GI Prophylaxis    GOALS OF CARE DISCUSSION WITH PATIENT/FAMILY/PROXY/ icu team on rounds

## 2019-03-16 NOTE — PROGRESS NOTE ADULT - SUBJECTIVE AND OBJECTIVE BOX
CHIEF COMPLAINT:Patient is a 89y old  Female who presents with a chief complaint of shortness of breath (16 Mar 2019 14:53)    	  REVIEW OF SYSTEMS:  CONSTITUTIONAL: No fever, weight loss, or fatigue  EYES: No eye pain, visual disturbances, or discharge  ENMT:  No difficulty hearing, tinnitus, vertigo; No sinus or throat pain  NECK: No pain or stiffness  RESPIRATORY: No cough, wheezing, chills or hemoptysis; No Shortness of Breath  CARDIOVASCULAR: No chest pain, palpitations, passing out, dizziness, or leg swelling  GASTROINTESTINAL: No abdominal or epigastric pain. No nausea, vomiting, or hematemesis; No diarrhea or constipation. No melena or hematochezia.  GENITOURINARY: No dysuria, frequency, hematuria, or incontinence  NEUROLOGICAL: No headaches, memory loss, loss of strength, numbness, or tremors  SKIN: No itching, burning, rashes, or lesions   LYMPH Nodes: No enlarged glands  ENDOCRINE: No heat or cold intolerance; No hair loss  MUSCULOSKELETAL: No joint pain or swelling; No muscle, back, or extremity pain  PSYCHIATRIC: No depression, anxiety, mood swings, or difficulty sleeping  HEME/LYMPH: No easy bruising, or bleeding gums  ALLERY AND IMMUNOLOGIC: No hives or eczema	    [ ] All others negative	  [ ] Unable to obtain    PHYSICAL EXAM:  T(C): 36.7 (03-16-19 @ 16:41), Max: 37.1 (03-15-19 @ 21:00)  HR: 62 (03-16-19 @ 20:08) (56 - 75)  BP: 152/54 (03-16-19 @ 17:00) (125/90 - 164/51)  RR: 15 (03-16-19 @ 17:00) (11 - 22)  SpO2: 100% (03-16-19 @ 20:08) (95% - 100%)  Wt(kg): --  I&O's Summary    15 Mar 2019 07:01  -  16 Mar 2019 07:00  --------------------------------------------------------  IN: 1320 mL / OUT: 1870 mL / NET: -550 mL    16 Mar 2019 07:01  -  16 Mar 2019 20:19  --------------------------------------------------------  IN: 800 mL / OUT: 680 mL / NET: 120 mL        Appearance: Normal	  HEENT:   Normal oral mucosa, PERRL, EOMI	  Lymphatic: No lymphadenopathy  Cardiovascular: Normal S1 S2, No JVD, No murmurs, No edema  Respiratory: Lungs clear to auscultation	  Psychiatry: A & O x 3, Mood & affect appropriate  Gastrointestinal:  Soft, Non-tender, + BS	  Skin: No rashes, No ecchymoses, No cyanosis	  Neurologic: Non-focal  Extremities: Normal range of motion, No clubbing, cyanosis or edema  Vascular: Peripheral pulses palpable 2+ bilaterally    MEDICATIONS  (STANDING):  amLODIPine   Tablet 10 milliGRAM(s) Oral daily  calcium acetate 667 milliGRAM(s) Oral four times a day with meals  chlorhexidine 4% Liquid 1 Application(s) Topical every 12 hours  ergocalciferol 57657 Unit(s) Oral <User Schedule>  furosemide   Injectable 40 milliGRAM(s) IV Push every 12 hours  heparin  Injectable 5000 Unit(s) SubCutaneous every 12 hours  hydrALAZINE 25 milliGRAM(s) Oral three times a day  pantoprazole    Tablet 40 milliGRAM(s) Oral before breakfast      TELEMETRY: 	    ECG:  	  RADIOLOGY:  OTHER: 	  	  CBC Full  -  ( 16 Mar 2019 05:53 )  WBC Count : 2.91 K/uL  Hemoglobin : 8.7 g/dL  Hematocrit : 29.3 %  Platelet Count - Automated : 159 K/uL  Mean Cell Volume : 100.3 fl  Mean Cell Hemoglobin : 29.8 pg  Mean Cell Hemoglobin Concentration : 29.7 gm/dL  Auto Neutrophil # : 1.92 K/uL  Auto Lymphocyte # : 0.79 K/uL  Auto Monocyte # : 0.17 K/uL  Auto Eosinophil # : 0.00 K/uL  Auto Basophil # : 0.00 K/uL  Auto Neutrophil % : 66.0 %  Auto Lymphocyte % : 27.0 %  Auto Monocyte % : 6.0 %  Auto Eosinophil % : 0.0 %  Auto Basophil % : 0.0 %        CARDIAC MARKERS:                              8.7    2.91  )-----------( 159      ( 16 Mar 2019 05:53 )             29.3       03-16    144  |  112<H>  |  80<H>  ----------------------------<  107<H>  5.2   |  23  |  2.08<H>    Ca    8.3<L>      16 Mar 2019 05:53  Phos  5.3     03-16  Mg     2.4     03-16    TPro  6.6  /  Alb  2.6<L>  /  TBili  0.2  /  DBili  x   /  AST  23  /  ALT  44  /  AlkPhos  77  03-16            proBNP: Serum Pro-Brain Natriuretic Peptide: 8592 pg/mL (03-13 @ 13:16)    Lipid Profile: Cholesterol 146  LDL 38    TG 25    HgA1c: Hemoglobin A1C, Whole Blood: 4.8 % (03-14 @ 10:39)    TSH: Thyroid Stimulating Hormone, Serum: 3.74 uU/mL (03-14 @ 06:39)    ABG - ( 16 Mar 2019 19:38 )  pH, Arterial: 7.28  pH, Blood: x     /  pCO2: 56    /  pO2: 103   / HCO3: 25    / Base Excess: -1.6  /  SaO2: 98

## 2019-03-16 NOTE — PROGRESS NOTE ADULT - SUBJECTIVE AND OBJECTIVE BOX
INTERVAL HPI/OVERNIGHT EVENTS: Pt was off the bipap last day but in the evening started having wheezing, pt was given stat solumedrol 40mg and put back on bipap.    PRESSORS: No    ANTIBIOTICS:                      Antimicrobial:    Cardiovascular:  amLODIPine   Tablet 10 milliGRAM(s) Oral daily  furosemide   Injectable 40 milliGRAM(s) IV Push every 12 hours  hydrALAZINE 25 milliGRAM(s) Oral three times a day    Pulmonary:  ALBUTerol    90 MICROgram(s) HFA Inhaler 2 Puff(s) Inhalation every 6 hours PRN    Hematalogic:  heparin  Injectable 5000 Unit(s) SubCutaneous every 12 hours    Other:  acetaminophen   Tablet .. 650 milliGRAM(s) Oral every 8 hours PRN  calcium acetate 667 milliGRAM(s) Oral four times a day with meals  chlorhexidine 4% Liquid 1 Application(s) Topical every 12 hours  ergocalciferol 36167 Unit(s) Oral <User Schedule>  pantoprazole    Tablet 40 milliGRAM(s) Oral before breakfast    acetaminophen   Tablet .. 650 milliGRAM(s) Oral every 8 hours PRN  ALBUTerol    90 MICROgram(s) HFA Inhaler 2 Puff(s) Inhalation every 6 hours PRN  amLODIPine   Tablet 10 milliGRAM(s) Oral daily  calcium acetate 667 milliGRAM(s) Oral four times a day with meals  chlorhexidine 4% Liquid 1 Application(s) Topical every 12 hours  ergocalciferol 45664 Unit(s) Oral <User Schedule>  furosemide   Injectable 40 milliGRAM(s) IV Push every 12 hours  heparin  Injectable 5000 Unit(s) SubCutaneous every 12 hours  hydrALAZINE 25 milliGRAM(s) Oral three times a day  pantoprazole    Tablet 40 milliGRAM(s) Oral before breakfast    Drug Dosing Weight  Height (cm): 152.4 (13 Mar 2019 11:40)  Weight (kg): 58.5 (13 Mar 2019 11:40)  BMI (kg/m2): 25.2 (13 Mar 2019 11:40)  BSA (m2): 1.55 (13 Mar 2019 11:40)    CENTRAL LINE: No  GUZMAN: Yes 3/14    A-LINE:  No    PMH -reviewed admission note, no change since admission    ICU Vital Signs Last 24 Hrs  T(C): 37.1 (15 Mar 2019 21:00), Max: 37.3 (15 Mar 2019 06:00)  T(F): 98.7 (15 Mar 2019 21:00), Max: 99.2 (15 Mar 2019 06:00)  HR: 61 (16 Mar 2019 03:00) (61 - 77)  BP: 138/51 (16 Mar 2019 03:00) (130/42 - 166/43)  BP(mean): 73 (16 Mar 2019 03:00) (63 - 104)  ABP: --  ABP(mean): --  RR: 18 (16 Mar 2019 03:00) (12 - 25)  SpO2: 100% (16 Mar 2019 01:00) (95% - 100%)      ABG - ( 15 Mar 2019 04:40 )  pH, Arterial: 7.24  pH, Blood: x     /  pCO2: 54    /  pO2: 136   / HCO3: 22    / Base Excess: -4.5  /  SaO2: 99                    03-14 @ 07:01  -  03-15 @ 07:00  --------------------------------------------------------  IN: 120 mL / OUT: 1785 mL / NET: -1665 mL            PHYSICAL EXAM:  GENERAL: patient on bpap  EYES: PERRLA< EOMI  NECK: Supple, No JVD; Normal thyroid; Trachea midline  NERVOUS SYSTEM:  Awake & alert; Motor Strength 5/5 B/L upper and lower extremities; DTRs 2+ intact and symmetric  CHEST/LUNG: No rales, rhonchi, wheezing   HEART: Regular rate and rhythm; No murmurs,   ABDOMEN: Soft, Nontender, Nondistended; Bowel sounds present  EXTREMITIES:  2+ Peripheral Pulses, No clubbing, cyanosis, or edema      LABS:  CBC Full  -  ( 15 Mar 2019 06:20 )  WBC Count : 3.79 K/uL  Hemoglobin : 8.5 g/dL  Hematocrit : 28.3 %  Platelet Count - Automated : 162 K/uL  Mean Cell Volume : 102.2 fl  Mean Cell Hemoglobin : 30.7 pg  Mean Cell Hemoglobin Concentration : 30.0 gm/dL  Auto Neutrophil # : 2.10 K/uL  Auto Lymphocyte # : 1.13 K/uL  Auto Monocyte # : 0.50 K/uL  Auto Eosinophil # : 0.03 K/uL  Auto Basophil # : 0.01 K/uL  Auto Neutrophil % : 55.4 %  Auto Lymphocyte % : 29.8 %  Auto Monocyte % : 13.2 %  Auto Eosinophil % : 0.8 %  Auto Basophil % : 0.3 %    03-15    143  |  111<H>  |  81<H>  ----------------------------<  116<H>  5.3   |  25  |  2.04<H>    Ca    8.4      15 Mar 2019 20:22  Phos  5.1     -15  Mg     2.5     -15    TPro  6.5  /  Alb  2.6<L>  /  TBili  0.2  /  DBili  x   /  AST  35  /  ALT  54  /  AlkPhos  82  -15    PT/INR - ( 14 Mar 2019 13:57 )   PT: 11.6 sec;   INR: 1.04 ratio           Urinalysis Basic - ( 14 Mar 2019 13:45 )    Color: Yellow / Appearance: Clear / S.015 / pH: x  Gluc: x / Ketone: Negative  / Bili: Negative / Urobili: Negative   Blood: x / Protein: 30 mg/dL / Nitrite: Negative   Leuk Esterase: Negative / RBC: 0-2 /HPF / WBC 0-2 /HPF   Sq Epi: x / Non Sq Epi: Occasional /HPF / Bacteria: Trace /HPF          RADIOLOGY & ADDITIONAL STUDIES REVIEWED:     Patient name: DEEDEE YOUNGBLOOD  YOB: 1924   Age: 89 (F)   MR#: [id number]  Study Date: 3/14/2019  Location: [place] [id number]: Edward Nation RDCS  Study quality: Fair  Referring Physician:  BETH DYER MD  Blood Pressure: 175/55 mmHg  Height: 152 cm  Weight: 58 kg  BSA: 1.5 m2  ------------------------------------------------------------------------    PROCEDURE: Transthoracic echocardiogram with 2-D, M-Mode  and complete spectral and color flow Doppler.  INDICATION: Dyspnea, unspecified (R06.00)  HISTORY:  ------------------------------------------------------------------------  DIMENSIONS:  Dimensions:     Normal Values:  LA:     4.5 cm    2.0 - 4.0 cm  Ao:     2.7 cm    2.0 - 3.8 cm  SEPTUM: 1.3 cm    0.6 - 1.2 cm  PWT:  1.3 cm    0.6 - 1.1 cm  LVIDd:  4.2 cm    3.0 - 5.6 cm  LVIDs:  2.3 cm    1.8 - 4.0 cm      Derived Variables:  LVMI: 130 g/m2  RWT: 0.61  Ejection Fraction Visual Estimate: >70 %  Peak Velocity (m/sec): AoV=2.9  ------------------------------------------------------------------------  OBSERVATIONS:  Mitral Valve: Mild posterior mitral annular calcification.  Mild to moderate mitral regurgitation.  Aortic Root: Normal aortic root.  Aortic Valve: Calcified, probably trileaflet aortic valve  withdecreased opening. Peak transaortic valve gradient  equals 35.1 mm Hg, mean transaortic valve gradient equals  17 mm Hg, estimated aortic valve area equals 1.3 sqcm (by  continuity equation), consistent with moderate aortic  stenosis. Mild aortic regurgitation.  Left Atrium: Severely dilated left atrium.  LA volume index  = 73 cc/m2.  Left Ventricle: Hyperdynamic left ventricular systolic  function (EF >70%). No regional wall motion abnormalities.  Severe concentric left ventricular hypertrophy. Grade III  diastolic dysfunction.  Right Heart: Mild right atrial enlargement. Normal right  ventricular size and systolic function (TAPSE 2.6 cm).  Normal tricuspid valve. Moderate to severe tricuspid  regurgitation. Pulmonic valve not well seen. Trace pulmonic  insufficiency is noted.  Pericardium/PleuraTrivial pericardial effusion is seen.  Left pleural effusion.  Hemodynamic: RA Pressure is 8 mm Hg. RV systolic pressure  is moderately increased at  49 mm Hg.  ------------------------------------------------------------------------  CONCLUSIONS:  1. Mild posterior mitral annular calcification. Mild to  moderate mitral regurgitation.  2. Calcified, probably trileaflet aortic valve with  decreased opening. Moderate aortic stenosis. Mild aortic  regurgitation.  3. Normal aortic root.  4. Severely dilated left atrium.  LA volume index = 73  cc/m2.  5. Severe concentric left ventricular hypertrophy.  6. Hyperdynamic left ventricular systolic function (EF  >70%).  7. Grade III diastolic dysfunction.  8. Mild right atrial enlargement.  9. Normal right ventricular size and systolic function  (TAPSE 2.6 cm).  10. RV systolic pressure is moderately increased at  49 mm  Hg.  11. Normal tricuspid valve. Moderate to severe tricuspid  regurgitation.  12. Pulmonic valve not well seen. Trace pulmonic  insufficiency is noted.  13. Trivial pericardial effusion is seen.  14. Left pleural effusion.    *** No previous Echo exam.  ------------------------------------------------------------------------  Confirmed on  3/14/2019 - 15:51:21 by Fredrick Sargent MD  ------------------------------------------------------------------------      GOALS OF CARE DISCUSSION WITH PATIENT/FAMILY/PROXY:    CRITICAL CARE TIME SPENT: 35 minutes

## 2019-03-16 NOTE — PROGRESS NOTE ADULT - ASSESSMENT
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  xr chest with CHF and pleural effusion, continue diuretics and fluid restriction      CHF due to Diastolic CHF, valvular disease and LAE with pulmonary hypertension plus CKD.      Anemia work up in progress.    COPD.    Fluid status improved , increased serum sodium , side effect of Diuretic      Increased in PO4 due to renal failure, avoid high  dose of vitamin D at this time.  Follow po4  Follow PTH.    Urine for microalbumin and protein.

## 2019-03-16 NOTE — PROGRESS NOTE ADULT - ASSESSMENT
89 y/o F from [facility] w/ PMHx of GERD, HTN, anemia, colitis, rectal bleeding, chronic ankle swelling, presents to ED due to shortness of breath for 2 days admitted for management of hypercapnic resp failure likely secondary to pulmonary edema and placed on NIV BIPAP.     Discussed GOC with daughter Ms. Briscoe over phone [phone], who wants the pt to be FULL CODE.    1) Respiratory failure with hypercapnea:  ABG s/o uncompensated respiratory acidosis which could be secondary to pulmonary edema may have contributed to encephalopathy.  Currently pt is more awake, following verbal commands  CXR s/o mild pulmonary congestion  Pulmonary exam improving  Continue lasix 40 mg IV BID  Continue BIPAP support  Strict I/Os  Start DASH diet    2) Pulmonary edema  Pt is tachypneic with CXR s/o mild pulmonary congestion  Echo with grade 3 DD, moderate AS, mod to severe TR  Management as above  Cardiology- Dr. Medina     3) DENEEN   PT has BUN/ creat of 77/2.03  could have underlying CKD, unknown baseline.   Hyperkalemic to 5.5 today, cocktail given--> 5.3-->5.3 resolved   On phoslo for hyperphosphatemia  Monitor BMP  f/u [facility]  Nephrology- Dr Guerrero    4) HTN  Pt has Hx of HTN, will continue home dose of hydralazine, norvasc and lasix  monitor BP closely    5) Anemia  PT has Hb 7.0 on admission, which improved to 9.3 s/p 1 PRBC  stool occult negative  pt has hx of rectal bleed, but no episodes of melena or hematemesis since admission  macrocytic anemia but Vit b12 and folate levels are WNL  Could have some underlying MDS or MM,  f/u SPEP, UPEP  Monitor CBC  complete anemia profile was not sent on admission prior to PRBC transfusion.    6) need for prophylactic measure  DVT ppx with heparin SQ  GI ppx with protonix for GERD.    7) GOC discussion  Discussed GOC with daughter Ms. Briscoe over phone [phone], who wants the pt to be FULL CODE.

## 2019-03-16 NOTE — PHYSICAL THERAPY INITIAL EVALUATION ADULT - GENERAL OBSERVATIONS, REHAB EVAL
Patient received supine, HOB elevated, alert and awake, + villalobos, 3L/min O2 via NC, cardiac monitor, NAD.

## 2019-03-16 NOTE — PROGRESS NOTE ADULT - SUBJECTIVE AND OBJECTIVE BOX
CHIEF COMPLAINT:Patient is a 89y old  Female who presents with a chief complaint of shortness of breath.Pt appears comfortable.    	  REVIEW OF SYSTEMS:  CONSTITUTIONAL: No fever, weight loss, or fatigue  EYES: No eye pain, visual disturbances, or discharge  ENT:  No difficulty hearing, tinnitus, vertigo; No sinus or throat pain  NECK: No pain or stiffness  RESPIRATORY: No cough, wheezing, chills or hemoptysis; No Shortness of Breath  CARDIOVASCULAR: No chest pain, palpitations, passing out, dizziness, or leg swelling  GASTROINTESTINAL: No abdominal or epigastric pain. No nausea, vomiting, or hematemesis; No diarrhea or constipation. No melena or hematochezia.  GENITOURINARY: No dysuria, frequency, hematuria, or incontinence  NEUROLOGICAL: No headaches, memory loss, loss of strength, numbness, or tremors  SKIN: No itching, burning, rashes, or lesions   LYMPH Nodes: No enlarged glands  ENDOCRINE: No heat or cold intolerance; No hair loss  MUSCULOSKELETAL: No joint pain or swelling; No muscle, back, or extremity pain  PSYCHIATRIC: No depression, anxiety, mood swings, or difficulty sleeping  HEME/LYMPH: No easy bruising, or bleeding gums  ALLERGY AND IMMUNOLOGIC: No hives or eczema	      PHYSICAL EXAM:  T(C): 35.9 (03-16-19 @ 06:09), Max: 37.1 (03-15-19 @ 21:00)  HR: 63 (03-16-19 @ 08:41) (56 - 75)  BP: 164/51 (03-16-19 @ 06:09) (125/90 - 164/51)  RR: 21 (03-16-19 @ 08:00) (11 - 25)  SpO2: 95% (03-16-19 @ 08:41) (95% - 100%)    I&O's Summary    15 Mar 2019 07:01  -  16 Mar 2019 07:00  --------------------------------------------------------  IN: 1320 mL / OUT: 1870 mL / NET: -550 mL        Appearance: Normal	  HEENT:   Normal oral mucosa, PERRL, EOMI	  Lymphatic: No lymphadenopathy  Cardiovascular: Normal S1 S2, No JVD, No murmurs, No edema  Respiratory: Lungs clear to auscultation	  Psychiatry: A & O x 3, Mood & affect appropriate  Gastrointestinal:  Soft, Non-tender, + BS	  Skin: No rashes, No ecchymoses, No cyanosis	  Neurologic: Non-focal  Extremities: Normal range of motion, No clubbing, cyanosis or edema  Vascular: Peripheral pulses palpable 2+ bilaterally    MEDICATIONS  (STANDING):  amLODIPine   Tablet 10 milliGRAM(s) Oral daily  calcium acetate 667 milliGRAM(s) Oral four times a day with meals  chlorhexidine 4% Liquid 1 Application(s) Topical every 12 hours  ergocalciferol 89365 Unit(s) Oral <User Schedule>  furosemide   Injectable 40 milliGRAM(s) IV Push every 12 hours  heparin  Injectable 5000 Unit(s) SubCutaneous every 12 hours  hydrALAZINE 25 milliGRAM(s) Oral three times a day  pantoprazole    Tablet 40 milliGRAM(s) Oral before breakfast        	  LABS:	 	                       8.7    2.91  )-----------( 159      ( 16 Mar 2019 05:53 )             29.3     03-16    144  |  112<H>  |  80<H>  ----------------------------<  107<H>  5.2   |  23  |  2.08<H>    Ca    8.3<L>      16 Mar 2019 05:53  Phos  5.3     03-16  Mg     2.4     03-16    TPro  6.6  /  Alb  2.6<L>  /  TBili  0.2  /  DBili  x   /  AST  23  /  ALT  44  /  AlkPhos  77  03-16    proBNP: Serum Pro-Brain Natriuretic Peptide: 8592 pg/mL (03-13 @ 13:16)    Lipid Profile: Cholesterol 146  LDL 38    TG 25    HgA1c: Hemoglobin A1C, Whole Blood: 4.8 % (03-14 @ 10:39)    TSH: Thyroid Stimulating Hormone, Serum: 3.74 uU/mL (03-14 @ 06:39)

## 2019-03-16 NOTE — PROGRESS NOTE ADULT - SUBJECTIVE AND OBJECTIVE BOX
Problem List:  CKD   CHF and COPD    PAST MEDICAL & SURGICAL HISTORY:  Rectal bleeding  H/O gastroesophageal reflux (GERD)  Colitis  Anemia  CHF (congestive heart failure)  No significant past surgical history      iodine (Unknown)  meperidine (Rash)  oxycodone (Other; Nausea)      MEDICATIONS  (STANDING):  amLODIPine   Tablet 10 milliGRAM(s) Oral daily  calcium acetate 667 milliGRAM(s) Oral four times a day with meals  chlorhexidine 4% Liquid 1 Application(s) Topical every 12 hours  ergocalciferol 21244 Unit(s) Oral <User Schedule>  furosemide   Injectable 40 milliGRAM(s) IV Push every 12 hours  heparin  Injectable 5000 Unit(s) SubCutaneous every 12 hours  hydrALAZINE 25 milliGRAM(s) Oral three times a day  pantoprazole    Tablet 40 milliGRAM(s) Oral before breakfast    MEDICATIONS  (PRN):  acetaminophen   Tablet .. 650 milliGRAM(s) Oral every 8 hours PRN Moderate Pain (4 - 6)  ALBUTerol    90 MICROgram(s) HFA Inhaler 2 Puff(s) Inhalation every 6 hours PRN Shortness of Breath and/or Wheezing    Basic Metabolic Panel (03.15.19 @ 20:22)    Sodium, Serum: 143 mmol/L    Potassium, Serum: 5.3 mmol/L    Chloride, Serum: 111 mmol/L    Carbon Dioxide, Serum: 25 mmol/L    Anion Gap, Serum: 7 mmol/L    Blood Urea Nitrogen, Serum: 81 mg/dL    Creatinine, Serum: 2.04 mg/dL    Glucose, Serum: 116 mg/dL    Calcium, Total Serum: 8.4 mg/dL    eGFR if Non : 20: Interpretative comment                        8.7    2.91  )-----------( 159      ( 16 Mar 2019 05:53 )             29.3     03-16    144  |  112<H>  |  80<H>  ----------------------------<  107<H>  5.2   |  23  |  2.08<H>    Ca    8.3<L>      16 Mar 2019 05:53  Phos  5.3     03-16  Mg     2.4     03-16    TPro  6.6  /  Alb  2.6<L>  /  TBili  0.2  /  DBili  x   /  AST  23  /  ALT  44  /  AlkPhos  77  03-16        Potassium, Random Urine: 20 mmol/L (03-15 @ 15:31)  Creatinine, Random Urine: 31 mg/dL (03-15 @ 15:31)  Osmolality, Random Urine: 350 mos/kg (03-14 @ 13:45)  Chloride, Random Urine: 90 mmol/L (03-14 @ 13:43)  Sodium, Random Urine: 77 mmol/L (03-14 @ 13:43)  Creatinine, Random Urine: 46 mg/dL (03-14 @ 13:43)    Portable chest radiograph is compared to 3/13/2019.    The heart is enlarged. There is pulmonary vascular congestion with   bilateral pleural effusions. The appearance is similar to the present   day. There is osteopenia and degenerative change of the bony structures.    Impression: Cardiomegaly with pulmonary vascular congestion and bilateral   pleuraleffusions      REVIEW OF SYSTEMS:  General: no fever no chills, no weight loss.  EYES/ENT: No visual changes;  No vertigo, no headache.  NECK: No pain or stiffness  RESPIRATORY: DYSPNEA.  CARDIOVASCULAR: No chest pain or palpitations. No Edema  GASTROINTESTINAL: No abdominal or epigastric pain. No nausea, vomiting. No diarrhea or constipation. No melena.  GENITOURINARY: No dysuria.          VITALS:  T(F): 96.7 (03-16-19 @ 06:09), Max: 98.7 (03-15-19 @ 21:00)  HR: 65 (03-16-19 @ 11:35)  BP: 150/45 (03-16-19 @ 11:35)  RR: 14 (03-16-19 @ 11:35)  SpO2: 98% (03-16-19 @ 11:35)  Wt(kg): --    03-15 @ 07:01  -  03-16 @ 07:00  --------------------------------------------------------  IN: 1320 mL / OUT: 1870 mL / NET: -550 mL        PHYSICAL EXAM:  Constitutional: well developed, no diaphoresis, no distress.  Neck: No JVD, no carotid bruit, supple, no adenopathy  Respiratory: PROLONGED EXPIRATORY PHASE, NO wheezing.  Cardiovascular: S1, S2, RRR, no pericardial rub, no murmur  Abdomen: BS+, soft, no tenderness, no bruit  Pelvis: bladder nondistended  Extremities: No cyanosis or clubbing. No peripheral edema.

## 2019-03-16 NOTE — PROGRESS NOTE ADULT - ASSESSMENT
Patient admitted for shortness of breath, likely 2/2 CHF exacerbation given elevated proBNP and bilateral pleural effusions, patient also found to have symptomatic anemia, however guaiac negative, macrocytic, f/u B12 and folate levels in AM    Pt is FULL CODE    PRIMARY TEAM TO CALL PREVIOUS PCP DR. ALEMAN IN AM FOR FURTHER MEDICAL HISTORY INFORMATION AS WELL AS BASELINE CREATININE  Dr. Aleman [phone], [phone]      Problem 1: dyspnea  acute CHF exacerbation vs COPD, unknown if pt smoked in the past, however currently denying  s/p 40mg IV lasix, c/w 40mg IV lasix daily for now  c/w duonebs q6h  CXR shows bilateral pleural effusions, elevated proBNP on admission  continue to monitor BMP and fluid status  c/w telemetry monitoring for now  initial troponin 0.063  f/u T2, T3  f/u echocardiogram  f/u cardiology consult - Dr. Medina  f/u pulmonology consult - Dr. Dumont    Problem 2: symptomatic anemia  hemoglobin 7 on admission, guaiac negative, macrocytic on admission CBC, may indicate Vitamin B12 vs folate deficiency  s/p 1U PRBC transfusion (started prior to anemia panel collection)  f/u repeat CBC post transfusion at 12 AM  f/u CBC in AM  f/u Vit B12, folate in AM    Problem 3: HTN  c/w hydralazine TID, amlodipine, holding losartan given creatinine 1.77, unknown baseline  continue to monitor BP    Problem 4: CKD  DENEEN on CKD vs worsening CKD?  unknown baseline creatinine  will continue with IV diuresis as above  f/u urine lytes  f/u BMP in AM  PRIMARY TEAM TO ESTABLISH BASELINE CREATININE WITH PREVIOUS PCP Dr. Aleman as above    Problem 5: GERD  c/w protonix daily    Problem 6: Need for prophylactic measure  IMPROVE VTE Individual Risk Assessment          RISK                                                          Points  [  ] Previous VTE                                                3  [  ] Thrombophilia                                             2  [ x ] Lower limb paralysis                                   2        (unable to hold up >15 seconds)    [  ] Current Cancer                                             2         (within 6 months)  [ x ] Immobilization > 24 hrs                              1  [  ] ICU/CCU stay > 24 hours                             1  [ x ] Age > 60                                                         1    IMPROVE VTE Score: 4    c/w heparin for vte prophylaxis

## 2019-03-16 NOTE — PROGRESS NOTE ADULT - NSICDXPROBLEM_GEN_ALL_CORE_FT
PROBLEM DIAGNOSES  Problem: Hyperkalemia  Assessment and Plan: - K+ 5.4   - Monitor CMP  - Renal F/U   - GI PPX       Problem: Renal insufficiency  Assessment and Plan:  - Chronic  - Monitor labs.  - BUN 77, Creat 2.03.   - Renal F/U  - Continue meds  - Avoid nephrotoxic drugs.         Problem: Hypercapnic respiratory failure  Assessment and Plan: - Likely 2nd to pulmonary edema.     XR shows increased interstitial lung markings with nonspecific right hilum prominence - unable to r/o mass or lymphadenopathy on XR.   - Currently in ICU   - Off Bi-pap  Oxygen supp  - Bronchodilators  - Steroids  - IV Lasix    - DVT PPX  -Follow up CXR PROBLEM DIAGNOSES  Problem: Hyperkalemia  Assessment and Plan: - K+ 5.2 now  - Monitor CMP  - Renal F/U       Problem: Renal insufficiency  Assessment and Plan:  - Chronic  - Monitor labs.  - Renal F/U  - Continue meds  - Avoid nephrotoxic drugs.         Problem: Hypercapnic respiratory failure  Assessment and Plan: - Likely 2nd to pulmonary edema.     XR shows increased interstitial lung markings with nonspecific right hilum prominence - unable to r/o mass or lymphadenopathy on XR.   - Currently in ICU   - Off Bi-pap  Oxygen supp  - Bronchodilators  - Steroids  - IV Lasix    - DVT PPX  -Follow up CXR

## 2019-03-16 NOTE — PROGRESS NOTE ADULT - SUBJECTIVE AND OBJECTIVE BOX
Patient is a 89y old  Female who presents with a chief complaint of shortness of breath (16 Mar 2019 11:43)    Patient alert, awake, lying in bed in NAD. No cough. No SOB.    INTERVAL HPI/OVERNIGHT EVENTS:      VITAL SIGNS:  T(F): 96.7 (19 @ 06:09)  HR: 63 (19 @ 08:41)  BP: 164/51 (19 @ 06:09)  RR: 21 (19 @ 08:00)  SpO2: 95% (19 @ 08:41)  Wt(kg): --  I&O's Detail    15 Mar 2019 07:01  -  16 Mar 2019 07:00  --------------------------------------------------------  IN:    Oral Fluid: 1220 mL    Solution: 100 mL  Total IN: 1320 mL    OUT:    Indwelling Catheter - Urethral: 1870 mL  Total OUT: 1870 mL    Total NET: -550 mL              REVIEW OF SYSTEMS:    CONSTITUTIONAL:  No fevers, chills, sweats    HEENT:  Eyes:  No diplopia or blurred vision. ENT:  No earache, sore throat or runny nose.    CARDIOVASCULAR:  No pressure, squeezing, tightness, or heaviness about the chest; no palpitations.    RESPIRATORY:  Per HPI    GASTROINTESTINAL:  No abdominal pain, nausea, vomiting or diarrhea.    GENITOURINARY:  No dysuria, frequency or urgency.    NEUROLOGIC:  No paresthesias, fasciculations, seizures or weakness.    PSYCHIATRIC:  No disorder of thought or mood.      PHYSICAL EXAM:    Constitutional: Well developed and nourished  Eyes:Perrla  ENMT: normal  Neck:supple  Respiratory: CTAB.  Cardiovascular: S1 S2 regular  Gastrointestinal: Soft, Non tender  Extremities: No edema  Vascular:normal  Neurological:Awake, alert,Ox3  Musculoskeletal:Normal      MEDICATIONS  (STANDING):  amLODIPine   Tablet 10 milliGRAM(s) Oral daily  calcium acetate 667 milliGRAM(s) Oral four times a day with meals  chlorhexidine 4% Liquid 1 Application(s) Topical every 12 hours  ergocalciferol 49592 Unit(s) Oral <User Schedule>  furosemide   Injectable 40 milliGRAM(s) IV Push every 12 hours  heparin  Injectable 5000 Unit(s) SubCutaneous every 12 hours  hydrALAZINE 25 milliGRAM(s) Oral three times a day  pantoprazole    Tablet 40 milliGRAM(s) Oral before breakfast    MEDICATIONS  (PRN):  acetaminophen   Tablet .. 650 milliGRAM(s) Oral every 8 hours PRN Moderate Pain (4 - 6)  ALBUTerol    90 MICROgram(s) HFA Inhaler 2 Puff(s) Inhalation every 6 hours PRN Shortness of Breath and/or Wheezing      Allergies    iodine (Unknown)  meperidine (Rash)  oxycodone (Other; Nausea)    Intolerances        LABS:                        8.7    2.91  )-----------( 159      ( 16 Mar 2019 05:53 )             29.3     03-    144  |  112<H>  |  80<H>  ----------------------------<  107<H>  5.2   |  23  |  2.08<H>    Ca    8.3<L>      16 Mar 2019 05:53  Phos  5.3     -  Mg     2.4         TPro  6.6  /  Alb  2.6<L>  /  TBili  0.2  /  DBili  x   /  AST  23  /  ALT  44  /  AlkPhos  77  -    PT/INR - ( 14 Mar 2019 13:57 )   PT: 11.6 sec;   INR: 1.04 ratio           Urinalysis Basic - ( 14 Mar 2019 13:45 )    Color: Yellow / Appearance: Clear / S.015 / pH: x  Gluc: x / Ketone: Negative  / Bili: Negative / Urobili: Negative   Blood: x / Protein: 30 mg/dL / Nitrite: Negative   Leuk Esterase: Negative / RBC: 0-2 /HPF / WBC 0-2 /HPF   Sq Epi: x / Non Sq Epi: Occasional /HPF / Bacteria: Trace /HPF      ABG - ( 16 Mar 2019 08:47 )  pH, Arterial: 7.28  pH, Blood: x     /  pCO2: 53    /  pO2: 118   / HCO3: 24    / Base Excess: -2.3  /  SaO2: 98                    CAPILLARY BLOOD GLUCOSE      POCT Blood Glucose.: 119 mg/dL (15 Mar 2019 20:15)  POCT Blood Glucose.: 91 mg/dL (15 Mar 2019 16:14)    pro-bnp 8592 03-13 @ 13:16     d-dimer --   @ 13:16      RADIOLOGY & ADDITIONAL TESTS:    CXR:  < from: Xray Chest 1 View- PORTABLE-Routine (19 @ 11:12) >  Impression: Cardiomegaly with pulmonary vascular congestion and bilateral   pleuraleffusions        < end of copied text >    Ct scan chest:    ekg;    echo: Patient is a 89y old  Female who presents with a chief complaint of shortness of breath (16 Mar 2019 11:43)    Patient alert, awake, lying in bed in NAD. No cough. No SOB. Currently on nasal cannula oxygen    INTERVAL HPI/OVERNIGHT EVENTS:      VITAL SIGNS:  T(F): 96.7 (19 @ 06:09)  HR: 63 (19 @ 08:41)  BP: 164/51 (19 @ 06:09)  RR: 21 (19 @ 08:00)  SpO2: 95% (19 @ 08:41)  Wt(kg): --  I&O's Detail    15 Mar 2019 07:01  -  16 Mar 2019 07:00  --------------------------------------------------------  IN:    Oral Fluid: 1220 mL    Solution: 100 mL  Total IN: 1320 mL    OUT:    Indwelling Catheter - Urethral: 1870 mL  Total OUT: 1870 mL    Total NET: -550 mL              REVIEW OF SYSTEMS:    CONSTITUTIONAL:  No fevers, chills, sweats    HEENT:  Eyes:  No diplopia or blurred vision. ENT:  No earache, sore throat or runny nose.    CARDIOVASCULAR:  No pressure, squeezing, tightness, or heaviness about the chest; no palpitations.    RESPIRATORY:  Per HPI    GASTROINTESTINAL:  No abdominal pain, nausea, vomiting or diarrhea.    GENITOURINARY:  No dysuria, frequency or urgency.    NEUROLOGIC:  No paresthesias, fasciculations, seizures or weakness.    PSYCHIATRIC:  No disorder of thought or mood.      PHYSICAL EXAM:    Constitutional: Well developed and nourished  Eyes:Perrla  ENMT: normal  Neck:supple  Respiratory: Rales post  Cardiovascular: S1 S2 regular  Gastrointestinal: Soft, Non tender  Extremities: No edema  Vascular:normal  Neurological:Awake, alert,Ox3  Musculoskeletal:Normal      MEDICATIONS  (STANDING):  amLODIPine   Tablet 10 milliGRAM(s) Oral daily  calcium acetate 667 milliGRAM(s) Oral four times a day with meals  chlorhexidine 4% Liquid 1 Application(s) Topical every 12 hours  ergocalciferol 00182 Unit(s) Oral <User Schedule>  furosemide   Injectable 40 milliGRAM(s) IV Push every 12 hours  heparin  Injectable 5000 Unit(s) SubCutaneous every 12 hours  hydrALAZINE 25 milliGRAM(s) Oral three times a day  pantoprazole    Tablet 40 milliGRAM(s) Oral before breakfast    MEDICATIONS  (PRN):  acetaminophen   Tablet .. 650 milliGRAM(s) Oral every 8 hours PRN Moderate Pain (4 - 6)  ALBUTerol    90 MICROgram(s) HFA Inhaler 2 Puff(s) Inhalation every 6 hours PRN Shortness of Breath and/or Wheezing      Allergies    iodine (Unknown)  meperidine (Rash)  oxycodone (Other; Nausea)    Intolerances        LABS:                        8.7    2.91  )-----------( 159      ( 16 Mar 2019 05:53 )             29.3     03-    144  |  112<H>  |  80<H>  ----------------------------<  107<H>  5.2   |  23  |  2.08<H>    Ca    8.3<L>      16 Mar 2019 05:53  Phos  5.3     03-  Mg     2.4     -    TPro  6.6  /  Alb  2.6<L>  /  TBili  0.2  /  DBili  x   /  AST  23  /  ALT  44  /  AlkPhos  77  03-16    PT/INR - ( 14 Mar 2019 13:57 )   PT: 11.6 sec;   INR: 1.04 ratio           Urinalysis Basic - ( 14 Mar 2019 13:45 )    Color: Yellow / Appearance: Clear / S.015 / pH: x  Gluc: x / Ketone: Negative  / Bili: Negative / Urobili: Negative   Blood: x / Protein: 30 mg/dL / Nitrite: Negative   Leuk Esterase: Negative / RBC: 0-2 /HPF / WBC 0-2 /HPF   Sq Epi: x / Non Sq Epi: Occasional /HPF / Bacteria: Trace /HPF      ABG - ( 16 Mar 2019 08:47 )  pH, Arterial: 7.28  pH, Blood: x     /  pCO2: 53    /  pO2: 118   / HCO3: 24    / Base Excess: -2.3  /  SaO2: 98                    CAPILLARY BLOOD GLUCOSE      POCT Blood Glucose.: 119 mg/dL (15 Mar 2019 20:15)  POCT Blood Glucose.: 91 mg/dL (15 Mar 2019 16:14)    pro-bnp 8592  @ 13:16     d-dimer --   @ 13:16      RADIOLOGY & ADDITIONAL TESTS:    CXR:  < from: Xray Chest 1 View- PORTABLE-Routine (19 @ 11:12) >  Impression: Cardiomegaly with pulmonary vascular congestion and bilateral   pleural effusions        < end of copied text >    Ct scan chest:    ekg;    echo:

## 2019-03-17 LAB
ALBUMIN SERPL ELPH-MCNC: 2.6 G/DL — LOW (ref 3.5–5)
ALP SERPL-CCNC: 66 U/L — SIGNIFICANT CHANGE UP (ref 40–120)
ALT FLD-CCNC: 35 U/L DA — SIGNIFICANT CHANGE UP (ref 10–60)
ANION GAP SERPL CALC-SCNC: 6 MMOL/L — SIGNIFICANT CHANGE UP (ref 5–17)
AST SERPL-CCNC: 15 U/L — SIGNIFICANT CHANGE UP (ref 10–40)
BASE EXCESS BLDA CALC-SCNC: 0.2 MMOL/L — SIGNIFICANT CHANGE UP (ref -2–2)
BASOPHILS # BLD AUTO: 0.01 K/UL — SIGNIFICANT CHANGE UP (ref 0–0.2)
BASOPHILS NFR BLD AUTO: 0.2 % — SIGNIFICANT CHANGE UP (ref 0–2)
BILIRUB SERPL-MCNC: 0.2 MG/DL — SIGNIFICANT CHANGE UP (ref 0.2–1.2)
BLOOD GAS COMMENTS ARTERIAL: SIGNIFICANT CHANGE UP
BUN SERPL-MCNC: 93 MG/DL — HIGH (ref 7–18)
CALCIUM SERPL-MCNC: 8.2 MG/DL — LOW (ref 8.4–10.5)
CHLORIDE SERPL-SCNC: 112 MMOL/L — HIGH (ref 96–108)
CO2 SERPL-SCNC: 26 MMOL/L — SIGNIFICANT CHANGE UP (ref 22–31)
CREAT SERPL-MCNC: 2.15 MG/DL — HIGH (ref 0.5–1.3)
EOSINOPHIL # BLD AUTO: 0.03 K/UL — SIGNIFICANT CHANGE UP (ref 0–0.5)
EOSINOPHIL NFR BLD AUTO: 0.7 % — SIGNIFICANT CHANGE UP (ref 0–6)
GLUCOSE SERPL-MCNC: 82 MG/DL — SIGNIFICANT CHANGE UP (ref 70–99)
HCO3 BLDA-SCNC: 27 MMOL/L — SIGNIFICANT CHANGE UP (ref 23–27)
HCT VFR BLD CALC: 30.1 % — LOW (ref 34.5–45)
HGB BLD-MCNC: 9 G/DL — LOW (ref 11.5–15.5)
HOROWITZ INDEX BLDA+IHG-RTO: 40 — SIGNIFICANT CHANGE UP
IMM GRANULOCYTES NFR BLD AUTO: 0.2 % — SIGNIFICANT CHANGE UP (ref 0–1.5)
LYMPHOCYTES # BLD AUTO: 1.25 K/UL — SIGNIFICANT CHANGE UP (ref 1–3.3)
LYMPHOCYTES # BLD AUTO: 31.1 % — SIGNIFICANT CHANGE UP (ref 13–44)
MAGNESIUM SERPL-MCNC: 2.4 MG/DL — SIGNIFICANT CHANGE UP (ref 1.6–2.6)
MCHC RBC-ENTMCNC: 29.9 GM/DL — LOW (ref 32–36)
MCHC RBC-ENTMCNC: 30.2 PG — SIGNIFICANT CHANGE UP (ref 27–34)
MCV RBC AUTO: 101 FL — HIGH (ref 80–100)
MONOCYTES # BLD AUTO: 0.44 K/UL — SIGNIFICANT CHANGE UP (ref 0–0.9)
MONOCYTES NFR BLD AUTO: 10.9 % — SIGNIFICANT CHANGE UP (ref 2–14)
NEUTROPHILS # BLD AUTO: 2.28 K/UL — SIGNIFICANT CHANGE UP (ref 1.8–7.4)
NEUTROPHILS NFR BLD AUTO: 56.9 % — SIGNIFICANT CHANGE UP (ref 43–77)
NRBC # BLD: 0 /100 WBCS — SIGNIFICANT CHANGE UP (ref 0–0)
PCO2 BLDA: 58 MMHG — HIGH (ref 32–46)
PH BLDA: 7.28 — LOW (ref 7.35–7.45)
PHOSPHATE SERPL-MCNC: 5.1 MG/DL — HIGH (ref 2.5–4.5)
PLATELET # BLD AUTO: 160 K/UL — SIGNIFICANT CHANGE UP (ref 150–400)
PO2 BLDA: 121 MMHG — HIGH (ref 74–108)
POTASSIUM SERPL-MCNC: 4.8 MMOL/L — SIGNIFICANT CHANGE UP (ref 3.5–5.3)
POTASSIUM SERPL-SCNC: 4.8 MMOL/L — SIGNIFICANT CHANGE UP (ref 3.5–5.3)
PROT SERPL-MCNC: 6.4 G/DL — SIGNIFICANT CHANGE UP (ref 6–8.3)
RBC # BLD: 2.98 M/UL — LOW (ref 3.8–5.2)
RBC # FLD: 16.2 % — HIGH (ref 10.3–14.5)
SAO2 % BLDA: 99 % — HIGH (ref 92–96)
SODIUM SERPL-SCNC: 144 MMOL/L — SIGNIFICANT CHANGE UP (ref 135–145)
WBC # BLD: 4.02 K/UL — SIGNIFICANT CHANGE UP (ref 3.8–10.5)
WBC # FLD AUTO: 4.02 K/UL — SIGNIFICANT CHANGE UP (ref 3.8–10.5)

## 2019-03-17 PROCEDURE — 71045 X-RAY EXAM CHEST 1 VIEW: CPT | Mod: 26

## 2019-03-17 PROCEDURE — 71046 X-RAY EXAM CHEST 2 VIEWS: CPT | Mod: 26

## 2019-03-17 RX ORDER — SENNA PLUS 8.6 MG/1
2 TABLET ORAL AT BEDTIME
Qty: 0 | Refills: 0 | Status: DISCONTINUED | OUTPATIENT
Start: 2019-03-17 | End: 2019-03-26

## 2019-03-17 RX ORDER — FUROSEMIDE 40 MG
60 TABLET ORAL ONCE
Qty: 0 | Refills: 0 | Status: COMPLETED | OUTPATIENT
Start: 2019-03-17 | End: 2019-03-17

## 2019-03-17 RX ORDER — FUROSEMIDE 40 MG
40 TABLET ORAL EVERY 8 HOURS
Qty: 0 | Refills: 0 | Status: DISCONTINUED | OUTPATIENT
Start: 2019-03-17 | End: 2019-03-17

## 2019-03-17 RX ORDER — DOCUSATE SODIUM 100 MG
100 CAPSULE ORAL
Qty: 0 | Refills: 0 | Status: DISCONTINUED | OUTPATIENT
Start: 2019-03-17 | End: 2019-03-26

## 2019-03-17 RX ADMIN — Medication 25 MILLIGRAM(S): at 13:18

## 2019-03-17 RX ADMIN — AMLODIPINE BESYLATE 10 MILLIGRAM(S): 2.5 TABLET ORAL at 05:51

## 2019-03-17 RX ADMIN — Medication 667 MILLIGRAM(S): at 21:28

## 2019-03-17 RX ADMIN — HEPARIN SODIUM 5000 UNIT(S): 5000 INJECTION INTRAVENOUS; SUBCUTANEOUS at 17:45

## 2019-03-17 RX ADMIN — SENNA PLUS 2 TABLET(S): 8.6 TABLET ORAL at 21:27

## 2019-03-17 RX ADMIN — Medication 100 MILLIGRAM(S): at 21:28

## 2019-03-17 RX ADMIN — Medication 40 MILLIGRAM(S): at 05:51

## 2019-03-17 RX ADMIN — Medication 25 MILLIGRAM(S): at 05:51

## 2019-03-17 RX ADMIN — CHLORHEXIDINE GLUCONATE 1 APPLICATION(S): 213 SOLUTION TOPICAL at 17:45

## 2019-03-17 RX ADMIN — Medication 667 MILLIGRAM(S): at 13:06

## 2019-03-17 RX ADMIN — Medication 25 MILLIGRAM(S): at 21:28

## 2019-03-17 RX ADMIN — Medication 60 MILLIGRAM(S): at 21:27

## 2019-03-17 RX ADMIN — CHLORHEXIDINE GLUCONATE 1 APPLICATION(S): 213 SOLUTION TOPICAL at 05:44

## 2019-03-17 RX ADMIN — PANTOPRAZOLE SODIUM 40 MILLIGRAM(S): 20 TABLET, DELAYED RELEASE ORAL at 05:52

## 2019-03-17 RX ADMIN — Medication 667 MILLIGRAM(S): at 08:33

## 2019-03-17 RX ADMIN — Medication 40 MILLIGRAM(S): at 13:18

## 2019-03-17 RX ADMIN — HEPARIN SODIUM 5000 UNIT(S): 5000 INJECTION INTRAVENOUS; SUBCUTANEOUS at 05:52

## 2019-03-17 RX ADMIN — Medication 667 MILLIGRAM(S): at 17:45

## 2019-03-17 NOTE — PROGRESS NOTE ADULT - ASSESSMENT
89 y/o F from [facility] w/ PMHx of GERD, anemia, colitis, rectal bleeding, chronic ankle swelling, presents to ED due to shortness of breath for 2 days.  1.ICU monitoring.  2.Psych f/u.  3.IV lasix.  4.CRI and hyperkalemia-Lasix,Renal f/u.  5.DM-Insulin.  6.HTN-cont bp medication.  7.GI and DVT prophylaxis.

## 2019-03-17 NOTE — PROGRESS NOTE ADULT - SUBJECTIVE AND OBJECTIVE BOX
CHIEF COMPLAINT:Patient is a 89y old  Female who presents with a chief complaint of shortness of breath.Pt appears comfortable.    	  REVIEW OF SYSTEMS:  CONSTITUTIONAL: No fever, weight loss, or fatigue  EYES: No eye pain, visual disturbances, or discharge  ENT:  No difficulty hearing, tinnitus, vertigo; No sinus or throat pain  NECK: No pain or stiffness  RESPIRATORY: No cough, wheezing, chills or hemoptysis; No Shortness of Breath  CARDIOVASCULAR: No chest pain, palpitations, passing out, dizziness, or leg swelling  GASTROINTESTINAL: No abdominal or epigastric pain. No nausea, vomiting, or hematemesis; No diarrhea or constipation. No melena or hematochezia.  GENITOURINARY: No dysuria, frequency, hematuria, or incontinence  NEUROLOGICAL: No headaches, memory loss, loss of strength, numbness, or tremors  SKIN: No itching, burning, rashes, or lesions   LYMPH Nodes: No enlarged glands  ENDOCRINE: No heat or cold intolerance; No hair loss  MUSCULOSKELETAL: No joint pain or swelling; No muscle, back, or extremity pain  PSYCHIATRIC: No depression, anxiety, mood swings, or difficulty sleeping  HEME/LYMPH: No easy bruising, or bleeding gums  ALLERGY AND IMMUNOLOGIC: No hives or eczema	      PHYSICAL EXAM:  T(C): 36.2 (03-17-19 @ 09:00), Max: 36.8 (03-16-19 @ 21:00)  HR: 67 (03-17-19 @ 13:00) (53 - 75)  BP: 146/41 (03-17-19 @ 13:00) (116/33 - 174/60)  RR: 23 (03-17-19 @ 13:00) (11 - 23)  SpO2: 98% (03-17-19 @ 13:00) (92% - 100%)    I&O's Summary    16 Mar 2019 07:01  -  17 Mar 2019 07:00  --------------------------------------------------------  IN: 800 mL / OUT: 1540 mL / NET: -740 mL    17 Mar 2019 07:01  -  17 Mar 2019 15:11  --------------------------------------------------------  IN: 860 mL / OUT: 560 mL / NET: 300 mL        Appearance: Normal	  HEENT:   Normal oral mucosa, PERRL, EOMI	  Lymphatic: No lymphadenopathy  Cardiovascular: Normal S1 S2, No JVD, No murmurs, No edema  Respiratory: Lungs clear to auscultation	  Psychiatry: A & O x 3, Mood & affect appropriate  Gastrointestinal:  Soft, Non-tender, + BS	  Skin: No rashes, No ecchymoses, No cyanosis	  Neurologic: Non-focal  Extremities: Normal range of motion, No clubbing, cyanosis or edema  Vascular: Peripheral pulses palpable 2+ bilaterally    MEDICATIONS  (STANDING):  amLODIPine   Tablet 10 milliGRAM(s) Oral daily  calcium acetate 667 milliGRAM(s) Oral four times a day with meals  chlorhexidine 4% Liquid 1 Application(s) Topical every 12 hours  ergocalciferol 07174 Unit(s) Oral <User Schedule>  furosemide   Injectable 40 milliGRAM(s) IV Push every 8 hours  heparin  Injectable 5000 Unit(s) SubCutaneous every 12 hours  hydrALAZINE 25 milliGRAM(s) Oral three times a day  pantoprazole    Tablet 40 milliGRAM(s) Oral before breakfast      LABS:	 	                       9.0    4.02  )-----------( 160      ( 17 Mar 2019 06:07 )             30.1     03-17    144  |  112<H>  |  93<H>  ----------------------------<  82  4.8   |  26  |  2.15<H>    Ca    8.2<L>      17 Mar 2019 06:07  Phos  5.1     03-17  Mg     2.4     03-17    TPro  6.4  /  Alb  2.6<L>  /  TBili  0.2  /  DBili  x   /  AST  15  /  ALT  35  /  AlkPhos  66  03-17    proBNP: Serum Pro-Brain Natriuretic Peptide: 8592 pg/mL (03-13 @ 13:16)    Lipid Profile: Cholesterol 146  LDL 38    TG 25    HgA1c: Hemoglobin A1C, Whole Blood: 4.8 % (03-14 @ 10:39)    TSH: Thyroid Stimulating Hormone, Serum: 3.74 uU/mL (03-14 @ 06:39)

## 2019-03-17 NOTE — PROGRESS NOTE ADULT - SUBJECTIVE AND OBJECTIVE BOX
Patient is a 89y old  Female who presents with a chief complaint of shortness of breath (17 Mar 2019 11:28)    Pt in NAD, lying in bed, awake, alert. No cough. No SOB.    INTERVAL HPI/OVERNIGHT EVENTS:      VITAL SIGNS:  T(F): 97.2 (03-17-19 @ 09:00)  HR: 67 (03-17-19 @ 13:00)  BP: 146/41 (03-17-19 @ 13:00)  RR: 23 (03-17-19 @ 13:00)  SpO2: 98% (03-17-19 @ 13:00)  Wt(kg): --  I&O's Detail    16 Mar 2019 07:01  -  17 Mar 2019 07:00  --------------------------------------------------------  IN:    Oral Fluid: 800 mL  Total IN: 800 mL    OUT:    Indwelling Catheter - Urethral: 1540 mL  Total OUT: 1540 mL    Total NET: -740 mL      17 Mar 2019 07:01  -  17 Mar 2019 13:22  --------------------------------------------------------  IN:    Oral Fluid: 860 mL  Total IN: 860 mL    OUT:    Indwelling Catheter - Urethral: 435 mL  Total OUT: 435 mL    Total NET: 425 mL              REVIEW OF SYSTEMS:    CONSTITUTIONAL:  No fevers, chills, sweats    HEENT:  Eyes:  No diplopia or blurred vision. ENT:  No earache, sore throat or runny nose.    CARDIOVASCULAR:  No pressure, squeezing, tightness, or heaviness about the chest; no palpitations.    RESPIRATORY:  Per HPI    GASTROINTESTINAL:  No abdominal pain, nausea, vomiting or diarrhea.    GENITOURINARY:  No dysuria, frequency or urgency.    NEUROLOGIC:  No paresthesias, fasciculations, seizures or weakness.    PSYCHIATRIC:  No disorder of thought or mood.      PHYSICAL EXAM:    Constitutional: Well developed and nourished  Eyes:Perrla  ENMT: normal  Neck:supple  Respiratory: wheezing.    Cardiovascular: S1 S2 regular  Gastrointestinal: Soft, Non tender  Extremities: No edema  Vascular:normal  Neurological:Awake, alert,Ox3  Musculoskeletal:Normal      MEDICATIONS  (STANDING):  amLODIPine   Tablet 10 milliGRAM(s) Oral daily  calcium acetate 667 milliGRAM(s) Oral four times a day with meals  chlorhexidine 4% Liquid 1 Application(s) Topical every 12 hours  ergocalciferol 34652 Unit(s) Oral <User Schedule>  furosemide   Injectable 40 milliGRAM(s) IV Push every 8 hours  heparin  Injectable 5000 Unit(s) SubCutaneous every 12 hours  hydrALAZINE 25 milliGRAM(s) Oral three times a day  pantoprazole    Tablet 40 milliGRAM(s) Oral before breakfast    MEDICATIONS  (PRN):  acetaminophen   Tablet .. 650 milliGRAM(s) Oral every 8 hours PRN Moderate Pain (4 - 6)  ALBUTerol    90 MICROgram(s) HFA Inhaler 2 Puff(s) Inhalation every 6 hours PRN Shortness of Breath and/or Wheezing      Allergies    iodine (Unknown)  meperidine (Rash)  oxycodone (Other; Nausea)    Intolerances        LABS:    Comprehensive Metabolic Panel (03.17.19 @ 06:07)    Potassium, Serum: 4.8 mmol/L                        9.0    4.02  )-----------( 160      ( 17 Mar 2019 06:07 )             30.1     03-17    144  |  112<H>  |  93<H>  ----------------------------<  82  4.8   |  26  |  2.15<H>    Ca    8.2<L>      17 Mar 2019 06:07  Phos  5.1     03-17  Mg     2.4     03-17    TPro  6.4  /  Alb  2.6<L>  /  TBili  0.2  /  DBili  x   /  AST  15  /  ALT  35  /  AlkPhos  66  03-17        ABG - ( 17 Mar 2019 04:40 )   pH, Arterial: 7.28  pH, Blood: x     /  pCO2: 58    /  pO2: 121   / HCO3: 27    / Base Excess: 0.2   /  SaO2: 99                    CAPILLARY BLOOD GLUCOSE        pro-bnp 8592 03-13 @ 13:16     d-dimer --  03-13 @ 13:16      RADIOLOGY & ADDITIONAL TESTS:    CXR:  < from: Xray Chest 1 View- PORTABLE-Routine (03.16.19 @ 11:12) >  Impression: Cardiomegaly with pulmonary vascular congestion and bilateral   pleuraleffusions      < end of copied text >    Ct scan chest:    ekg;    echo: Patient is a 89y old  Female who presents with a chief complaint of shortness of breath (17 Mar 2019 11:28)    Pt is Awake, alert, comfortable in bed in NAD. Clinically improved. Remains on oxygen supp via NC.    INTERVAL HPI/OVERNIGHT EVENTS:      VITAL SIGNS:  T(F): 97.2 (03-17-19 @ 09:00)  HR: 67 (03-17-19 @ 13:00)  BP: 146/41 (03-17-19 @ 13:00)  RR: 23 (03-17-19 @ 13:00)  SpO2: 98% (03-17-19 @ 13:00)  Wt(kg): --  I&O's Detail    16 Mar 2019 07:01  -  17 Mar 2019 07:00  --------------------------------------------------------  IN:    Oral Fluid: 800 mL  Total IN: 800 mL    OUT:    Indwelling Catheter - Urethral: 1540 mL  Total OUT: 1540 mL    Total NET: -740 mL      17 Mar 2019 07:01  -  17 Mar 2019 13:22  --------------------------------------------------------  IN:    Oral Fluid: 860 mL  Total IN: 860 mL    OUT:    Indwelling Catheter - Urethral: 435 mL  Total OUT: 435 mL    Total NET: 425 mL              REVIEW OF SYSTEMS:    CONSTITUTIONAL:  No fevers, chills, sweats    HEENT:  Eyes:  No diplopia or blurred vision. ENT:  No earache, sore throat or runny nose.    CARDIOVASCULAR:  No pressure, squeezing, tightness, or heaviness about the chest; no palpitations.    RESPIRATORY:  Per HPI    GASTROINTESTINAL:  No abdominal pain, nausea, vomiting or diarrhea.    GENITOURINARY:  No dysuria, frequency or urgency.    NEUROLOGIC:  No paresthesias, fasciculations, seizures or weakness.    PSYCHIATRIC:  No disorder of thought or mood.      PHYSICAL EXAM:    Constitutional: Well developed and nourished  Eyes:Perrla  ENMT: normal  Neck:supple  Respiratory: wheezing +    Cardiovascular: S1 S2 regular  Gastrointestinal: Soft, Non tender  Extremities: + edema  Vascular:normal  Neurological:Awake, alert,Ox3  Musculoskeletal:Normal      MEDICATIONS  (STANDING):  amLODIPine   Tablet 10 milliGRAM(s) Oral daily  calcium acetate 667 milliGRAM(s) Oral four times a day with meals  chlorhexidine 4% Liquid 1 Application(s) Topical every 12 hours  ergocalciferol 98812 Unit(s) Oral <User Schedule>  furosemide   Injectable 40 milliGRAM(s) IV Push every 8 hours  heparin  Injectable 5000 Unit(s) SubCutaneous every 12 hours  hydrALAZINE 25 milliGRAM(s) Oral three times a day  pantoprazole    Tablet 40 milliGRAM(s) Oral before breakfast    MEDICATIONS  (PRN):  acetaminophen   Tablet .. 650 milliGRAM(s) Oral every 8 hours PRN Moderate Pain (4 - 6)  ALBUTerol    90 MICROgram(s) HFA Inhaler 2 Puff(s) Inhalation every 6 hours PRN Shortness of Breath and/or Wheezing      Allergies    iodine (Unknown)  meperidine (Rash)  oxycodone (Other; Nausea)    Intolerances        LABS:    Comprehensive Metabolic Panel (03.17.19 @ 06:07)    Potassium, Serum: 4.8 mmol/L                        9.0    4.02  )-----------( 160      ( 17 Mar 2019 06:07 )             30.1     03-17    144  |  112<H>  |  93<H>  ----------------------------<  82  4.8   |  26  |  2.15<H>    Ca    8.2<L>      17 Mar 2019 06:07  Phos  5.1     03-17  Mg     2.4     03-17    TPro  6.4  /  Alb  2.6<L>  /  TBili  0.2  /  DBili  x   /  AST  15  /  ALT  35  /  AlkPhos  66  03-17        ABG - ( 17 Mar 2019 04:40 )   pH, Arterial: 7.28  pH, Blood: x     /  pCO2: 58    /  pO2: 121   / HCO3: 27    / Base Excess: 0.2   /  SaO2: 99                    CAPILLARY BLOOD GLUCOSE        pro-bnp 8592 03-13 @ 13:16     d-dimer --  03-13 @ 13:16      RADIOLOGY & ADDITIONAL TESTS:    CXR:  < from: Xray Chest 1 View- PORTABLE-Routine (03.16.19 @ 11:12) >  Impression: Cardiomegaly with pulmonary vascular congestion and bilateral   pleuraleffusions      < end of copied text >    Ct scan chest:    ekg;    echo:

## 2019-03-17 NOTE — PROGRESS NOTE ADULT - ASSESSMENT
89 y/o F from [facility] w/ PMHx of GERD, HTN, anemia, colitis, rectal bleeding, chronic ankle swelling, presents to ED due to shortness of breath for 2 days admitted for management of hypercapnic resp failure likely secondary to pulmonary edema and placed on NIV BIPAP.     Discussed GOC with daughter Ms. Briscoe over phone [phone], who wants the pt to be FULL CODE.    1) Respiratory failure with hypercapnea:  ABG s/o uncompensated respiratory acidosis which could be secondary to pulmonary edema may have contributed to encephalopathy.  Currently pt is more awake, following verbal commands  Pulmonary exam improving  Continue lasix 40 mg IV BID  Continue BIPAP support  Strict I/Os  Start DASH diet    2) Pulmonary edema  Echo with grade 3 DD, moderate AS, mod to severe TR  Management as above  Cardiology- Dr. Medina     3) DENEEN   PT has BUN/ creat of 77/2.03  could have underlying CKD, unknown baseline.   On phoslo for hyperphosphatemia  Monitor BMP  f/u [facility]  Nephrology- Dr Guerrero    4) HTN  Pt has Hx of HTN, will continue home dose of hydralazine, norvasc and lasix  monitor BP closely    5) Anemia  PT has Hb 7.0 on admission, which improved to 9.3 s/p 1 PRBC  stool occult negative  pt has hx of rectal bleed, but no episodes of melena or hematemesis since admission  macrocytic anemia but Vit b12 and folate levels are WNL  Could have some underlying MDS or MM,  f/u SPEP, UPEP  Monitor CBC    6) need for prophylactic measure  DVT ppx with heparin SQ  GI ppx with protonix for GERD.    7) GOC discussion  Discussed GOC with daughter Ms. Briscoe over phone [phone], who wants the pt to be FULL CODE. 89 y/o F from [facility] w/ PMHx of GERD, HTN, anemia, colitis, rectal bleeding, chronic ankle swelling, presents to ED due to shortness of breath for 2 days admitted for management of hypercapnic resp failure likely secondary to pulmonary edema and placed on NIV BIPAP.     Discussed GOC with daughter Ms. Briscoe over phone [phone], who wants the pt to be FULL CODE.    1) Respiratory failure with hypercapnea:  ABG s/o uncompensated respiratory acidosis which could be secondary to pulmonary edema may have contributed to encephalopathy.  Currently pt is more awake, following verbal commands  Pulmonary exam improving ; CXR improved   But still not negative fluid balance , will inc lasix to 40 q8   Continue BIPAP support prn   Strict I/Os  Start DASH diet    2) Pulmonary edema  Echo with grade 3 DD, moderate AS, mod to severe TR  Management as above  Cardiology- Dr. Medina     3) DENEEN   PT has BUN/ creat of 77/2.03  could have underlying CKD, unknown baseline.   On phoslo for hyperphosphatemia  Monitor BMP  f/u [facility]  Nephrology- Dr Guerrero    4) HTN  Pt has Hx of HTN, will continue home dose of hydralazine, norvasc and lasix  monitor BP closely    5) Anemia  PT has Hb 7.0 on admission, which improved to 9.3 s/p 1 PRBC  stool occult negative  pt has hx of rectal bleed, but no episodes of melena or hematemesis since admission  macrocytic anemia but Vit b12 and folate levels are WNL  Could have some underlying MDS or MM,  f/u SPEP, UPEP  Monitor CBC    6) need for prophylactic measure  DVT ppx with heparin SQ  GI ppx with protonix for GERD.    7) GOC discussion  Discussed GOC with daughter Ms. Briscoe over phone [phone], who wants the pt to be FULL CODE.

## 2019-03-17 NOTE — PROGRESS NOTE ADULT - ASSESSMENT
Patient is seen and examined. Case reviewed with the medical team. Above note is appreciated. Will follow up clinically. Continue DVT prophylaxis. Case discussed with pulmonary and cardiology. Patient with hypercapnic respiratory failure  and CHF exacerbation .Off of BIPAP now. Will follow up chest xray . Patient remains in the ICU. Will follow up as per ICU attending. Have spoken in detail with Bryanna osman.

## 2019-03-17 NOTE — PROGRESS NOTE ADULT - SUBJECTIVE AND OBJECTIVE BOX
CHIEF COMPLAINT:Patient is a 89y old  Female who presents with a chief complaint of shortness of breath (17 Mar 2019 15:10)    	  REVIEW OF SYSTEMS:  CONSTITUTIONAL: No fever, weight loss, or fatigue  EYES: No eye pain, visual disturbances, or discharge  ENMT:  No difficulty hearing, tinnitus, vertigo; No sinus or throat pain  NECK: No pain or stiffness  RESPIRATORY: No cough, wheezing, chills or hemoptysis; No Shortness of Breath  CARDIOVASCULAR: No chest pain, palpitations, passing out, dizziness, or leg swelling  GASTROINTESTINAL: No abdominal or epigastric pain. No nausea, vomiting, or hematemesis; No diarrhea or constipation. No melena or hematochezia.  GENITOURINARY: No dysuria, frequency, hematuria, or incontinence  NEUROLOGICAL: No headaches, memory loss, loss of strength, numbness, or tremors  SKIN: No itching, burning, rashes, or lesions   LYMPH Nodes: No enlarged glands  ENDOCRINE: No heat or cold intolerance; No hair loss  MUSCULOSKELETAL: No joint pain or swelling; No muscle, back, or extremity pain  PSYCHIATRIC: No depression, anxiety, mood swings, or difficulty sleeping  HEME/LYMPH: No easy bruising, or bleeding gums  ALLERY AND IMMUNOLOGIC: No hives or eczema	    [ ] All others negative	  [ ] Unable to obtain    PHYSICAL EXAM:  T(C): 37 (03-17-19 @ 16:17), Max: 37 (03-17-19 @ 16:17)  HR: 66 (03-17-19 @ 21:00) (53 - 75)  BP: 142/43 (03-17-19 @ 21:00) (116/33 - 174/60)  RR: 20 (03-17-19 @ 21:00) (11 - 23)  SpO2: 100% (03-17-19 @ 21:00) (92% - 100%)  Wt(kg): --  I&O's Summary    16 Mar 2019 07:01  -  17 Mar 2019 07:00  --------------------------------------------------------  IN: 800 mL / OUT: 1540 mL / NET: -740 mL    17 Mar 2019 07:01  -  17 Mar 2019 22:11  --------------------------------------------------------  IN: 1310 mL / OUT: 1135 mL / NET: 175 mL        Appearance: Normal	  HEENT:   Normal oral mucosa, PERRL, EOMI	  Lymphatic: No lymphadenopathy  Cardiovascular: Normal S1 S2, No JVD, No murmurs, No edema  Respiratory: Lungs clear to auscultation	  Psychiatry: A & O x 3, Mood & affect appropriate  Gastrointestinal:  Soft, Non-tender, + BS	  Skin: No rashes, No ecchymoses, No cyanosis	  Neurologic: Non-focal  Extremities: Normal range of motion, No clubbing, cyanosis or edema  Vascular: Peripheral pulses palpable 2+ bilaterally    MEDICATIONS  (STANDING):  amLODIPine   Tablet 10 milliGRAM(s) Oral daily  calcium acetate 667 milliGRAM(s) Oral four times a day with meals  chlorhexidine 4% Liquid 1 Application(s) Topical every 12 hours  docusate sodium 100 milliGRAM(s) Oral two times a day  ergocalciferol 65814 Unit(s) Oral <User Schedule>  heparin  Injectable 5000 Unit(s) SubCutaneous every 12 hours  hydrALAZINE 25 milliGRAM(s) Oral three times a day  pantoprazole    Tablet 40 milliGRAM(s) Oral before breakfast  senna 2 Tablet(s) Oral at bedtime      TELEMETRY: 	    ECG:  	  RADIOLOGY:  OTHER: 	  	  CBC Full  -  ( 17 Mar 2019 06:07 )  WBC Count : 4.02 K/uL  Hemoglobin : 9.0 g/dL  Hematocrit : 30.1 %  Platelet Count - Automated : 160 K/uL  Mean Cell Volume : 101.0 fl  Mean Cell Hemoglobin : 30.2 pg  Mean Cell Hemoglobin Concentration : 29.9 gm/dL  Auto Neutrophil # : 2.28 K/uL  Auto Lymphocyte # : 1.25 K/uL  Auto Monocyte # : 0.44 K/uL  Auto Eosinophil # : 0.03 K/uL  Auto Basophil # : 0.01 K/uL  Auto Neutrophil % : 56.9 %  Auto Lymphocyte % : 31.1 %  Auto Monocyte % : 10.9 %  Auto Eosinophil % : 0.7 %  Auto Basophil % : 0.2 %        CARDIAC MARKERS:                              9.0    4.02  )-----------( 160      ( 17 Mar 2019 06:07 )             30.1       03-17    144  |  112<H>  |  93<H>  ----------------------------<  82  4.8   |  26  |  2.15<H>    Ca    8.2<L>      17 Mar 2019 06:07  Phos  5.1     03-17  Mg     2.4     03-17    TPro  6.4  /  Alb  2.6<L>  /  TBili  0.2  /  DBili  x   /  AST  15  /  ALT  35  /  AlkPhos  66  03-17            proBNP: Serum Pro-Brain Natriuretic Peptide: 8592 pg/mL (03-13 @ 13:16)    Lipid Profile: Cholesterol 146  LDL 38    TG 25    HgA1c: Hemoglobin A1C, Whole Blood: 4.8 % (03-14 @ 10:39)    TSH: Thyroid Stimulating Hormone, Serum: 3.74 uU/mL (03-14 @ 06:39)    ABG - ( 17 Mar 2019 04:40 )  pH, Arterial: 7.28  pH, Blood: x     /  pCO2: 58    /  pO2: 121   / HCO3: 27    / Base Excess: 0.2   /  SaO2: 99          < from: Xray Chest 1 View- PORTABLE-Routine (03.17.19 @ 10:57) >  EXAM:  XR CHEST PORTABLE ROUTINE 1V                            PROCEDURE DATE:  03/17/2019          INTERPRETATION:  Clinical history: 89-year-old female, CHF.    2 expiratory/kyphotic views are compared to 3/16/2019 and demonstrate   moderate cardiomegaly in the absence of evidence for pericardial   effusion, unchanged. Mild to moderate pulmonary venous congestion,   improved.    Bilateral haze consistent with large effusions/areas of   atelectasis/possible consolidation, grossly unchanged. Fluid/thickening   at the minor fissure again evident.    Moderate degenerative change in the visualized osseous structures with no   acute findings.    Impression    Mild to moderate pulmonary venous congestion, improved.    Large bilateral pleural effusions/areas of atelectasis/possible   elevation, grossly unchanged    < end of copied text >

## 2019-03-17 NOTE — PROGRESS NOTE ADULT - SUBJECTIVE AND OBJECTIVE BOX
INTERVAL HPI/OVERNIGHT EVENTS:   No overnight events    PRESSORS: [ ] YES [ x] NO      Antimicrobial:    Cardiovascular:  amLODIPine   Tablet 10 milliGRAM(s) Oral daily  furosemide   Injectable 40 milliGRAM(s) IV Push every 12 hours  hydrALAZINE 25 milliGRAM(s) Oral three times a day    Pulmonary:  ALBUTerol    90 MICROgram(s) HFA Inhaler 2 Puff(s) Inhalation every 6 hours PRN    Hematalogic:  heparin  Injectable 5000 Unit(s) SubCutaneous every 12 hours    Other:  acetaminophen   Tablet .. 650 milliGRAM(s) Oral every 8 hours PRN  calcium acetate 667 milliGRAM(s) Oral four times a day with meals  chlorhexidine 4% Liquid 1 Application(s) Topical every 12 hours  ergocalciferol 86524 Unit(s) Oral <User Schedule>  pantoprazole    Tablet 40 milliGRAM(s) Oral before breakfast      Drug Dosing Weight  Height (cm): 152.4 (13 Mar 2019 11:40)  Weight (kg): 58.5 (13 Mar 2019 11:40)  BMI (kg/m2): 25.2 (13 Mar 2019 11:40)  BSA (m2): 1.55 (13 Mar 2019 11:40)    CENTRAL LINE: [ ] YES [x ] NO  LOCATION:     REMOVE: [ ] YES [ ] NO      GUZMAN: [ x] YES [ ] NO      REMOVE:  [ ] YES [ ] NO      A-LINE:  [ ] YES [x ] NO  LOCATION:     REMOVE:  [ ] YES [ ] NO      PMH/Social Hx/Fam Hx -reviewed admission note, no change since admission  PAST MEDICAL & SURGICAL HISTORY:  Rectal bleeding  H/O gastroesophageal reflux (GERD)  Colitis  Anemia  CHF (congestive heart failure)  No significant past surgical history        T(C): 36.7 (03-16-19 @ 16:41), Max: 36.9 (03-16-19 @ 12:00)  HR: 64 (03-17-19 @ 00:04)  BP: 173/48 (03-16-19 @ 23:00)  BP(mean): 83 (03-16-19 @ 23:00)  ABP: --  ABP(mean): --  RR: 19 (03-16-19 @ 23:00)  SpO2: 100% (03-17-19 @ 00:04)  Wt(kg): --    ABG - ( 16 Mar 2019 19:38 )  pH, Arterial: 7.28  pH, Blood: x     /  pCO2: 56    /  pO2: 103   / HCO3: 25    / Base Excess: -1.6  /  SaO2: 98                    03-15 @ 07:01  -  03-16 @ 07:00  --------------------------------------------------------  IN: 1320 mL / OUT: 1870 mL / NET: -550 mL            PHYSICAL EXAM:    GENERAL: NAD on BIPAP  HEAD:  Atraumatic, Normocephalic  ENMT: Moist mucous membranes  NECK:  No JVD; Trachea midline  NERVOUS SYSTEM:  Alert & Oriented X2-3  CHEST/LUNG: Bilateral crackles   HEART: Braducardic   ABDOMEN: Soft, Nontender, Nondistended; Bowel sounds present  EXTREMITIES:  2+ Peripheral Pulses, No clubbing, cyanosis, or edema  SKIN: No rashes or lesions; Good capillary refill      LABS:  CBC Full  -  ( 16 Mar 2019 05:53 )  WBC Count : 2.91 K/uL  Hemoglobin : 8.7 g/dL  Hematocrit : 29.3 %  Platelet Count - Automated : 159 K/uL  Mean Cell Volume : 100.3 fl  Mean Cell Hemoglobin : 29.8 pg  Mean Cell Hemoglobin Concentration : 29.7 gm/dL  Auto Neutrophil # : 1.92 K/uL  Auto Lymphocyte # : 0.79 K/uL  Auto Monocyte # : 0.17 K/uL  Auto Eosinophil # : 0.00 K/uL  Auto Basophil # : 0.00 K/uL  Auto Neutrophil % : 66.0 %  Auto Lymphocyte % : 27.0 %  Auto Monocyte % : 6.0 %  Auto Eosinophil % : 0.0 %  Auto Basophil % : 0.0 %    03-16    143  |  111<H>  |  85<H>  ----------------------------<  102<H>  5.1   |  29  |  2.12<H>    Ca    8.2<L>      16 Mar 2019 20:49  Phos  5.6     03-16  Mg     2.3     03-16    TPro  6.6  /  Alb  2.6<L>  /  TBili  0.2  /  DBili  x   /  AST  23  /  ALT  44  /  AlkPhos  77  03-16            RADIOLOGY & ADDITIONAL STUDIES REVIEWED:      GOALS OF CARE DISCUSSION WITH PATIENT/FAMILY/PROXY/ QUESTIONS WERE ANSWERED TO THE BEST OF MY ABILITIES    CRITICAL CARE TIME SPENT: 35 minutes

## 2019-03-17 NOTE — PROGRESS NOTE ADULT - SUBJECTIVE AND OBJECTIVE BOX
INTERVAL HPI/OVERNIGHT EVENTS:       Antimicrobial:    Cardiovascular:  amLODIPine   Tablet 10 milliGRAM(s) Oral daily  furosemide   Injectable 40 milliGRAM(s) IV Push every 8 hours  hydrALAZINE 25 milliGRAM(s) Oral three times a day    Pulmonary:  ALBUTerol    90 MICROgram(s) HFA Inhaler 2 Puff(s) Inhalation every 6 hours PRN    Hematalogic:  heparin  Injectable 5000 Unit(s) SubCutaneous every 12 hours    Other:  acetaminophen   Tablet .. 650 milliGRAM(s) Oral every 8 hours PRN  calcium acetate 667 milliGRAM(s) Oral four times a day with meals  chlorhexidine 4% Liquid 1 Application(s) Topical every 12 hours  ergocalciferol 40697 Unit(s) Oral <User Schedule>  pantoprazole    Tablet 40 milliGRAM(s) Oral before breakfast      Drug Dosing Weight  Height (cm): 152.4 (13 Mar 2019 11:40)  Weight (kg): 58.5 (13 Mar 2019 11:40)  BMI (kg/m2): 25.2 (13 Mar 2019 11:40)  BSA (m2): 1.55 (13 Mar 2019 11:40)    CENTRAL LINE: [ ] YES [x ] NO  LOCATION:   DATE INSERTED:    GUZMAN: [x ] YES [ ] NO    DATE INSERTED:    A-LINE:  [ ] YES [ x] NO  LOCATION:   DATE INSERTED:    PMH/Social Hx/Fam Hx -reviewed admission note, no change since admission  PAST MEDICAL & SURGICAL HISTORY:  Rectal bleeding  H/O gastroesophageal reflux (GERD)  Colitis  Anemia  CHF (congestive heart failure)  No significant past surgical history      T(C): 36.2 (03-17-19 @ 09:00), Max: 36.9 (03-16-19 @ 12:00)  HR: 62 (03-17-19 @ 11:00)  BP: 136/36 (03-17-19 @ 11:00)  BP(mean): 62 (03-17-19 @ 11:00)  ABP: --  ABP(mean): --  RR: 21 (03-17-19 @ 11:00)  SpO2: 99% (03-17-19 @ 11:00)  Wt(kg): --    ABG - ( 17 Mar 2019 04:40 )  pH, Arterial: 7.28  pH, Blood: x     /  pCO2: 58    /  pO2: 121   / HCO3: 27    / Base Excess: 0.2   /  SaO2: 99    BiPaP        03-16 @ 07:01  -  03-17 @ 07:00  --------------------------------------------------------  IN: 800 mL / OUT: 1540 mL / NET: -740 mL            PHYSICAL EXAM:    GENERAL: No signs of distress, comfortable  HEAD: Atraumatic, Normocephalic  EYES: EOMI, PERRLA  ENMT: No erythema, exudates, or enlargement, Moist mucous membranes  NECK: Supple, normal appearance, No JVD; [  ] central line (if applicable)  CHEST/LUNG: No chest deformity, fair bilateral air entry; No rales, rhonchi, wheezing; crackles  HEART: Regular rate and rhythm; No murmurs, rubs, or gallops;   ABDOMEN: Soft, Nontender, Nondistended; Bowel sounds present  EXTREMITIES:  + Peripheral Pulses, No clubbing, cyanosis, or edema  NERVOUS SYSTEM: awake and respond to simple commands  LYMPH: No lymphadenopathy noted  SKIN: No rashes or lesions; good turgor, warm, dry      LABS:  CBC Full  -  ( 17 Mar 2019 06:07 )  WBC Count : 4.02 K/uL  Hemoglobin : 9.0 g/dL  Hematocrit : 30.1 %  Platelet Count - Automated : 160 K/uL  Mean Cell Volume : 101.0 fl  Mean Cell Hemoglobin : 30.2 pg  Mean Cell Hemoglobin Concentration : 29.9 gm/dL  Auto Neutrophil # : 2.28 K/uL  Auto Lymphocyte # : 1.25 K/uL  Auto Monocyte # : 0.44 K/uL  Auto Eosinophil # : 0.03 K/uL  Auto Basophil # : 0.01 K/uL  Auto Neutrophil % : 56.9 %  Auto Lymphocyte % : 31.1 %  Auto Monocyte % : 10.9 %  Auto Eosinophil % : 0.7 %  Auto Basophil % : 0.2 %    03-17    144  |  112<H>  |  93<H>  ----------------------------<  82  4.8   |  26  |  2.15<H>    Ca    8.2<L>      17 Mar 2019 06:07  Phos  5.1     03-17  Mg     2.4     03-17    TPro  6.4  /  Alb  2.6<L>  /  TBili  0.2  /  DBili  x   /  AST  15  /  ALT  35  /  AlkPhos  66  03-17            RADIOLOGY & ADDITIONAL STUDIES REVIEWED     CXR: pend    IMPRESSION:  PAST MEDICAL & SURGICAL HISTORY:  Rectal bleeding  H/O gastroesophageal reflux (GERD)  Colitis  Anemia  CHF (congestive heart failure)  No significant past surgical history   p/w       IMP: This is a 89 y/o F from [facility] w/ PMHx of GERD, HTN, anemia, colitis, rectal bleeding, chronic ankle swelling, presents to ED due to shortness of breath for 2 days admitted for management of hypercapnic hypoxic resp failure due to pulmonary edema and placed on NIPPV support. PT off bipap today. CXR peding.  Pul, cards and nephro f/u noted.  Blood gas still show resp acidosis despite BiPaP          Plan:      CNS: no sedation    PULMONARY: O2 as needed and resume Bipap, increase [id number]    CARDIAC: monitor    GI: feed    RENAL: keep  neg fluid balance, increase lasix  dose and one dose albumin . Keep - fluid balance    SKIN: as per nursing    MUSCULOSKELETAL: Pt. OOB to chair    ID: no issue    HEME: monitor    DVT and GI Prophylaxis    GOALS OF CARE DISCUSSION WITH PATIENT/FAMILY/PROXY/ icu team on rounds

## 2019-03-17 NOTE — PROGRESS NOTE ADULT - NSICDXPROBLEM_GEN_ALL_CORE_FT
PROBLEM DIAGNOSES  Problem: Hyperkalemia  Assessment and Plan: - K+  4.8 mmol/L now  - Monitor CMP  - Renal F/U       Problem: Renal insufficiency  Assessment and Plan:  - Chronic  - Monitor labs.  - Renal F/U  - Continue meds  - Avoid nephrotoxic drugs.         Problem: Hypercapnic respiratory failure  Assessment and Plan: - Likely 2nd to pulmonary edema.     XR shows increased interstitial lung markings with nonspecific right hilum prominence - unable to r/o mass or lymphadenopathy on XR.   - Currently in ICU   - Off Bi-pap  Oxygen supp  - Bronchodilators  - Steroids  - IV Lasix    - DVT PPX  -Follow up CXR

## 2019-03-18 DIAGNOSIS — J81.0 ACUTE PULMONARY EDEMA: ICD-10-CM

## 2019-03-18 DIAGNOSIS — J96.02 ACUTE RESPIRATORY FAILURE WITH HYPERCAPNIA: ICD-10-CM

## 2019-03-18 DIAGNOSIS — I50.33 ACUTE ON CHRONIC DIASTOLIC (CONGESTIVE) HEART FAILURE: ICD-10-CM

## 2019-03-18 DIAGNOSIS — R53.81 OTHER MALAISE: ICD-10-CM

## 2019-03-18 DIAGNOSIS — Z51.5 ENCOUNTER FOR PALLIATIVE CARE: ICD-10-CM

## 2019-03-18 LAB
ALBUMIN SERPL ELPH-MCNC: 2.6 G/DL — LOW (ref 3.5–5)
ALP SERPL-CCNC: 60 U/L — SIGNIFICANT CHANGE UP (ref 40–120)
ALT FLD-CCNC: 26 U/L DA — SIGNIFICANT CHANGE UP (ref 10–60)
ANION GAP SERPL CALC-SCNC: 5 MMOL/L — SIGNIFICANT CHANGE UP (ref 5–17)
AST SERPL-CCNC: 13 U/L — SIGNIFICANT CHANGE UP (ref 10–40)
BASE EXCESS BLDA CALC-SCNC: 1.8 MMOL/L — SIGNIFICANT CHANGE UP (ref -2–2)
BASOPHILS # BLD AUTO: 0.01 K/UL — SIGNIFICANT CHANGE UP (ref 0–0.2)
BASOPHILS NFR BLD AUTO: 0.2 % — SIGNIFICANT CHANGE UP (ref 0–2)
BILIRUB SERPL-MCNC: 0.2 MG/DL — SIGNIFICANT CHANGE UP (ref 0.2–1.2)
BLOOD GAS COMMENTS ARTERIAL: SIGNIFICANT CHANGE UP
BUN SERPL-MCNC: 90 MG/DL — HIGH (ref 7–18)
CALCIUM SERPL-MCNC: 8.1 MG/DL — LOW (ref 8.4–10.5)
CHLORIDE SERPL-SCNC: 111 MMOL/L — HIGH (ref 96–108)
CO2 SERPL-SCNC: 27 MMOL/L — SIGNIFICANT CHANGE UP (ref 22–31)
CREAT SERPL-MCNC: 1.97 MG/DL — HIGH (ref 0.5–1.3)
EOSINOPHIL # BLD AUTO: 0.09 K/UL — SIGNIFICANT CHANGE UP (ref 0–0.5)
EOSINOPHIL NFR BLD AUTO: 2.1 % — SIGNIFICANT CHANGE UP (ref 0–6)
GLUCOSE SERPL-MCNC: 77 MG/DL — SIGNIFICANT CHANGE UP (ref 70–99)
HCO3 BLDA-SCNC: 28 MMOL/L — HIGH (ref 23–27)
HCT VFR BLD CALC: 29.7 % — LOW (ref 34.5–45)
HGB BLD-MCNC: 8.9 G/DL — LOW (ref 11.5–15.5)
HOROWITZ INDEX BLDA+IHG-RTO: 28 — SIGNIFICANT CHANGE UP
IMM GRANULOCYTES NFR BLD AUTO: 0.2 % — SIGNIFICANT CHANGE UP (ref 0–1.5)
LYMPHOCYTES # BLD AUTO: 1.24 K/UL — SIGNIFICANT CHANGE UP (ref 1–3.3)
LYMPHOCYTES # BLD AUTO: 28.6 % — SIGNIFICANT CHANGE UP (ref 13–44)
MAGNESIUM SERPL-MCNC: 2.2 MG/DL — SIGNIFICANT CHANGE UP (ref 1.6–2.6)
MCHC RBC-ENTMCNC: 30 GM/DL — LOW (ref 32–36)
MCHC RBC-ENTMCNC: 30.4 PG — SIGNIFICANT CHANGE UP (ref 27–34)
MCV RBC AUTO: 101.4 FL — HIGH (ref 80–100)
MONOCYTES # BLD AUTO: 0.57 K/UL — SIGNIFICANT CHANGE UP (ref 0–0.9)
MONOCYTES NFR BLD AUTO: 13.1 % — SIGNIFICANT CHANGE UP (ref 2–14)
NEUTROPHILS # BLD AUTO: 2.42 K/UL — SIGNIFICANT CHANGE UP (ref 1.8–7.4)
NEUTROPHILS NFR BLD AUTO: 55.8 % — SIGNIFICANT CHANGE UP (ref 43–77)
NRBC # BLD: 0 /100 WBCS — SIGNIFICANT CHANGE UP (ref 0–0)
NT-PROBNP SERPL-SCNC: HIGH PG/ML (ref 0–450)
PCO2 BLDA: 55 MMHG — HIGH (ref 32–46)
PH BLDA: 7.33 — LOW (ref 7.35–7.45)
PHOSPHATE SERPL-MCNC: 4.7 MG/DL — HIGH (ref 2.5–4.5)
PLATELET # BLD AUTO: 154 K/UL — SIGNIFICANT CHANGE UP (ref 150–400)
PO2 BLDA: 92 MMHG — SIGNIFICANT CHANGE UP (ref 74–108)
POTASSIUM SERPL-MCNC: 4.6 MMOL/L — SIGNIFICANT CHANGE UP (ref 3.5–5.3)
POTASSIUM SERPL-SCNC: 4.6 MMOL/L — SIGNIFICANT CHANGE UP (ref 3.5–5.3)
PROT SERPL-MCNC: 6.2 G/DL — SIGNIFICANT CHANGE UP (ref 6–8.3)
RBC # BLD: 2.93 M/UL — LOW (ref 3.8–5.2)
RBC # FLD: 15.9 % — HIGH (ref 10.3–14.5)
SAO2 % BLDA: 97 % — HIGH (ref 92–96)
SODIUM SERPL-SCNC: 143 MMOL/L — SIGNIFICANT CHANGE UP (ref 135–145)
WBC # BLD: 4.34 K/UL — SIGNIFICANT CHANGE UP (ref 3.8–10.5)
WBC # FLD AUTO: 4.34 K/UL — SIGNIFICANT CHANGE UP (ref 3.8–10.5)

## 2019-03-18 PROCEDURE — 71045 X-RAY EXAM CHEST 1 VIEW: CPT | Mod: 26

## 2019-03-18 RX ORDER — FUROSEMIDE 40 MG
40 TABLET ORAL EVERY 12 HOURS
Qty: 0 | Refills: 0 | Status: DISCONTINUED | OUTPATIENT
Start: 2019-03-18 | End: 2019-03-25

## 2019-03-18 RX ORDER — NYSTATIN CREAM 100000 [USP'U]/G
1 CREAM TOPICAL EVERY 12 HOURS
Qty: 0 | Refills: 0 | Status: DISCONTINUED | OUTPATIENT
Start: 2019-03-18 | End: 2019-03-26

## 2019-03-18 RX ADMIN — SENNA PLUS 2 TABLET(S): 8.6 TABLET ORAL at 21:12

## 2019-03-18 RX ADMIN — HEPARIN SODIUM 5000 UNIT(S): 5000 INJECTION INTRAVENOUS; SUBCUTANEOUS at 06:02

## 2019-03-18 RX ADMIN — Medication 667 MILLIGRAM(S): at 21:12

## 2019-03-18 RX ADMIN — Medication 25 MILLIGRAM(S): at 21:12

## 2019-03-18 RX ADMIN — CHLORHEXIDINE GLUCONATE 1 APPLICATION(S): 213 SOLUTION TOPICAL at 17:50

## 2019-03-18 RX ADMIN — NYSTATIN CREAM 1 APPLICATION(S): 100000 CREAM TOPICAL at 17:50

## 2019-03-18 RX ADMIN — Medication 667 MILLIGRAM(S): at 11:42

## 2019-03-18 RX ADMIN — Medication 25 MILLIGRAM(S): at 13:18

## 2019-03-18 RX ADMIN — Medication 25 MILLIGRAM(S): at 06:02

## 2019-03-18 RX ADMIN — Medication 650 MILLIGRAM(S): at 21:13

## 2019-03-18 RX ADMIN — PANTOPRAZOLE SODIUM 40 MILLIGRAM(S): 20 TABLET, DELAYED RELEASE ORAL at 06:02

## 2019-03-18 RX ADMIN — Medication 100 MILLIGRAM(S): at 17:50

## 2019-03-18 RX ADMIN — Medication 100 MILLIGRAM(S): at 06:02

## 2019-03-18 RX ADMIN — Medication 667 MILLIGRAM(S): at 08:40

## 2019-03-18 RX ADMIN — AMLODIPINE BESYLATE 10 MILLIGRAM(S): 2.5 TABLET ORAL at 06:02

## 2019-03-18 RX ADMIN — CHLORHEXIDINE GLUCONATE 1 APPLICATION(S): 213 SOLUTION TOPICAL at 06:02

## 2019-03-18 RX ADMIN — Medication 40 MILLIGRAM(S): at 17:50

## 2019-03-18 RX ADMIN — Medication 40 MILLIGRAM(S): at 13:07

## 2019-03-18 RX ADMIN — Medication 667 MILLIGRAM(S): at 17:50

## 2019-03-18 RX ADMIN — HEPARIN SODIUM 5000 UNIT(S): 5000 INJECTION INTRAVENOUS; SUBCUTANEOUS at 17:50

## 2019-03-18 RX ADMIN — Medication 650 MILLIGRAM(S): at 22:00

## 2019-03-18 NOTE — CONSULT NOTE ADULT - SUBJECTIVE AND OBJECTIVE BOX
HPI:  89 y/o F from [facility] w/ PMHx of GERD, anemia, colitis, rectal bleeding, chronic ankle swelling, presents to ED due to shortness of breath for 2 days. Pt reports she is treated every 4 hours with albuterol nebulizers in the past 2 days, and that shortness of breath started 1 month ago once she moved to [facility]. Pt also complaints of bilateral leg swelling worsening over the past 1 month. Per daughter, patient has had hx of anemia, with colonoscopy 8 years ago, however daughter does not remember results, and does not recall that patient was continued on any iron supplements.  Patient had had an episode of bright red blood per rectum several months ago, however denying any current episode, denying dark tarry stools, nausea, vomiting, diarrhea, all others ROS negaitve.  Pt on lasix for chornic bilateral ankle swelling, however unclear if she has hx of CHF as daughter does not know. Pt able to ambulate with walker. Daughter states patient PCP prior to [facility] is Dr. Aleman who would know further information, contact numbers listed above. (13 Mar 2019 18:43).    Patient seen in ICU - on nasal canula, alert and oriented; request to establish goals of care. Full code on file.       PAST MEDICAL & SURGICAL HISTORY:  Rectal bleeding  H/O gastroesophageal reflux (GERD)  Colitis  Anemia  CHF (congestive heart failure)  No significant past surgical history      SOCIAL HISTORY:    Admitted from:  [facility]    Substance abuse history:  none            Tobacco hx: history - unable quantify                 Alcohol hx:   previous social use - scotch and soda              Home Opioid hx: none  [employer]:  [employer]                                  Preferred Language: English    Surrogate/HCP/Guardian:   Bryanna Youngblood (daughter/surrogate): [phone]    FAMILY HISTORY: No significant family hx per patient report.  No pertinent family history in first degree relatives    Baseline ADLs (prior to admission): ambulatory with walker, alert and oriented    Allergies:  iodine (Unknown)  meperidine (Rash)  oxycodone (Other; Nausea)    Present Symptoms:   Dyspnea: none  Nausea/Vomiting:  none  Pain:    none        Review of Systems: Patient with no complaints at time of exam.      MEDICATIONS  (STANDING):  amLODIPine   Tablet 10 milliGRAM(s) Oral daily  calcium acetate 667 milliGRAM(s) Oral four times a day with meals  chlorhexidine 4% Liquid 1 Application(s) Topical every 12 hours  docusate sodium 100 milliGRAM(s) Oral two times a day  furosemide   Injectable 40 milliGRAM(s) IV Push every 12 hours  heparin  Injectable 5000 Unit(s) SubCutaneous every 12 hours  hydrALAZINE 25 milliGRAM(s) Oral three times a day  nystatin Powder 1 Application(s) Topical every 12 hours  pantoprazole    Tablet 40 milliGRAM(s) Oral before breakfast  senna 2 Tablet(s) Oral at bedtime    MEDICATIONS  (PRN):  acetaminophen   Tablet .. 650 milliGRAM(s) Oral every 8 hours PRN Moderate Pain (4 - 6)  ALBUTerol    90 MICROgram(s) HFA Inhaler 2 Puff(s) Inhalation every 6 hours PRN Shortness of Breath and/or Wheezing  artificial  tears Solution 1 Drop(s) Both EYES every 6 hours PRN Dry Eyes      PHYSICAL EXAM:    Vital Signs Last 24 Hrs  T(C): 36.4 (18 Mar 2019 15:00), Max: 36.6 (17 Mar 2019 23:00)  T(F): 97.5 (18 Mar 2019 15:00), Max: 97.8 (17 Mar 2019 23:00)  HR: 68 (18 Mar 2019 16:00) (59 - 79)  BP: 164/53 (18 Mar 2019 16:00) (137/48 - 167/51)  BP(mean): 80 (18 Mar 2019 15:00) (67 - 84)  RR: 20 (18 Mar 2019 16:00) (11 - 24)  SpO2: 100% (18 Mar 2019 16:00) (94% - 100%)    General: alert  oriented x 3, verbal, follows commands. No signs of distress    Karnofsky Performance Score/Palliative Performance Status Version2:     30%    HEENT: normal     Lungs: comfortable, on nasal canula  CV: normal    GI: normal   :  villalobos  Musculoskeletal: ambulatory with walker at baseline; +2 bilateral  strength, assist with ADLs  Skin: trace bilateral edema  Neuro: patient alert and oriented x 3, verbal, follows commands   Diet: mechanical soft    LABS:                        8.9    4.34  )-----------( 154      ( 18 Mar 2019 06:40 )             29.7     03-18    143  |  111<H>  |  90<H>  ----------------------------<  77  4.6   |  27  |  1.97<H>    Ca    8.1<L>      18 Mar 2019 06:40  Phos  4.7     03-18  Mg     2.2     03-18    TPro  6.2  /  Alb  2.6<L>  /  TBili  0.2  /  DBili  x   /  AST  13  /  ALT  26  /  AlkPhos  60  03-18    Albumin: 2.6    RADIOLOGY & ADDITIONAL STUDIES:  < from: Xray Chest 1 View- PORTABLE-Routine (03.18.19 @ 10:27) >    EXAM:  XR CHEST PORTABLE ROUTINE 1V                            PROCEDURE DATE:  03/18/2019          INTERPRETATION:  INDICATION: CHF; follow-up assessment.    PRIORS: 3/17/2019 at 9:15 AM    VIEWS: Portable AP radiography of the chest performed.    FINDINGS: Since prior evaluation, no significant interval change is   identified. Heart size cannot quantitated on this portable evaluation. No   superior mediastinal widening is identified. There are patchy airspace   opacities identified bilaterally with bilateral pleural effusions. There   is no evidence for pneumothorax. No mediastinal shift is noted.   Degenerative change of the thoracic spine noted.    IMPRESSION: Findings consistent with continued CHF. Clinical correlation   and follow-up suggested.      < end of copied text >    < from: Transthoracic Echocardiogram (03.14.19 @ 07:38) >  Patient name: DEEDEE YOUNGBLOOD  YOB: 1924   Age: 89 (F)   MR#: [id number]  Study Date: 3/14/2019  Location: [place] [id number]: Edward Nation RDCS  Study quality: Fair  Referring Physician:  BETH DYER MD  Blood Pressure: 175/55 mmHg  Height: 152 cm  Weight: 58 kg  BSA: 1.5 m2  ------------------------------------------------------------------------    PROCEDURE: Transthoracic echocardiogram with 2-D, M-Mode  and complete spectral and color flow Doppler.  INDICATION: Dyspnea, unspecified (R06.00)  HISTORY:  ------------------------------------------------------------------------  DIMENSIONS:  Dimensions:     Normal Values:  LA:     4.5 cm    2.0 - 4.0 cm  Ao:     2.7 cm    2.0 - 3.8 cm  SEPTUM: 1.3 cm    0.6 - 1.2 cm  PWT:  1.3 cm    0.6 - 1.1 cm  LVIDd:  4.2 cm    3.0 - 5.6 cm  LVIDs:  2.3 cm    1.8 - 4.0 cm      Derived Variables:  LVMI: 130 g/m2  RWT: 0.61  Ejection Fraction Visual Estimate: >70 %  Peak Velocity (m/sec): AoV=2.9  ------------------------------------------------------------------------  OBSERVATIONS:  Mitral Valve: Mild posterior mitral annular calcification.  Mild to moderate mitral regurgitation.  Aortic Root: Normal aortic root.  Aortic Valve: Calcified, probably trileaflet aortic valve  withdecreased opening. Peak transaortic valve gradient  equals 35.1 mm Hg, mean transaortic valve gradient equals  17 mm Hg, estimated aortic valve area equals 1.3 sqcm (by  continuity equation), consistent with moderate aortic  stenosis. Mild aortic regurgitation.  Left Atrium: Severely dilated left atrium.  LA volume index  = 73 cc/m2.  Left Ventricle: Hyperdynamic left ventricular systolic  function (EF >70%). No regional wall motion abnormalities.  Severe concentric left ventricular hypertrophy. Grade III  diastolic dysfunction.  Right Heart: Mild right atrial enlargement. Normal right  ventricular size and systolic function (TAPSE 2.6 cm).  Normal tricuspid valve. Moderate to severe tricuspid  regurgitation. Pulmonic valve not well seen. Trace pulmonic  insufficiency is noted.  Pericardium/PleuraTrivial pericardial effusion is seen.  Left pleural effusion.  Hemodynamic: RA Pressure is 8 mm Hg. RV systolic pressure  is moderately increased at  49 mm Hg.  ------------------------------------------------------------------------  CONCLUSIONS:  1. Mild posterior mitral annular calcification. Mild to  moderate mitral regurgitation.  2. Calcified, probably trileaflet aortic valve with  decreased opening. Moderate aortic stenosis. Mild aortic  regurgitation.  3. Normal aortic root.  4. Severely dilated left atrium.  LA volume index = 73  cc/m2.  5. Severe concentric left ventricular hypertrophy.  6. Hyperdynamic left ventricular systolic function (EF  >70%).  7. Grade III diastolic dysfunction.  8. Mild right atrial enlargement.  9. Normal right ventricular size and systolic function  (TAPSE 2.6 cm).  10. RV systolic pressure is moderately increased at  49 mm  Hg.  11. Normal tricuspid valve. Moderate to severe tricuspid  regurgitation.  12. Pulmonic valve not well seen. Trace pulmonic  insufficiency is noted.  13. Trivial pericardial effusion is seen.  14. Left pleural effusion.    *** No previous Echo exam.  ------------------------------------------------------------------------    < end of copied text >      ADVANCE DIRECTIVES: MOLST completed as per patient's wishes: DNR / trial intubation / trial feeding tube / send to hospital / trial IV fluids / use abx.   Advanced Care Planning discussion total time spent:  +30 minutes. Met with patient at bedside; Dr. Lopez present. Discussed status and risks vs benefits of resuscitation, intubation, artificial nutrition. Patient reports, "No sense in prolonging it. Death comes for everyone." Patient reports she would prefer a "natural, comfortable death." Reviewed MOLST and completed as per patient's wishes. Patient was a full code; now requests: DNR / trial intubation / send to hospital / trial feeding tube / trial IV fluids / use abx. Spoke with patient's daughter/surrogate, Bryanna, on the phone - she is agreeable with patient's wishes. All questions answered; supportive counseling provided.

## 2019-03-18 NOTE — PROGRESS NOTE ADULT - SUBJECTIVE AND OBJECTIVE BOX
Problem List:  History of CKD.  DENEEN on admission associated with CHF and COPD exacerbation.  Severe diastolic dysfunction,  LAE and severe LVH.    PAST MEDICAL & SURGICAL HISTORY:  Rectal bleeding  H/O gastroesophageal reflux (GERD)  Colitis  Anemia  CHF (congestive heart failure)  No significant past surgical history      iodine (Unknown)  meperidine (Rash)  oxycodone (Other; Nausea)      MEDICATIONS  (STANDING):  amLODIPine   Tablet 10 milliGRAM(s) Oral daily  calcium acetate 667 milliGRAM(s) Oral four times a day with meals  chlorhexidine 4% Liquid 1 Application(s) Topical every 12 hours  docusate sodium 100 milliGRAM(s) Oral two times a day  heparin  Injectable 5000 Unit(s) SubCutaneous every 12 hours  hydrALAZINE 25 milliGRAM(s) Oral three times a day  pantoprazole    Tablet 40 milliGRAM(s) Oral before breakfast  senna 2 Tablet(s) Oral at bedtime    MEDICATIONS  (PRN):  acetaminophen   Tablet .. 650 milliGRAM(s) Oral every 8 hours PRN Moderate Pain (4 - 6)  ALBUTerol    90 MICROgram(s) HFA Inhaler 2 Puff(s) Inhalation every 6 hours PRN Shortness of Breath and/or Wheezing                            8.9    4.34  )-----------( 154      ( 18 Mar 2019 06:40 )             29.7     03-18    143  |  111<H>  |  90<H>  ----------------------------<  77  4.6   |  27  |  1.97<H>    Ca    8.1<L>      18 Mar 2019 06:40  Phos  4.7     03-18  Mg     2.2     03-18    TPro  6.2  /  Alb  2.6<L>  /  TBili  0.2  /  DBili  x   /  AST  13  /  ALT  26  /  AlkPhos  60  03-18        Potassium, Random Urine: 20 mmol/L (03-15 @ 15:31)  Creatinine, Random Urine: 31 mg/dL (03-15 @ 15:31)  Osmolality, Random Urine: 350 mos/kg (03-14 @ 13:45)  Chloride, Random Urine: 90 mmol/L (03-14 @ 13:43)  Sodium, Random Urine: 77 mmol/L (03-14 @ 13:43)  Creatinine, Random Urine: 46 mg/dL (03-14 @ 13:43)      REVIEW OF SYSTEMS:    RESPIRATORY: Feels better today No shortness of breath  CARDIOVASCULAR: No chest pain or palpitations. No Edema  GASTROINTESTINAL: No abdominal or epigastric pain. No nausea, vomiting. No diarrhea or constipation. No melena.  GENITOURINARY: No dysuria, frequency, foamy urine, urinary urgency, incontinence or hematuria  NEUROLOGICAL: No numbness or weakness, no tremor , no dizziness.   Muscle skeletal : no joint pain and no swelling of joints and limbs.      VITALS:  T(F): 97.8 (03-18-19 @ 05:00), Max: 98.6 (03-17-19 @ 16:17)  HR: 66 (03-18-19 @ 08:40)  BP: 157/47 (03-18-19 @ 07:00)  RR: 19 (03-18-19 @ 07:00)  SpO2: 94% (03-18-19 @ 08:40)  Wt(kg): --    03-17 @ 07:01  -  03-18 @ 07:00  --------------------------------------------------------  IN: 1560 mL / OUT: 2535 mL / NET: -975 mL        PHYSICAL EXAM:    Neck: No JVD, no carotid bruit.  Respiratory: Good air entrance B/L, no wheezes, rales or rhonchi  Cardiovascular: S1, S2, RRR, no pericardial rub, no murmur  Abdomen: BS+, soft, no tenderness, no bruit  Pelvis: bladder nondistended  Extremities: No cyanosis or clubbing. No peripheral edema.

## 2019-03-18 NOTE — CONSULT NOTE ADULT - NSICDXPROBLEM_GEN_ALL_CORE_FT
PROBLEM DIAGNOSES  Problem: Acute hypercapnic respiratory failure  Recommendation: Likely to dHF/pulmonary edema. Patient currently on nasal canula; bipap at night. CXR improved. Continues lasix. Pulmonology and Cardio following. Patient was a full code, now requests DNR.        Problem: Pulmonary edema, acute  Recommendation: Likely 2/2 G3DD; per echo: EF 70%, G3DD, moderate AS, mod to severe TR. Continues IV lasix, norasc, hydralazine, albuterol. Patient on nasal canula, with no complaints at time of exam. Pulmonary and Cardio following. DNR.     Problem: Debility  Recommendation: Patient alert/oriented and ambulatory with walker at baseline. From [facility]. Patient noted with + weakness, +2 bilateral  strength; requiring assistance with ADLs now. PT recommending KELLEY.     Problem: Palliative care encounter  Recommendation: Met with patient at bedside; Dr. Lopez present. Discussed status and risks vs benefits of resuscitation, intubation, artificial nutrition. Patient reports, "No sense in prolonging it. Death comes for everyone." Patient reports she would prefer a "natural, comfortable death." Reviewed MOLST and completed as per patient's wishes. Patient was a full code; now requests: DNR / trial intubation / send to hospital / trial feeding tube / trial IV fluids / use abx. Spoke with patient's daughter/surrogate, Bryanna, on the phone - she is agreeable with patient's wishes. Recommend completion of HCP prior to discharge. All questions answered; supportive counseling provided. DNR/MOLST completed as per patient's wishes.

## 2019-03-18 NOTE — PROGRESS NOTE ADULT - SUBJECTIVE AND OBJECTIVE BOX
CHIEF COMPLAINT:Patient is a 89y old  Female who presents with a chief complaint of shortness of breath (18 Mar 2019 16:25)    	  REVIEW OF SYSTEMS:  CONSTITUTIONAL: No fever, weight loss, or fatigue  EYES: No eye pain, visual disturbances, or discharge  ENMT:  No difficulty hearing, tinnitus, vertigo; No sinus or throat pain  NECK: No pain or stiffness  RESPIRATORY: No cough, wheezing, chills or hemoptysis; No Shortness of Breath  CARDIOVASCULAR: No chest pain, palpitations, passing out, dizziness, or leg swelling  GASTROINTESTINAL: No abdominal or epigastric pain. No nausea, vomiting, or hematemesis; No diarrhea or constipation. No melena or hematochezia.  GENITOURINARY: No dysuria, frequency, hematuria, or incontinence  NEUROLOGICAL: No headaches, memory loss, loss of strength, numbness, or tremors  SKIN: No itching, burning, rashes, or lesions   LYMPH Nodes: No enlarged glands  ENDOCRINE: No heat or cold intolerance; No hair loss  MUSCULOSKELETAL: No joint pain or swelling; No muscle, back, or extremity pain  PSYCHIATRIC: No depression, anxiety, mood swings, or difficulty sleeping  HEME/LYMPH: No easy bruising, or bleeding gums  ALLERY AND IMMUNOLOGIC: No hives or eczema	    [ ] All others negative	  [ ] Unable to obtain    PHYSICAL EXAM:  T(C): 36.9 (03-18-19 @ 16:36), Max: 36.9 (03-18-19 @ 16:36)  HR: 62 (03-18-19 @ 16:36) (59 - 79)  BP: 164/48 (03-18-19 @ 16:36) (137/48 - 167/51)  RR: 16 (03-18-19 @ 16:36) (11 - 24)  SpO2: 100% (03-18-19 @ 16:36) (94% - 100%)  Wt(kg): --  I&O's Summary    17 Mar 2019 07:01  -  18 Mar 2019 07:00  --------------------------------------------------------  IN: 1560 mL / OUT: 2535 mL / NET: -975 mL    18 Mar 2019 07:01  -  18 Mar 2019 19:04  --------------------------------------------------------  IN: 300 mL / OUT: 1000 mL / NET: -700 mL        Appearance: Normal	  HEENT:   Normal oral mucosa, PERRL, EOMI	  Lymphatic: No lymphadenopathy  Cardiovascular: Normal S1 S2, No JVD, No murmurs, No edema  Respiratory: Lungs clear to auscultation	  Psychiatry: A & O x 3, Mood & affect appropriate  Gastrointestinal:  Soft, Non-tender, + BS	  Skin: No rashes, No ecchymoses, No cyanosis	  Neurologic: Non-focal  Extremities: Normal range of motion, No clubbing, cyanosis or edema  Vascular: Peripheral pulses palpable 2+ bilaterally    MEDICATIONS  (STANDING):  amLODIPine   Tablet 10 milliGRAM(s) Oral daily  calcium acetate 667 milliGRAM(s) Oral four times a day with meals  chlorhexidine 4% Liquid 1 Application(s) Topical every 12 hours  docusate sodium 100 milliGRAM(s) Oral two times a day  furosemide   Injectable 40 milliGRAM(s) IV Push every 12 hours  heparin  Injectable 5000 Unit(s) SubCutaneous every 12 hours  hydrALAZINE 25 milliGRAM(s) Oral three times a day  nystatin Powder 1 Application(s) Topical every 12 hours  pantoprazole    Tablet 40 milliGRAM(s) Oral before breakfast  senna 2 Tablet(s) Oral at bedtime      TELEMETRY: 	    ECG:  	  RADIOLOGY:  OTHER: 	  	  CBC Full  -  ( 18 Mar 2019 06:40 )  WBC Count : 4.34 K/uL  Hemoglobin : 8.9 g/dL  Hematocrit : 29.7 %  Platelet Count - Automated : 154 K/uL  Mean Cell Volume : 101.4 fl  Mean Cell Hemoglobin : 30.4 pg  Mean Cell Hemoglobin Concentration : 30.0 gm/dL  Auto Neutrophil # : 2.42 K/uL  Auto Lymphocyte # : 1.24 K/uL  Auto Monocyte # : 0.57 K/uL  Auto Eosinophil # : 0.09 K/uL  Auto Basophil # : 0.01 K/uL  Auto Neutrophil % : 55.8 %  Auto Lymphocyte % : 28.6 %  Auto Monocyte % : 13.1 %  Auto Eosinophil % : 2.1 %  Auto Basophil % : 0.2 %        CARDIAC MARKERS:                              8.9    4.34  )-----------( 154      ( 18 Mar 2019 06:40 )             29.7       03-18    143  |  111<H>  |  90<H>  ----------------------------<  77  4.6   |  27  |  1.97<H>    Ca    8.1<L>      18 Mar 2019 06:40  Phos  4.7     03-18  Mg     2.2     03-18    TPro  6.2  /  Alb  2.6<L>  /  TBili  0.2  /  DBili  x   /  AST  13  /  ALT  26  /  AlkPhos  60  03-18            proBNP: Serum Pro-Brain Natriuretic Peptide: 74907 pg/mL (03-18 @ 06:40)  Serum Pro-Brain Natriuretic Peptide: 8592 pg/mL (03-13 @ 13:16)    Lipid Profile: Cholesterol 146  LDL 38    TG 25    HgA1c: Hemoglobin A1C, Whole Blood: 4.8 % (03-14 @ 10:39)    TSH: Thyroid Stimulating Hormone, Serum: 3.74 uU/mL (03-14 @ 06:39)    ABG - ( 18 Mar 2019 08:24 )  pH, Arterial: 7.33  pH, Blood: x     /  pCO2: 55    /  pO2: 92    / HCO3: 28    / Base Excess: 1.8   /  SaO2: 97        < from: Xray Chest 1 View- PORTABLE-Routine (03.18.19 @ 10:27) >  EXAM:  XR CHEST PORTABLE ROUTINE 1V                            PROCEDURE DATE:  03/18/2019          INTERPRETATION:  INDICATION: CHF; follow-up assessment.    PRIORS: 3/17/2019 at 9:15 AM    VIEWS: Portable AP radiography of the chest performed.    FINDINGS: Since prior evaluation, no significant interval change is   identified. Heart size cannot quantitated on this portable evaluation. No   superior mediastinal widening is identified. There are patchy airspace   opacities identified bilaterally with bilateral pleural effusions. There   is no evidence for pneumothorax. No mediastinal shift is noted.   Degenerative change of the thoracic spine noted.    IMPRESSION: Findings consistent with continued CHF. Clinical correlation   and follow-up suggested.                MUNA ALONSO     < end of copied text >

## 2019-03-18 NOTE — PROGRESS NOTE ADULT - SUBJECTIVE AND OBJECTIVE BOX
INTERVAL HPI/OVERNIGHT EVENTS:       Antimicrobial:    Cardiovascular:  amLODIPine   Tablet 10 milliGRAM(s) Oral daily  furosemide   Injectable 40 milliGRAM(s) IV Push every 12 hours  hydrALAZINE 25 milliGRAM(s) Oral three times a day    Pulmonary:  ALBUTerol    90 MICROgram(s) HFA Inhaler 2 Puff(s) Inhalation every 6 hours PRN    Hematalogic:  heparin  Injectable 5000 Unit(s) SubCutaneous every 12 hours    Other:  acetaminophen   Tablet .. 650 milliGRAM(s) Oral every 8 hours PRN  artificial  tears Solution 1 Drop(s) Both EYES every 6 hours PRN  calcium acetate 667 milliGRAM(s) Oral four times a day with meals  chlorhexidine 4% Liquid 1 Application(s) Topical every 12 hours  docusate sodium 100 milliGRAM(s) Oral two times a day  nystatin Powder 1 Application(s) Topical every 12 hours  pantoprazole    Tablet 40 milliGRAM(s) Oral before breakfast  senna 2 Tablet(s) Oral at bedtime      Drug Dosing Weight  Height (cm): 152.4 (13 Mar 2019 11:40)  Weight (kg): 58.5 (13 Mar 2019 11:40)  BMI (kg/m2): 25.2 (13 Mar 2019 11:40)  BSA (m2): 1.55 (13 Mar 2019 11:40)    CENTRAL LINE: [ ] YES [x ] NO  LOCATION:   DATE INSERTED:    VILLALOBOS: [ ] YES [x ] NO    DATE INSERTED:    A-LINE:  [ ] YES [ ] NO  LOCATION:   DATE INSERTED:    PMH/Social Hx/Fam Hx -reviewed admission note, no change since admission  PAST MEDICAL & SURGICAL HISTORY:  Rectal bleeding  H/O gastroesophageal reflux (GERD)  Colitis  Anemia  CHF (congestive heart failure)  No significant past surgical history      T(C): 36.2 (03-18-19 @ 13:00), Max: 37 (03-17-19 @ 16:17)  HR: 70 (03-18-19 @ 13:00)  BP: 147/52 (03-18-19 @ 13:00)  BP(mean): 76 (03-18-19 @ 13:00)  ABP: --  ABP(mean): --  RR: 21 (03-18-19 @ 13:00)  SpO2: 100% (03-18-19 @ 13:00)  Wt(kg): --    ABG - ( 18 Mar 2019 08:24 )  pH, Arterial: 7.33  pH, Blood: x     /  pCO2: 55    /  pO2: 92    / HCO3: 28    / Base Excess: 1.8   /  SaO2: 97                    03-17 @ 07:01  -  03-18 @ 07:00  --------------------------------------------------------  IN: 1560 mL / OUT: 2535 mL / NET: -975 mL            PHYSICAL EXAM:    GENERAL: No signs of distress, comfortable  HEAD: Atraumatic, Normocephalic  EYES: EOMI, PERRLA  ENMT: No erythema, exudates, or enlargement, Moist mucous membranes +ETT  NECK: Supple, normal appearance, No JVD; [  ] central line (if applicable)  CHEST/LUNG: No chest deformity, fair bilateral air entry; No rales, rhonchi, wheezing; crackles  HEART: Regular rate and rhythm; No murmurs, rubs, or gallops;   ABDOMEN: Soft, Nontender, Nondistended; Bowel sounds present  EXTREMITIES:  + Peripheral Pulses, No clubbing, cyanosis, or edema  NERVOUS SYSTEM: awake and alert   LYMPH: No lymphadenopathy noted  SKIN: No rashes or lesions; good turgor, warm, dry      LABS:  CBC Full  -  ( 18 Mar 2019 06:40 )  WBC Count : 4.34 K/uL  Hemoglobin : 8.9 g/dL  Hematocrit : 29.7 %  Platelet Count - Automated : 154 K/uL  Mean Cell Volume : 101.4 fl  Mean Cell Hemoglobin : 30.4 pg  Mean Cell Hemoglobin Concentration : 30.0 gm/dL  Auto Neutrophil # : 2.42 K/uL  Auto Lymphocyte # : 1.24 K/uL  Auto Monocyte # : 0.57 K/uL  Auto Eosinophil # : 0.09 K/uL  Auto Basophil # : 0.01 K/uL  Auto Neutrophil % : 55.8 %  Auto Lymphocyte % : 28.6 %  Auto Monocyte % : 13.1 %  Auto Eosinophil % : 2.1 %  Auto Basophil % : 0.2 %    03-18    143  |  111<H>  |  90<H>  ----------------------------<  77  4.6   |  27  |  1.97<H>    Ca    8.1<L>      18 Mar 2019 06:40  Phos  4.7     03-18  Mg     2.2     03-18    TPro  6.2  /  Alb  2.6<L>  /  TBili  0.2  /  DBili  x   /  AST  13  /  ALT  26  /  AlkPhos  60  03-18            RADIOLOGY & ADDITIONAL STUDIES REVIEWED     CXR:< from: Xray Chest 1 View- PORTABLE-Routine (03.18.19 @ 10:27) >    EXAM:  XR CHEST PORTABLE ROUTINE 1V                            PROCEDURE DATE:  03/18/2019          INTERPRETATION:  INDICATION: CHF; follow-up assessment.    PRIORS: 3/17/2019 at 9:15 AM    VIEWS: Portable AP radiography of the chest performed.    FINDINGS: Since prior evaluation, no significant interval change is   identified. Heart size cannot quantitated on this portable evaluation. No   superior mediastinal widening is identified. There are patchy airspace   opacities identified bilaterally with bilateral pleural effusions. There   is no evidence for pneumothorax. No mediastinal shift is noted.   Degenerative change of the thoracic spine noted.    IMPRESSION: Findings consistent with continued CHF. Clinical correlation   and follow-up suggested.                MUNA ALONSO   This document has been electronically signed. Mar 18 2019 11:54AM                < end of copied text >      IMPRESSION:  PAST MEDICAL & SURGICAL HISTORY:  Rectal bleeding  H/O gastroesophageal reflux (GERD)  Colitis  Anemia  CHF (congestive heart failure)  No significant past surgical history   p/w           IMP: This is a 89 y/o F from [facility] w/ PMHx of GERD, HTN, anemia, colitis, rectal bleeding, chronic ankle swelling, presents to ED due to shortness of breath for 2 days admitted for management of hypercapnic hypoxic resp failure due to pulmonary edema and placed on NIPPV support. PT off bipap today. CXR peding.  Pul, cards and nephro f/u noted.  Blood gas still show resp acidosis but improved. BiPap at night.  keep villalobos and lasix for 24 hours          Plan:      CNS: no sedation    PULMONARY: BiPaP at night     CARDIAC: monitor    GI: feed    RENAL: keep  neg fluid balance, continue lasix for 24 hours and keep villalobos for i/o. Keep - fluid balance    SKIN: as per nursing    MUSCULOSKELETAL: Pt. OOB to chair    ID: no issue    HEME: monitor    DVT and GI Prophylaxis    GOALS OF CARE DISCUSSION WITH PATIENT/FAMILY/PROXY/ icu team on rounds

## 2019-03-18 NOTE — CHART NOTE - NSCHARTNOTEFT_GEN_A_CORE
89 y/o F from [facility] w/ PMHx of GERD, HTN, anemia, colitis, rectal bleeding, chronic ankle swelling, presents to ED due to shortness of breath for 2 days admitted for management of hypercapnic resp failure likely secondary to pulmonary edema and placed on NIV BIPAP.     Discussed GOC with daughter Ms. Briscoe over phone [phone], who wants the pt to be FULL CODE.    1) Respiratory failure with hypercapnea:  ABG s/o uncompensated respiratory acidosis which could be secondary to pulmonary edema may have contributed to encephalopathy.  Currently pt is more awake, following verbal commands  Pulmonary exam improving ; CXR improved   retaning co2 on morning ABG   c/w IV lasix 40mg bid   Continue BIPAP support at night time  Strict I/Os, net neg 1L  Start DASH diet    2) Pulmonary edema  Echo with grade 3 DD, moderate AS, mod to severe TR  Management as above  Cardiology- Dr. Medina     3) DENEEN   PT has BUN/ creat of 77/2.03  could have underlying CKD, unknown baseline.   On phoslo for hyperphosphatemia  Monitor BMP  f/u [facility]  Nephrology- Dr Guerrero    4) HTN  Pt has Hx of HTN, will continue home dose of hydralazine, norvasc and lasix  monitor BP closely    5) Anemia  PT has Hb 7.0 on admission, which improved to 9.3 s/p 1 PRBC  stool occult negative  pt has hx of rectal bleed, but no episodes of melena or hematemesis since admission  macrocytic anemia but Vit b12 and folate levels are WNL  Could have some underlying MDS or MM,  f/u SPEP, UPEP  Monitor CBC    6) need for prophylactic measure  DVT ppx with heparin SQ  GI ppx with protonix for GERD.    7) GOC discussion  Discussed GOC with Pt, Mirtha from palliative care was also present, pt had very clear wishes in case of emergency, pt would like to passed away naturally, would prefer trial of intubation. daughter was made aware. 89 y/o F from [facility] w/ PMHx of GERD, HTN, anemia, colitis, rectal bleeding, chronic ankle swelling, presents to ED due to shortness of breath for 2 days admitted for management of hypercapnic resp failure likely secondary to pulmonary edema and placed on NIV BIPAP.     Discussed GOC with daughter Ms. Briscoe over phone [phone], who wants the pt to be FULL CODE.    1) Respiratory failure with hypercapnea:  ABG s/o uncompensated respiratory acidosis which could be secondary to pulmonary edema may have contributed to encephalopathy.  Currently pt is more awake, following verbal commands  Pulmonary exam improving ; CXR improved   retaning co2 on morning ABG   c/w IV lasix 40mg bid   Continue BIPAP support at night time  Strict I/Os, net neg 1L  Start DASH diet    2) Pulmonary edema  Echo with grade 3 DD, moderate AS, mod to severe TR  Management as above  Cardiology- Dr. Medina     3) DENEEN   PT has BUN/ creat of 77/2.03  could have underlying CKD, unknown baseline.   On phoslo for hyperphosphatemia  Monitor BMP  f/u [facility]  Nephrology- Dr Guerrero    4) HTN  Pt has Hx of HTN, will continue home dose of hydralazine, norvasc and lasix  monitor BP closely    5) Anemia  PT has Hb 7.0 on admission, which improved to 9.3 s/p 1 PRBC  stool occult negative  pt has hx of rectal bleed, but no episodes of melena or hematemesis since admission  macrocytic anemia but Vit b12 and folate levels are WNL  Could have some underlying MDS or MM,  f/u SPEP, UPEP  Monitor CBC    6) need for prophylactic measure  DVT ppx with heparin SQ  GI ppx with protonix for GERD.    7) GOC discussion  Discussed GOC with Pt, Mirtha from palliative care was also present, pt had very clear wishes in case of emergency, pt would like to passed away naturally, would prefer trial of intubation. daughter was made aware.  .

## 2019-03-18 NOTE — PROGRESS NOTE ADULT - ASSESSMENT
Patient is seen and examined. Case reviewed with the medical team. Above note is appreciated. Will follow up clinically. Continue DVT prophylaxis. Case discussed with ICU attending and pulmonary. Patient with hypercapnic respiratory failure. Off BIPAP. Doing well . Continue inhalers. Daughter fully aware. Palliative consult appreciated. Patient fully A X O X 3. Wishes well known. DNR/DNI. Patient is down graded to 4 North.

## 2019-03-18 NOTE — PROGRESS NOTE ADULT - SUBJECTIVE AND OBJECTIVE BOX
CHIEF COMPLAINT: Patient is a 89y old  Female who presents with a chief complaint of shortness of breath. Pt appears comfortable.    	  REVIEW OF SYSTEMS:  CONSTITUTIONAL: No fever, weight loss, or fatigue  EYES: No eye pain, visual disturbances, or discharge  ENT:  No difficulty hearing, tinnitus, vertigo; No sinus or throat pain  NECK: No pain or stiffness  RESPIRATORY: No cough, wheezing, chills or hemoptysis; No Shortness of Breath  CARDIOVASCULAR: No chest pain, palpitations, passing out, dizziness, or leg swelling  GASTROINTESTINAL: No abdominal or epigastric pain. No nausea, vomiting, or hematemesis; No diarrhea or constipation. No melena or hematochezia.  GENITOURINARY: No dysuria, frequency, hematuria, or incontinence  NEUROLOGICAL: No headaches, memory loss, loss of strength, numbness, or tremors  SKIN: No itching, burning, rashes, or lesions   LYMPH Nodes: No enlarged glands  ENDOCRINE: No heat or cold intolerance; No hair loss  MUSCULOSKELETAL: No joint pain or swelling; No muscle, back, or extremity pain  PSYCHIATRIC: No depression, anxiety, mood swings, or difficulty sleeping  HEME/LYMPH: No easy bruising, or bleeding gums  ALLERGY AND IMMUNOLOGIC: No hives or eczema	      PHYSICAL EXAM:  T(C): 36.6 (03-18-19 @ 05:00), Max: 37 (03-17-19 @ 16:17)  HR: 70 (03-18-19 @ 10:00) (59 - 79)  BP: 147/49 (03-18-19 @ 10:00) (135/30 - 167/51)  RR: 24 (03-18-19 @ 10:00) (11 - 24)  SpO2: 96% (03-18-19 @ 10:00) (94% - 100%)    I&O's Summary    17 Mar 2019 07:01  -  18 Mar 2019 07:00  --------------------------------------------------------  IN: 1560 mL / OUT: 2535 mL / NET: -975 mL    18 Mar 2019 07:01  -  18 Mar 2019 10:47  --------------------------------------------------------  IN: 150 mL / OUT: 325 mL / NET: -175 mL        Appearance: Normal	  HEENT:   Normal oral mucosa, PERRL, EOMI	  Lymphatic: No lymphadenopathy  Cardiovascular: Normal S1 S2, No JVD, No murmurs, No edema  Respiratory: Lungs clear to auscultation	  Psychiatry: A & O x 3, Mood & affect appropriate  Gastrointestinal:  Soft, Non-tender, + BS	  Skin: No rashes, No ecchymoses, No cyanosis	  Neurologic: Non-focal  Extremities: Normal range of motion, No clubbing, cyanosis or edema  Vascular: Peripheral pulses palpable 2+ bilaterally    MEDICATIONS  (STANDING):  amLODIPine   Tablet 10 milliGRAM(s) Oral daily  calcium acetate 667 milliGRAM(s) Oral four times a day with meals  chlorhexidine 4% Liquid 1 Application(s) Topical every 12 hours  docusate sodium 100 milliGRAM(s) Oral two times a day  heparin  Injectable 5000 Unit(s) SubCutaneous every 12 hours  hydrALAZINE 25 milliGRAM(s) Oral three times a day  pantoprazole    Tablet 40 milliGRAM(s) Oral before breakfast  senna 2 Tablet(s) Oral at bedtime      	  LABS:	 	                        8.9    4.34  )-----------( 154      ( 18 Mar 2019 06:40 )             29.7     03-18    143  |  111<H>  |  90<H>  ----------------------------<  77  4.6   |  27  |  1.97<H>    Ca    8.1<L>      18 Mar 2019 06:40  Phos  4.7     03-18  Mg     2.2     03-18    TPro  6.2  /  Alb  2.6<L>  /  TBili  0.2  /  DBili  x   /  AST  13  /  ALT  26  /  AlkPhos  60  03-18    proBNP: Serum Pro-Brain Natriuretic Peptide: 35350 pg/mL (03-18 @ 06:40)  Serum Pro-Brain Natriuretic Peptide: 8592 pg/mL (03-13 @ 13:16)    Lipid Profile: Cholesterol 146  LDL 38    TG 25    HgA1c: Hemoglobin A1C, Whole Blood: 4.8 % (03-14 @ 10:39)    TSH: Thyroid Stimulating Hormone, Serum: 3.74 uU/mL (03-14 @ 06:39)

## 2019-03-18 NOTE — PROGRESS NOTE ADULT - SUBJECTIVE AND OBJECTIVE BOX
Patient is a 89y old  Female who presents with a chief complaint of shortness of breath (18 Mar 2019 10:47)    Pt is Awake, alert, comfortable in bed in NAD.  Remains on oxygen supp via NC. No cough or sob at this moment . Clinically stable.       INTERVAL HPI/OVERNIGHT EVENTS:      VITAL SIGNS:  T(F): 97.8 (03-18-19 @ 05:00)  HR: 70 (03-18-19 @ 10:00)  BP: 147/49 (03-18-19 @ 10:00)  RR: 24 (03-18-19 @ 10:00)  SpO2: 96% (03-18-19 @ 10:00)  Wt(kg): --  I&O's Detail    17 Mar 2019 07:01  -  18 Mar 2019 07:00  --------------------------------------------------------  IN:    Oral Fluid: 1560 mL  Total IN: 1560 mL    OUT:    Indwelling Catheter - Urethral: 2535 mL  Total OUT: 2535 mL    Total NET: -975 mL      18 Mar 2019 07:01  -  18 Mar 2019 11:09  --------------------------------------------------------  IN:    Oral Fluid: 150 mL  Total IN: 150 mL    OUT:    Indwelling Catheter - Urethral: 325 mL  Total OUT: 325 mL    Total NET: -175 mL              REVIEW OF SYSTEMS:    CONSTITUTIONAL:  No fevers, chills, sweats    HEENT:  Eyes:  No diplopia or blurred vision. ENT:  No earache, sore throat or runny nose.    CARDIOVASCULAR:  No pressure, squeezing, tightness, or heaviness about the chest; no palpitations.    RESPIRATORY:  Per HPI    GASTROINTESTINAL:  No abdominal pain, nausea, vomiting or diarrhea.    GENITOURINARY:  No dysuria, frequency or urgency.    NEUROLOGIC:  No paresthesias, fasciculations, seizures or weakness.    PSYCHIATRIC:  No disorder of thought or mood.      PHYSICAL EXAM:    Constitutional: Well developed and nourished  Eyes:Perrla  ENMT: normal  Neck:supple  Respiratory: wheezing +    Cardiovascular: S1 S2 regular  Gastrointestinal: Soft, Non tender  Extremities: + edema  Vascular:normal  Neurological:Awake, alert,Ox3  Musculoskeletal:Normal      MEDICATIONS  (STANDING):  amLODIPine   Tablet 10 milliGRAM(s) Oral daily  calcium acetate 667 milliGRAM(s) Oral four times a day with meals  chlorhexidine 4% Liquid 1 Application(s) Topical every 12 hours  docusate sodium 100 milliGRAM(s) Oral two times a day  heparin  Injectable 5000 Unit(s) SubCutaneous every 12 hours  hydrALAZINE 25 milliGRAM(s) Oral three times a day  pantoprazole    Tablet 40 milliGRAM(s) Oral before breakfast  senna 2 Tablet(s) Oral at bedtime    MEDICATIONS  (PRN):  acetaminophen   Tablet .. 650 milliGRAM(s) Oral every 8 hours PRN Moderate Pain (4 - 6)  ALBUTerol    90 MICROgram(s) HFA Inhaler 2 Puff(s) Inhalation every 6 hours PRN Shortness of Breath and/or Wheezing  artificial  tears Solution 1 Drop(s) Both EYES every 6 hours PRN Dry Eyes      Allergies    iodine (Unknown)  meperidine (Rash)  oxycodone (Other; Nausea)    Intolerances        LABS:                        8.9    4.34  )-----------( 154      ( 18 Mar 2019 06:40 )             29.7     03-18    143  |  111<H>  |  90<H>  ----------------------------<  77  4.6   |  27  |  1.97<H>    Ca    8.1<L>      18 Mar 2019 06:40  Phos  4.7     03-18  Mg     2.2     03-18    TPro  6.2  /  Alb  2.6<L>  /  TBili  0.2  /  DBili  x   /  AST  13  /  ALT  26  /  AlkPhos  60  03-18        ABG - ( 18 Mar 2019 08:24 )  pH, Arterial: 7.33  pH, Blood: x     /  pCO2: 55    /  pO2: 92    / HCO3: 28    / Base Excess: 1.8   /  SaO2: 97                    CAPILLARY BLOOD GLUCOSE        pro-bnp [id number] 03-18 @ 06:40     d-dimer --  03-18 @ 06:40  pro-bnp [id number] 03-13 @ 13:16     d-dimer --  03-13 @ 13:16      RADIOLOGY & ADDITIONAL TESTS:    CXR:  < from: Xray Chest 1 View- PORTABLE-Routine (03.16.19 @ 11:12) >  Impression: Cardiomegaly with pulmonary vascular congestion and bilateral   pleuraleffusions      < end of copied text >      Ct scan chest:    ekg;    echo: Patient is a 89y old  Female who presents with a chief complaint of shortness of breath (18 Mar 2019 10:47)    Pt is Awake, alert, comfortable in bed in NAD.  Remains on oxygen supp via NC. No cough or sob at this moment . Clinically stable.       INTERVAL HPI/OVERNIGHT EVENTS:      VITAL SIGNS:  T(F): 97.8 (03-18-19 @ 05:00)  HR: 70 (03-18-19 @ 10:00)  BP: 147/49 (03-18-19 @ 10:00)  RR: 24 (03-18-19 @ 10:00)  SpO2: 96% (03-18-19 @ 10:00)  Wt(kg): --  I&O's Detail    17 Mar 2019 07:01  -  18 Mar 2019 07:00  --------------------------------------------------------  IN:    Oral Fluid: 1560 mL  Total IN: 1560 mL    OUT:    Indwelling Catheter - Urethral: 2535 mL  Total OUT: 2535 mL    Total NET: -975 mL      18 Mar 2019 07:01  -  18 Mar 2019 11:09  --------------------------------------------------------  IN:    Oral Fluid: 150 mL  Total IN: 150 mL    OUT:    Indwelling Catheter - Urethral: 325 mL  Total OUT: 325 mL    Total NET: -175 mL              REVIEW OF SYSTEMS:    CONSTITUTIONAL:  No fevers, chills, sweats    HEENT:  Eyes:  No diplopia or blurred vision. ENT:  No earache, sore throat or runny nose.    CARDIOVASCULAR:  No pressure, squeezing, tightness, or heaviness about the chest; no palpitations.    RESPIRATORY:  Per HPI    GASTROINTESTINAL:  No abdominal pain, nausea, vomiting or diarrhea.    GENITOURINARY:  No dysuria, frequency or urgency.    NEUROLOGIC:  No paresthesias, fasciculations, seizures or weakness.    PSYCHIATRIC:  No disorder of thought or mood.      PHYSICAL EXAM:    Constitutional: Well developed and nourished  Eyes:Perrla  ENMT: normal  Neck:supple  Respiratory: Clear anteriorly    Cardiovascular: S1 S2 regular  Gastrointestinal: Soft, Non tender  Extremities: + edema  Vascular:normal  Neurological:Awake, alert,Ox3  Musculoskeletal:Normal      MEDICATIONS  (STANDING):  amLODIPine   Tablet 10 milliGRAM(s) Oral daily  calcium acetate 667 milliGRAM(s) Oral four times a day with meals  chlorhexidine 4% Liquid 1 Application(s) Topical every 12 hours  docusate sodium 100 milliGRAM(s) Oral two times a day  heparin  Injectable 5000 Unit(s) SubCutaneous every 12 hours  hydrALAZINE 25 milliGRAM(s) Oral three times a day  pantoprazole    Tablet 40 milliGRAM(s) Oral before breakfast  senna 2 Tablet(s) Oral at bedtime    MEDICATIONS  (PRN):  acetaminophen   Tablet .. 650 milliGRAM(s) Oral every 8 hours PRN Moderate Pain (4 - 6)  ALBUTerol    90 MICROgram(s) HFA Inhaler 2 Puff(s) Inhalation every 6 hours PRN Shortness of Breath and/or Wheezing  artificial  tears Solution 1 Drop(s) Both EYES every 6 hours PRN Dry Eyes      Allergies    iodine (Unknown)  meperidine (Rash)  oxycodone (Other; Nausea)    Intolerances        LABS:                        8.9    4.34  )-----------( 154      ( 18 Mar 2019 06:40 )             29.7     03-18    143  |  111<H>  |  90<H>  ----------------------------<  77  4.6   |  27  |  1.97<H>    Ca    8.1<L>      18 Mar 2019 06:40  Phos  4.7     03-18  Mg     2.2     03-18    TPro  6.2  /  Alb  2.6<L>  /  TBili  0.2  /  DBili  x   /  AST  13  /  ALT  26  /  AlkPhos  60  03-18        ABG - ( 18 Mar 2019 08:24 )  pH, Arterial: 7.33  pH, Blood: x     /  pCO2: 55    /  pO2: 92    / HCO3: 28    / Base Excess: 1.8   /  SaO2: 97                    CAPILLARY BLOOD GLUCOSE        pro-bnp [id number] 03-18 @ 06:40     d-dimer --  03-18 @ 06:40  pro-bnp 8592 03-13 @ 13:16     d-dimer --  03-13 @ 13:16      RADIOLOGY & ADDITIONAL TESTS:    CXR:  < from: Xray Chest 1 View- PORTABLE-Routine (03.16.19 @ 11:12) >  Impression: Cardiomegaly with pulmonary vascular congestion and bilateral   pleuraleffusions      < end of copied text >      Ct scan chest:    ekg;    echo:

## 2019-03-18 NOTE — PROGRESS NOTE ADULT - NSICDXPROBLEM_GEN_ALL_CORE_FT
PROBLEM DIAGNOSES  Problem: Hyperkalemia  Assessment and Plan: K+  4.8 mmol/L now  Monitor CMP  Renal F/U       Problem: Renal insufficiency  Assessment and Plan:  Chronic  Monitor labs.  Renal F/U  Continue meds  Avoid nephrotoxic drugs.         Problem: Hypercapnic respiratory failure  Assessment and Plan:  Likely 2nd to pulmonary edema.     XR shows increased interstitial lung markings with nonspecific right hilum prominence - unable to r/o mass or lymphadenopathy on XR.   Currently in ICU   Off Bi-pap  Oxygen supp  Bronchodilators  Steroids  IV Lasix    DVT PPX  Follow up CXR PROBLEM DIAGNOSES  Problem: Renal insufficiency  Assessment and Plan:  Chronic  Monitor labs.  Renal F/U  Continue meds  Avoid nephrotoxic drugs.         Problem: Hypercapnic respiratory failure  Assessment and Plan:  Likely 2nd to pulmonary edema.     XR shows increased interstitial lung markings with nonspecific right hilum prominence - unable to r/o mass or lymphadenopathy on XR.   Currently in ICU   Off Bi-pap  Oxygen supp  Bronchodilators  Steroids  IV Lasix    DVT PPX  Follow up CXR

## 2019-03-18 NOTE — PROGRESS NOTE ADULT - ASSESSMENT
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  Creatinine improved, off diuretics.    DENEEN due to exacerbation of COPD and CHF plus hypoxia.    CHF due to Diastolic CHF, valvular disease and LAE with pulmonary hypertension plus CKD.      COPD improved    Increased in PO4 due to renal failure, avoid high  dose of vitamin D at this time.  Follow po4 Phosphorus Level, Serum: 4.7 mg/dL (03.18.19 @ 06:40) improved.  Follow  SHPTHdue to renal failure      Urine for microalbumin and protein. Proteinuria ratio 1 gm

## 2019-03-18 NOTE — PROGRESS NOTE ADULT - ASSESSMENT
89 y/o F from [facility] w/ PMHx of GERD, HTN, anemia, colitis, rectal bleeding, chronic ankle swelling, presents to ED due to shortness of breath for 2 days admitted for management of hypercapnic resp failure likely secondary to pulmonary edema and placed on NIV BIPAP.     Discussed GOC with daughter Ms. Briscoe over phone [phone], who wants the pt to be FULL CODE.    1) Respiratory failure with hypercapnea:  ABG s/o uncompensated respiratory acidosis which could be secondary to pulmonary edema may have contributed to encephalopathy.  Currently pt is more awake, following verbal commands  Pulmonary exam improving ; CXR improved   retaning co2 on morning ABG  But still not negative fluid balance , will inc lasix to 40 q8   Continue BIPAP support prn   Strict I/Os, net neg 1L  Start DASH diet    2) Pulmonary edema  Echo with grade 3 DD, moderate AS, mod to severe TR  Management as above  Cardiology- Dr. Medina     3) DENEEN   PT has BUN/ creat of 77/2.03  could have underlying CKD, unknown baseline.   On phoslo for hyperphosphatemia  Monitor BMP  f/u [facility]  Nephrology- Dr Guerrero    4) HTN  Pt has Hx of HTN, will continue home dose of hydralazine, norvasc and lasix  monitor BP closely    5) Anemia  PT has Hb 7.0 on admission, which improved to 9.3 s/p 1 PRBC  stool occult negative  pt has hx of rectal bleed, but no episodes of melena or hematemesis since admission  macrocytic anemia but Vit b12 and folate levels are WNL  Could have some underlying MDS or MM,  f/u SPEP, UPEP  Monitor CBC    6) need for prophylactic measure  DVT ppx with heparin SQ  GI ppx with protonix for GERD.    7) GOC discussion  Discussed GOC with daughter Ms. Briscoe over phone [phone], who wants the pt to be FULL CODE. 89 y/o F from [facility] w/ PMHx of GERD, HTN, anemia, colitis, rectal bleeding, chronic ankle swelling, presents to ED due to shortness of breath for 2 days admitted for management of hypercapnic resp failure likely secondary to pulmonary edema and placed on NIV BIPAP.     Discussed GOC with daughter Ms. Briscoe over phone [phone], who wants the pt to be FULL CODE.    1) Respiratory failure with hypercapnea:  ABG s/o uncompensated respiratory acidosis which could be secondary to pulmonary edema may have contributed to encephalopathy.  Currently pt is more awake, following verbal commands  Pulmonary exam improving ; CXR improved   retaning co2 on morning ABG   c/w IV lasix 40mg bid   Continue BIPAP support at night time  Strict I/Os, net neg 1L  Start DASH diet    2) Pulmonary edema  Echo with grade 3 DD, moderate AS, mod to severe TR  Management as above  Cardiology- Dr. Medina     3) DENEEN   PT has BUN/ creat of 77/2.03  could have underlying CKD, unknown baseline.   On phoslo for hyperphosphatemia  Monitor BMP  f/u [facility]  Nephrology- Dr Guerrero    4) HTN  Pt has Hx of HTN, will continue home dose of hydralazine, norvasc and lasix  monitor BP closely    5) Anemia  PT has Hb 7.0 on admission, which improved to 9.3 s/p 1 PRBC  stool occult negative  pt has hx of rectal bleed, but no episodes of melena or hematemesis since admission  macrocytic anemia but Vit b12 and folate levels are WNL  Could have some underlying MDS or MM,  f/u SPEP, UPEP  Monitor CBC    6) need for prophylactic measure  DVT ppx with heparin SQ  GI ppx with protonix for GERD.    7) GOC discussion  Discussed GOC with daughter Ms. Briscoe over phone [phone], who wants the pt to be FULL CODE.

## 2019-03-18 NOTE — PROGRESS NOTE ADULT - SUBJECTIVE AND OBJECTIVE BOX
INTERVAL HPI/OVERNIGHT EVENTS: pt was seen and examined at bedside, offering no complains, pt was off the bipap over night.     PRESSORS: No    Cardiovascular:  amLODIPine   Tablet 10 milliGRAM(s) Oral daily  hydrALAZINE 25 milliGRAM(s) Oral three times a day    Pulmonary:  ALBUTerol    90 MICROgram(s) HFA Inhaler 2 Puff(s) Inhalation every 6 hours PRN    Hematalogic:  heparin  Injectable 5000 Unit(s) SubCutaneous every 12 hours    Other:  acetaminophen   Tablet .. 650 milliGRAM(s) Oral every 8 hours PRN  artificial  tears Solution 1 Drop(s) Both EYES every 6 hours PRN  calcium acetate 667 milliGRAM(s) Oral four times a day with meals  chlorhexidine 4% Liquid 1 Application(s) Topical every 12 hours  docusate sodium 100 milliGRAM(s) Oral two times a day  pantoprazole    Tablet 40 milliGRAM(s) Oral before breakfast  senna 2 Tablet(s) Oral at bedtime    acetaminophen   Tablet .. 650 milliGRAM(s) Oral every 8 hours PRN  ALBUTerol    90 MICROgram(s) HFA Inhaler 2 Puff(s) Inhalation every 6 hours PRN  amLODIPine   Tablet 10 milliGRAM(s) Oral daily  artificial  tears Solution 1 Drop(s) Both EYES every 6 hours PRN  calcium acetate 667 milliGRAM(s) Oral four times a day with meals  chlorhexidine 4% Liquid 1 Application(s) Topical every 12 hours  docusate sodium 100 milliGRAM(s) Oral two times a day  heparin  Injectable 5000 Unit(s) SubCutaneous every 12 hours  hydrALAZINE 25 milliGRAM(s) Oral three times a day  pantoprazole    Tablet 40 milliGRAM(s) Oral before breakfast  senna 2 Tablet(s) Oral at bedtime    Drug Dosing Weight  Height (cm): 152.4 (13 Mar 2019 11:40)  Weight (kg): 58.5 (13 Mar 2019 11:40)  BMI (kg/m2): 25.2 (13 Mar 2019 11:40)  BSA (m2): 1.55 (13 Mar 2019 11:40)    GUZMAN: Yes      PMH -reviewed admission note, no change since admission    ICU Vital Signs Last 24 Hrs  T(C): 36.6 (18 Mar 2019 05:00), Max: 37 (17 Mar 2019 16:17)  T(F): 97.8 (18 Mar 2019 05:00), Max: 98.6 (17 Mar 2019 16:17)  HR: 66 (18 Mar 2019 08:40) (59 - 70)  BP: 157/47 (18 Mar 2019 07:00) (135/30 - 167/51)  BP(mean): 76 (18 Mar 2019 07:00) (58 - 84)  ABP: --  ABP(mean): --  RR: 19 (18 Mar 2019 07:00) (11 - 23)  SpO2: 94% (18 Mar 2019 08:40) (94% - 100%)      ABG - ( 18 Mar 2019 08:24 )  pH, Arterial: 7.33  pH, Blood: x     /  pCO2: 55    /  pO2: 92    / HCO3: 28    / Base Excess: 1.8   /  SaO2: 97                    03-17 @ 07:01  -  03-18 @ 07:00  --------------------------------------------------------  IN: 1560 mL / OUT: 2535 mL / NET: -975 mL            PHYSICAL EXAM:    GENERAL: NAD, well-groomed, well-developed  HEAD:  Atraumatic, Normocephalic  EYES: EOMI, PERRLA, conjunctiva and sclera clear  ENMT: No tonsillar erythema, exudates, or enlargement; Moist mucous membranes, Good dentition, No lesions  NECK: Supple, normal appearance, No JVD; Normal thyroid; Trachea midline  NERVOUS SYSTEM:  Alert & Oriented X3, Good concentration; Motor Strength 5/5 B/L upper and lower extremities; DTRs 2+ intact and symmetric  CHEST/LUNG: B/L rhonchi, crackles   HEART: Regular rate and rhythm; No murmurs, rubs, or gallops  ABDOMEN: Soft, Nontender, Nondistended; Bowel sounds present  EXTREMITIES:  2+ Peripheral Pulses, No clubbing, cyanosis, or edema  LYMPH: No lymphadenopathy noted  SKIN: No rashes or lesions; Good capillary refill      LABS:  CBC Full  -  ( 18 Mar 2019 06:40 )  WBC Count : 4.34 K/uL  Hemoglobin : 8.9 g/dL  Hematocrit : 29.7 %  Platelet Count - Automated : 154 K/uL  Mean Cell Volume : 101.4 fl  Mean Cell Hemoglobin : 30.4 pg  Mean Cell Hemoglobin Concentration : 30.0 gm/dL  Auto Neutrophil # : 2.42 K/uL  Auto Lymphocyte # : 1.24 K/uL  Auto Monocyte # : 0.57 K/uL  Auto Eosinophil # : 0.09 K/uL  Auto Basophil # : 0.01 K/uL  Auto Neutrophil % : 55.8 %  Auto Lymphocyte % : 28.6 %  Auto Monocyte % : 13.1 %  Auto Eosinophil % : 2.1 %  Auto Basophil % : 0.2 %    03-18    143  |  111<H>  |  90<H>  ----------------------------<  77  4.6   |  27  |  1.97<H>    Ca    8.1<L>      18 Mar 2019 06:40  Phos  4.7     03-18  Mg     2.2     03-18    TPro  6.2  /  Alb  2.6<L>  /  TBili  0.2  /  DBili  x   /  AST  13  /  ALT  26  /  AlkPhos  60  03-18            RADIOLOGY & ADDITIONAL STUDIES REVIEWED:     EXAM:  XR CHEST PORTABLE ROUTINE 1V                            PROCEDURE DATE:  03/17/2019          INTERPRETATION:  Clinical history: 89-year-old female, CHF.    2 expiratory/kyphotic views are compared to 3/16/2019 and demonstrate   moderate cardiomegaly in the absence of evidence for pericardial   effusion, unchanged. Mild to moderate pulmonary venous congestion,   improved.    Bilateral haze consistent with large effusions/areas of   atelectasis/possible consolidation, grossly unchanged. Fluid/thickening   at the minor fissure again evident.    Moderate degenerative change in the visualized osseous structures with no   acute findings.    Impression    Mild to moderate pulmonary venous congestion, improved.    Large bilateral pleural effusions/areas of atelectasis/possible   elevation, grossly unchanged                HAILY ACUNA M.D., ATTENDING RADIOLOGIST  This document has been electronically signed. Mar 17 2019  1:29PM                GOALS OF CARE DISCUSSION WITH PATIENT/FAMILY/PROXY:    CRITICAL CARE TIME SPENT: 35 minutes

## 2019-03-19 DIAGNOSIS — D64.9 ANEMIA, UNSPECIFIED: ICD-10-CM

## 2019-03-19 LAB
ANION GAP SERPL CALC-SCNC: 3 MMOL/L — LOW (ref 5–17)
BUN SERPL-MCNC: 82 MG/DL — HIGH (ref 7–18)
CALCIUM SERPL-MCNC: 8.4 MG/DL — SIGNIFICANT CHANGE UP (ref 8.4–10.5)
CHLORIDE SERPL-SCNC: 109 MMOL/L — HIGH (ref 96–108)
CO2 SERPL-SCNC: 30 MMOL/L — SIGNIFICANT CHANGE UP (ref 22–31)
CREAT SERPL-MCNC: 1.72 MG/DL — HIGH (ref 0.5–1.3)
GLUCOSE SERPL-MCNC: 85 MG/DL — SIGNIFICANT CHANGE UP (ref 70–99)
HCT VFR BLD CALC: 29.8 % — LOW (ref 34.5–45)
HGB BLD-MCNC: 8.9 G/DL — LOW (ref 11.5–15.5)
MAGNESIUM SERPL-MCNC: 2.1 MG/DL — SIGNIFICANT CHANGE UP (ref 1.6–2.6)
MCHC RBC-ENTMCNC: 29.9 GM/DL — LOW (ref 32–36)
MCHC RBC-ENTMCNC: 30.2 PG — SIGNIFICANT CHANGE UP (ref 27–34)
MCV RBC AUTO: 101 FL — HIGH (ref 80–100)
NRBC # BLD: 0 /100 WBCS — SIGNIFICANT CHANGE UP (ref 0–0)
PHOSPHATE SERPL-MCNC: 3.7 MG/DL — SIGNIFICANT CHANGE UP (ref 2.5–4.5)
PLATELET # BLD AUTO: 148 K/UL — LOW (ref 150–400)
POTASSIUM SERPL-MCNC: 4.4 MMOL/L — SIGNIFICANT CHANGE UP (ref 3.5–5.3)
POTASSIUM SERPL-SCNC: 4.4 MMOL/L — SIGNIFICANT CHANGE UP (ref 3.5–5.3)
RBC # BLD: 2.95 M/UL — LOW (ref 3.8–5.2)
RBC # FLD: 15.7 % — HIGH (ref 10.3–14.5)
SODIUM SERPL-SCNC: 142 MMOL/L — SIGNIFICANT CHANGE UP (ref 135–145)
WBC # BLD: 4.11 K/UL — SIGNIFICANT CHANGE UP (ref 3.8–10.5)
WBC # FLD AUTO: 4.11 K/UL — SIGNIFICANT CHANGE UP (ref 3.8–10.5)

## 2019-03-19 PROCEDURE — 76775 US EXAM ABDO BACK WALL LIM: CPT | Mod: 26

## 2019-03-19 PROCEDURE — 93971 EXTREMITY STUDY: CPT | Mod: 26,RT

## 2019-03-19 RX ADMIN — SENNA PLUS 2 TABLET(S): 8.6 TABLET ORAL at 21:53

## 2019-03-19 RX ADMIN — CHLORHEXIDINE GLUCONATE 1 APPLICATION(S): 213 SOLUTION TOPICAL at 17:36

## 2019-03-19 RX ADMIN — NYSTATIN CREAM 1 APPLICATION(S): 100000 CREAM TOPICAL at 17:35

## 2019-03-19 RX ADMIN — Medication 25 MILLIGRAM(S): at 21:53

## 2019-03-19 RX ADMIN — Medication 650 MILLIGRAM(S): at 21:52

## 2019-03-19 RX ADMIN — Medication 667 MILLIGRAM(S): at 21:53

## 2019-03-19 RX ADMIN — Medication 650 MILLIGRAM(S): at 22:40

## 2019-03-19 RX ADMIN — HEPARIN SODIUM 5000 UNIT(S): 5000 INJECTION INTRAVENOUS; SUBCUTANEOUS at 17:35

## 2019-03-19 RX ADMIN — AMLODIPINE BESYLATE 10 MILLIGRAM(S): 2.5 TABLET ORAL at 06:02

## 2019-03-19 RX ADMIN — HEPARIN SODIUM 5000 UNIT(S): 5000 INJECTION INTRAVENOUS; SUBCUTANEOUS at 06:01

## 2019-03-19 RX ADMIN — NYSTATIN CREAM 1 APPLICATION(S): 100000 CREAM TOPICAL at 06:52

## 2019-03-19 RX ADMIN — Medication 25 MILLIGRAM(S): at 06:02

## 2019-03-19 RX ADMIN — Medication 100 MILLIGRAM(S): at 17:35

## 2019-03-19 RX ADMIN — Medication 667 MILLIGRAM(S): at 17:36

## 2019-03-19 RX ADMIN — Medication 25 MILLIGRAM(S): at 13:08

## 2019-03-19 RX ADMIN — Medication 100 MILLIGRAM(S): at 06:01

## 2019-03-19 RX ADMIN — Medication 667 MILLIGRAM(S): at 07:41

## 2019-03-19 RX ADMIN — Medication 667 MILLIGRAM(S): at 13:08

## 2019-03-19 RX ADMIN — Medication 40 MILLIGRAM(S): at 06:01

## 2019-03-19 RX ADMIN — CHLORHEXIDINE GLUCONATE 1 APPLICATION(S): 213 SOLUTION TOPICAL at 06:52

## 2019-03-19 RX ADMIN — PANTOPRAZOLE SODIUM 40 MILLIGRAM(S): 20 TABLET, DELAYED RELEASE ORAL at 06:02

## 2019-03-19 RX ADMIN — Medication 40 MILLIGRAM(S): at 17:35

## 2019-03-19 NOTE — PROGRESS NOTE ADULT - SUBJECTIVE AND OBJECTIVE BOX
Pt is awake, alert, lying in bed in NAD. Using BIPAP QHS, denies SOB/cough at present.     INTERVAL HPI/OVERNIGHT EVENTS:      VITAL SIGNS:  T(F): 98.4 (03-19-19 @ 04:47)  HR: 70 (03-19-19 @ 04:47)  BP: 168/56 (03-19-19 @ 04:47)  RR: 17 (03-19-19 @ 04:47)  SpO2: 98% (03-19-19 @ 04:47)  Wt(kg): --  I&O's Detail    18 Mar 2019 07:01  -  19 Mar 2019 07:00  --------------------------------------------------------  IN:    Oral Fluid: 300 mL  Total IN: 300 mL    OUT:    Indwelling Catheter - Urethral: 2000 mL  Total OUT: 2000 mL    Total NET: -1700 mL              REVIEW OF SYSTEMS:    CONSTITUTIONAL:  No fevers, chills, sweats    HEENT:  Eyes:  No diplopia or blurred vision. ENT:  No earache, sore throat or runny nose.    CARDIOVASCULAR:  No pressure, squeezing, tightness, or heaviness about the chest; no palpitations.    RESPIRATORY:  Per HPI    GASTROINTESTINAL:  No abdominal pain, nausea, vomiting or diarrhea.    GENITOURINARY:  No dysuria, frequency or urgency.    NEUROLOGIC:  No paresthesias, fasciculations, seizures or weakness.    PSYCHIATRIC:  No disorder of thought or mood.      PHYSICAL EXAM:    Constitutional: Well developed and nourished  Eyes:Perrla  ENMT: normal  Neck:supple  Respiratory: good air entry  Cardiovascular: S1 S2 regular  Gastrointestinal: Soft, Non tender  Extremities: No edema  Vascular:normal  Neurological:Awake, alert,Ox3  Musculoskeletal: weak      MEDICATIONS  (STANDING):  amLODIPine   Tablet 10 milliGRAM(s) Oral daily  calcium acetate 667 milliGRAM(s) Oral four times a day with meals  chlorhexidine 4% Liquid 1 Application(s) Topical every 12 hours  docusate sodium 100 milliGRAM(s) Oral two times a day  furosemide   Injectable 40 milliGRAM(s) IV Push every 12 hours  heparin  Injectable 5000 Unit(s) SubCutaneous every 12 hours  hydrALAZINE 25 milliGRAM(s) Oral three times a day  nystatin Powder 1 Application(s) Topical every 12 hours  pantoprazole    Tablet 40 milliGRAM(s) Oral before breakfast  senna 2 Tablet(s) Oral at bedtime    MEDICATIONS  (PRN):  acetaminophen   Tablet .. 650 milliGRAM(s) Oral every 8 hours PRN Moderate Pain (4 - 6)  ALBUTerol    90 MICROgram(s) HFA Inhaler 2 Puff(s) Inhalation every 6 hours PRN Shortness of Breath and/or Wheezing  artificial  tears Solution 1 Drop(s) Both EYES every 6 hours PRN Dry Eyes      Allergies    iodine (Unknown)  meperidine (Rash)  oxycodone (Other; Nausea)    Intolerances        LABS:                        8.9    4.11  )-----------( 148      ( 19 Mar 2019 04:28 )             29.8     03-19    142  |  109<H>  |  82<H>  ----------------------------<  85  4.4   |  30  |  1.72<H>    Ca    8.4      19 Mar 2019 04:39  Phos  3.7     03-19  Mg     2.1     03-19    TPro  6.2  /  Alb  2.6<L>  /  TBili  0.2  /  DBili  x   /  AST  13  /  ALT  26  /  AlkPhos  60  03-18        ABG - ( 18 Mar 2019 08:24 )  pH, Arterial: 7.33  pH, Blood: x     /  pCO2: 55    /  pO2: 92    / HCO3: 28    / Base Excess: 1.8   /  SaO2: 97                    CAPILLARY BLOOD GLUCOSE      POCT Blood Glucose.: 97 mg/dL (19 Mar 2019 07:56)    pro-bnp 00966 03-18 @ 06:40     d-dimer --  03-18 @ 06:40  pro-bnp 8592 03-13 @ 13:16     d-dimer --  03-13 @ 13:16      RADIOLOGY & ADDITIONAL TESTS:  < from: US Duplex Venous Lower Ext Complete, Bilateral (03.13.19 @ 14:38) >  IMPRESSION:     No evidence of deep venous thrombosis.    < end of copied text >    CXR:  < from: Xray Chest 1 View- PORTABLE-Routine (03.18.19 @ 10:27) >  IMPRESSION: Findings consistent with continued CHF. Clinical correlation   and follow-up suggested.    < end of copied text >    Ct scan chest:    ekg;    echo:

## 2019-03-19 NOTE — PROGRESS NOTE ADULT - NSICDXPROBLEM_GEN_ALL_CORE_FT
PROBLEM DIAGNOSES  Problem: Acute on chronic diastolic heart failure  Assessment and Plan: Continue lasix IVP. May switch to po tomorrow.  Continue medication.  Repeat CXR tomorrow.    Problem: Acute hypercapnic respiratory failure  Assessment and Plan: Continue oxygen.  BIPAP nightly.  Repeat ABG in am.  Continue bronchodilators.    Problem: Severe anemia  Assessment and Plan: S/P 1U PRBCs  Continue to monitor.  Continue protonix.     Problem: Renal insufficiency  Assessment and Plan: Renal ultrasound pending.  Creatinine improving continue to monitor.

## 2019-03-19 NOTE — PROGRESS NOTE ADULT - SUBJECTIVE AND OBJECTIVE BOX
DNR [x ]   DNI  [  ]  Trial of intubation    INTERVAL HPI/OVERNIGHT EVENTS: ***No overnight events. Used BIPAP    PRESSORS: [ ] YES [x ] NO  WHICH:    ANTIBIOTICS:                  DATE STARTED:      Cardiovascular:  Heart Failure  Acute   Acute on Chronic  Chronic       amLODIPine   Tablet 10 milliGRAM(s) Oral daily  furosemide   Injectable 40 milliGRAM(s) IV Push every 12 hours  hydrALAZINE 25 milliGRAM(s) Oral three times a day    Pulmonary:  ALBUTerol    90 MICROgram(s) HFA Inhaler 2 Puff(s) Inhalation every 6 hours PRN    Hematalogic:  heparin  Injectable 5000 Unit(s) SubCutaneous every 12 hours    Other:  acetaminophen   Tablet .. 650 milliGRAM(s) Oral every 8 hours PRN  artificial  tears Solution 1 Drop(s) Both EYES every 6 hours PRN  calcium acetate 667 milliGRAM(s) Oral four times a day with meals  chlorhexidine 4% Liquid 1 Application(s) Topical every 12 hours  docusate sodium 100 milliGRAM(s) Oral two times a day  nystatin Powder 1 Application(s) Topical every 12 hours  pantoprazole    Tablet 40 milliGRAM(s) Oral before breakfast  senna 2 Tablet(s) Oral at bedtime    acetaminophen   Tablet .. 650 milliGRAM(s) Oral every 8 hours PRN  ALBUTerol    90 MICROgram(s) HFA Inhaler 2 Puff(s) Inhalation every 6 hours PRN  amLODIPine   Tablet 10 milliGRAM(s) Oral daily  artificial  tears Solution 1 Drop(s) Both EYES every 6 hours PRN  calcium acetate 667 milliGRAM(s) Oral four times a day with meals  chlorhexidine 4% Liquid 1 Application(s) Topical every 12 hours  docusate sodium 100 milliGRAM(s) Oral two times a day  furosemide   Injectable 40 milliGRAM(s) IV Push every 12 hours  heparin  Injectable 5000 Unit(s) SubCutaneous every 12 hours  hydrALAZINE 25 milliGRAM(s) Oral three times a day  nystatin Powder 1 Application(s) Topical every 12 hours  pantoprazole    Tablet 40 milliGRAM(s) Oral before breakfast  senna 2 Tablet(s) Oral at bedtime    Drug Dosing Weight  Height (cm): 152.4 (13 Mar 2019 11:40)  Weight (kg): 58.5 (13 Mar 2019 11:40)  BMI (kg/m2): 25.2 (13 Mar 2019 11:40)  BSA (m2): 1.55 (13 Mar 2019 11:40)    CENTRAL LINE: [ ] YES [x ] NO  LOCATION:   DATE INSERTED:  REMOVE: [ ] YES [ ] NO  EXPLAIN:    GUZMAN: [x ] YES [ ] NO    DATE INSERTED:  REMOVE:  [ ] YES [x ] NO  EXPLAIN: Urinary retention    A-LINE:  [ ] YES [x ] NO  LOCATION:   DATE INSERTED:  REMOVE:  [ ] YES [ ] NO  EXPLAIN:    PAST MEDICAL & SURGICAL HISTORY:  Rectal bleeding  H/O gastroesophageal reflux (GERD)  Colitis  Anemia  CHF (congestive heart failure)  No significant past surgical history        ABG - ( 18 Mar 2019 08:24 )  pH, Arterial: 7.33  pH, Blood: x     /  pCO2: 55    /  pO2: 92    / HCO3: 28    / Base Excess: 1.8   /  SaO2: 97                    03-18 @ 07:01  -  03-19 @ 07:00  --------------------------------------------------------  IN: 300 mL / OUT: 2000 mL / NET: -1700 mL            PHYSICAL EXAM:    GENERAL: NAD, well-groomed, well-developed  HEAD:  Atraumatic, Normocephalic  EYES: EOMI, PERRLA, conjunctiva and sclera clear  ENMT: No tonsillar erythema, exudates  NECK: Supple, No JVD  NERVOUS SYSTEM:  Alert & Oriented X3, Good concentration; Moving all extremities  CHEST/LUNG: Diminished breath sounds bilateral bases. +crackles left base  HEART: Regular rate and rhythm; No murmurs, rubs, or gallops  ABDOMEN: Soft, Nontender, Nondistended; Bowel sounds present  EXTREMITIES:  2+ Peripheral Pulses, No clubbing, cyanosis, or edema  LYMPH: No lymphadenopathy noted  SKIN: No rashes or lesions      LABS:  CBC Full  -  ( 19 Mar 2019 04:28 )  WBC Count : 4.11 K/uL  Hemoglobin : 8.9 g/dL  Hematocrit : 29.8 %  Platelet Count - Automated : 148 K/uL  Mean Cell Volume : 101.0 fl  Mean Cell Hemoglobin : 30.2 pg  Mean Cell Hemoglobin Concentration : 29.9 gm/dL  Auto Neutrophil # : x  Auto Lymphocyte # : x  Auto Monocyte # : x  Auto Eosinophil # : x  Auto Basophil # : x  Auto Neutrophil % : x  Auto Lymphocyte % : x  Auto Monocyte % : x  Auto Eosinophil % : x  Auto Basophil % : x    03-19    142  |  109<H>  |  82<H>  ----------------------------<  85  4.4   |  30  |  1.72<H>    Ca    8.4      19 Mar 2019 04:39  Phos  3.7     03-19  Mg     2.1     03-19    TPro  6.2  /  Alb  2.6<L>  /  TBili  0.2  /  DBili  x   /  AST  13  /  ALT  26  /  AlkPhos  60  03-18              [ x ]  DVT Prophylaxis  Boots  [  ]  Nutrition, Brand, Rate   DASH         Goal Rate             RADIOLOGY & ADDITIONAL STUDIES:  ***  < from: Xray Chest 1 View- PORTABLE-Routine (03.18.19 @ 10:27) >  Since prior evaluation, no significant interval change is   identified. Heart size cannot quantitated on this portable evaluation. No   superior mediastinal widening is identified. There are patchy airspace   opacities identified bilaterally with bilateral pleural effusions. There   is no evidence for pneumothorax. No mediastinal shift is noted.   Degenerative change of the thoracic spine noted.    IMPRESSION: Findings consistent with continued CHF. Clinical correlation   and follow-up suggested.    < end of copied text >    [  ] Goals of Care Discussion with Family/Proxy/Other           Elements of Conversation Discussed: Patient/Family understanding of current illness   Advanced Directives                                                                       Prognosis  Treatment Options  Care Aligned with patient's wishes                                             TIME SPENT: 35 minutes

## 2019-03-19 NOTE — PROGRESS NOTE ADULT - SUBJECTIVE AND OBJECTIVE BOX
CHIEF COMPLAINT:Patient is a 89y old  Female who presents with a chief complaint of shortness of breath (19 Mar 2019 12:55)    	  REVIEW OF SYSTEMS:  CONSTITUTIONAL: No fever, weight loss, or fatigue  EYES: No eye pain, visual disturbances, or discharge  ENMT:  No difficulty hearing, tinnitus, vertigo; No sinus or throat pain  NECK: No pain or stiffness  RESPIRATORY: No cough, wheezing, chills or hemoptysis; No Shortness of Breath  CARDIOVASCULAR: No chest pain, palpitations, passing out, dizziness, or leg swelling  GASTROINTESTINAL: No abdominal or epigastric pain. No nausea, vomiting, or hematemesis; No diarrhea or constipation. No melena or hematochezia.  GENITOURINARY: No dysuria, frequency, hematuria, or incontinence  NEUROLOGICAL: No headaches, memory loss, loss of strength, numbness, or tremors  SKIN: No itching, burning, rashes, or lesions   LYMPH Nodes: No enlarged glands  ENDOCRINE: No heat or cold intolerance; No hair loss  MUSCULOSKELETAL: No joint pain or swelling; No muscle, back, or extremity pain  PSYCHIATRIC: No depression, anxiety, mood swings, or difficulty sleeping  HEME/LYMPH: No easy bruising, or bleeding gums  ALLERY AND IMMUNOLOGIC: No hives or eczema	    [ ] All others negative	  [ ] Unable to obtain    PHYSICAL EXAM:  T(C): 37.3 (03-19-19 @ 20:25), Max: 37.3 (03-19-19 @ 20:25)  HR: 74 (03-19-19 @ 20:25) (70 - 74)  BP: 163/51 (03-19-19 @ 20:25) (163/51 - 168/56)  RR: 18 (03-19-19 @ 20:25) (17 - 18)  SpO2: 100% (03-19-19 @ 20:25) (98% - 100%)  Wt(kg): --  I&O's Summary    18 Mar 2019 07:01  -  19 Mar 2019 07:00  --------------------------------------------------------  IN: 300 mL / OUT: 2000 mL / NET: -1700 mL    19 Mar 2019 07:01  -  19 Mar 2019 23:39  --------------------------------------------------------  IN: 0 mL / OUT: 2500 mL / NET: -2500 mL        Appearance: Normal	  HEENT:   Normal oral mucosa, PERRL, EOMI	  Lymphatic: No lymphadenopathy  Cardiovascular: Normal S1 S2, No JVD, No murmurs, No edema  Respiratory: Lungs clear to auscultation	  Psychiatry: A & O x 3, Mood & affect appropriate  Gastrointestinal:  Soft, Non-tender, + BS	  Skin: No rashes, No ecchymoses, No cyanosis	  Neurologic: Non-focal  Extremities: Normal range of motion, No clubbing, cyanosis or edema  Vascular: Peripheral pulses palpable 2+ bilaterally    MEDICATIONS  (STANDING):  amLODIPine   Tablet 10 milliGRAM(s) Oral daily  calcium acetate 667 milliGRAM(s) Oral four times a day with meals  chlorhexidine 4% Liquid 1 Application(s) Topical every 12 hours  docusate sodium 100 milliGRAM(s) Oral two times a day  furosemide   Injectable 40 milliGRAM(s) IV Push every 12 hours  heparin  Injectable 5000 Unit(s) SubCutaneous every 12 hours  hydrALAZINE 25 milliGRAM(s) Oral three times a day  nystatin Powder 1 Application(s) Topical every 12 hours  pantoprazole    Tablet 40 milliGRAM(s) Oral before breakfast  senna 2 Tablet(s) Oral at bedtime      TELEMETRY: 	    ECG:  	  RADIOLOGY:  OTHER: 	  	  CBC Full  -  ( 19 Mar 2019 04:28 )  WBC Count : 4.11 K/uL  Hemoglobin : 8.9 g/dL  Hematocrit : 29.8 %  Platelet Count - Automated : 148 K/uL  Mean Cell Volume : 101.0 fl  Mean Cell Hemoglobin : 30.2 pg  Mean Cell Hemoglobin Concentration : 29.9 gm/dL  Auto Neutrophil # : x  Auto Lymphocyte # : x  Auto Monocyte # : x  Auto Eosinophil # : x  Auto Basophil # : x  Auto Neutrophil % : x  Auto Lymphocyte % : x  Auto Monocyte % : x  Auto Eosinophil % : x  Auto Basophil % : x        CARDIAC MARKERS:                              8.9    4.11  )-----------( 148      ( 19 Mar 2019 04:28 )             29.8       03-19    142  |  109<H>  |  82<H>  ----------------------------<  85  4.4   |  30  |  1.72<H>    Ca    8.4      19 Mar 2019 04:39  Phos  3.7     03-19  Mg     2.1     03-19    TPro  6.2  /  Alb  2.6<L>  /  TBili  0.2  /  DBili  x   /  AST  13  /  ALT  26  /  AlkPhos  60  03-18            proBNP: Serum Pro-Brain Natriuretic Peptide: 12093 pg/mL (03-18 @ 06:40)  Serum Pro-Brain Natriuretic Peptide: 8592 pg/mL (03-13 @ 13:16)    Lipid Profile: Cholesterol 146  LDL 38    TG 25    HgA1c: Hemoglobin A1C, Whole Blood: 4.8 % (03-14 @ 10:39)    TSH: Thyroid Stimulating Hormone, Serum: 3.74 uU/mL (03-14 @ 06:39)    ABG - ( 18 Mar 2019 08:24 )  pH, Arterial: 7.33  pH, Blood: x     /  pCO2: 55    /  pO2: 92    / HCO3: 28    / Base Excess: 1.8   /  SaO2: 97        < from: US Duplex Venous Lower Ext Ltd, Right (03.19.19 @ 19:09) >  EXAM:  US DPLX LWR EXT VEINS LTD RT                            PROCEDURE DATE:  03/19/2019          INTERPRETATION:  CLINICAL INFORMATION: Right lower leg pain, status post   respiratory failure, right lower extremity pain    COMPARISON: Ultrasounddated 3/13/2019.    TECHNIQUE: Duplex sonography of the RIGHT LOWER extremity with color and   spectral Doppler, with and without compression.      FINDINGS:    There is normal compressibility of the right common femoral, femoral and   popliteal veins. No calf vein thrombosis is detected.    The contralateral common femoral vein is patent.    Doppler examination shows normal spontaneous and phasic flow.    IMPRESSION:     No evidence of right lower extremity deep venous thrombosis.    < end of copied text >

## 2019-03-19 NOTE — PROGRESS NOTE ADULT - ASSESSMENT
PROBLEM DIAGNOSES  Problem: Renal insufficiency  Assessment and Plan:  Chronic  Monitor labs.  Renal F/U  Continue meds  Avoid nephrotoxic drugs.  De La Cruz cath in place    Problem: Hypercapnic respiratory failure  Assessment and Plan:  Likely 2nd to pulmonary edema.    XR 3/18 suggestive of CHF.   Bi-pap QHS, on O2 NC.   Bronchodilators  Steroids  IV Lasix    DVT PPX  Follow up CXR

## 2019-03-19 NOTE — PROGRESS NOTE ADULT - ASSESSMENT
89 y/o F from [facility] w/ PMHx of GERD, anemia, colitis, rectal bleeding, chronic ankle swelling, presents to ED due to shortness of breath for 2 days.  1.Renal sono-p.  2.Psych f/u.  3.IV lasix.  4.CRI-Renal f/u.  5.DM-Insulin.  6.HTN-cont bp medication.  7.GI and DVT prophylaxis.

## 2019-03-19 NOTE — PROGRESS NOTE ADULT - SUBJECTIVE AND OBJECTIVE BOX
Problem List:  History of CKD.  DENEEN on admission associated with CHF and COPD exacerbation.  Severe diastolic dysfunction,  LAE and severe LVH.      PAST MEDICAL & SURGICAL HISTORY:  Rectal bleeding  H/O gastroesophageal reflux (GERD)  Colitis  Anemia  CHF (congestive heart failure)  No significant past surgical history      iodine (Unknown)  meperidine (Rash)  oxycodone (Other; Nausea)      MEDICATIONS  (STANDING):  amLODIPine   Tablet 10 milliGRAM(s) Oral daily  calcium acetate 667 milliGRAM(s) Oral four times a day with meals  chlorhexidine 4% Liquid 1 Application(s) Topical every 12 hours  docusate sodium 100 milliGRAM(s) Oral two times a day  furosemide   Injectable 40 milliGRAM(s) IV Push every 12 hours  heparin  Injectable 5000 Unit(s) SubCutaneous every 12 hours  hydrALAZINE 25 milliGRAM(s) Oral three times a day  nystatin Powder 1 Application(s) Topical every 12 hours  pantoprazole    Tablet 40 milliGRAM(s) Oral before breakfast  senna 2 Tablet(s) Oral at bedtime    MEDICATIONS  (PRN):  acetaminophen   Tablet .. 650 milliGRAM(s) Oral every 8 hours PRN Moderate Pain (4 - 6)  ALBUTerol    90 MICROgram(s) HFA Inhaler 2 Puff(s) Inhalation every 6 hours PRN Shortness of Breath and/or Wheezing  artificial  tears Solution 1 Drop(s) Both EYES every 6 hours PRN Dry Eyes                            8.9    4.11  )-----------( 148      ( 19 Mar 2019 04:28 )             29.8     03-19    142  |  109<H>  |  82<H>  ----------------------------<  85  4.4   |  30  |  1.72<H>    Ca    8.4      19 Mar 2019 04:39  Phos  3.7     03-19  Mg     2.1     03-19    TPro  6.2  /  Alb  2.6<L>  /  TBili  0.2  /  DBili  x   /  AST  13  /  ALT  26  /  AlkPhos  60  03-18    Creatinine, Serum: 1.98 mg/dL (03.13.19 @ 13:16)        Potassium, Random Urine: 20 mmol/L (03-15 @ 15:31)  Creatinine, Random Urine: 31 mg/dL (03-15 @ 15:31)  Osmolality, Random Urine: 350 mos/kg (03-14 @ 13:45)  Chloride, Random Urine: 90 mmol/L (03-14 @ 13:43)  Sodium, Random Urine: 77 mmol/L (03-14 @ 13:43)  Creatinine, Random Urine: 46 mg/dL (03-14 @ 13:43)      REVIEW OF SYSTEMS:  sleepy      VITALS:  T(F): 98.4 (03-19-19 @ 04:47), Max: 98.4 (03-18-19 @ 16:36)  HR: 70 (03-19-19 @ 04:47)  BP: 168/56 (03-19-19 @ 04:47)  RR: 17 (03-19-19 @ 04:47)  SpO2: 98% (03-19-19 @ 04:47)  Wt(kg): --    03-18 @ 07:01  -  03-19 @ 07:00  --------------------------------------------------------  IN: 300 mL / OUT: 2000 mL / NET: -1700 mL        PHYSICAL EXAM:  Constitutional:  no diaphoresis, no distress.  Neck: No JVD, no carotid bruit, supple, no adenopathy  Respiratory: reduced air entrance B/L, no wheezes, rales or rhonchi  Cardiovascular: S1, S2, RRR, no pericardial rub, no murmur  Abdomen: BS+, soft, no tenderness, no bruit  Pelvis: bladder nondistended  Extremities: No edema

## 2019-03-19 NOTE — PROGRESS NOTE ADULT - SUBJECTIVE AND OBJECTIVE BOX
CHIEF COMPLAINT:Patient is a 89y old  Female who presents with a chief complaint of shortness of breath .Pt appears comfortable.    	  REVIEW OF SYSTEMS:  CONSTITUTIONAL: No fever, weight loss, or fatigue  EYES: No eye pain, visual disturbances, or discharge  ENT:  No difficulty hearing, tinnitus, vertigo; No sinus or throat pain  NECK: No pain or stiffness  RESPIRATORY: No cough, wheezing, chills or hemoptysis; No Shortness of Breath  CARDIOVASCULAR: No chest pain, palpitations, passing out, dizziness, or leg swelling  GASTROINTESTINAL: No abdominal or epigastric pain. No nausea, vomiting, or hematemesis; No diarrhea or constipation. No melena or hematochezia.  GENITOURINARY: No dysuria, frequency, hematuria, or incontinence  NEUROLOGICAL: No headaches, memory loss, loss of strength, numbness, or tremors  SKIN: No itching, burning, rashes, or lesions   LYMPH Nodes: No enlarged glands  ENDOCRINE: No heat or cold intolerance; No hair loss  MUSCULOSKELETAL: No joint pain or swelling; No muscle, back, or extremity pain  PSYCHIATRIC: No depression, anxiety, mood swings, or difficulty sleeping  HEME/LYMPH: No easy bruising, or bleeding gums  ALLERGY AND IMMUNOLOGIC: No hives or eczema	    PHYSICAL EXAM:  T(C): 36.9 (03-19-19 @ 04:47), Max: 36.9 (03-18-19 @ 16:36)  HR: 70 (03-19-19 @ 04:47) (62 - 78)  BP: 168/56 (03-19-19 @ 04:47) (147/52 - 168/56)  RR: 17 (03-19-19 @ 04:47) (15 - 23)  SpO2: 98% (03-19-19 @ 04:47) (98% - 100%)  Wt(kg): --  I&O's Summary    18 Mar 2019 07:01  -  19 Mar 2019 07:00  --------------------------------------------------------  IN: 300 mL / OUT: 2000 mL / NET: -1700 mL        Appearance: Normal	  HEENT:   Normal oral mucosa, PERRL, EOMI	  Lymphatic: No lymphadenopathy  Cardiovascular: Normal S1 S2, No JVD, No murmurs, No edema  Respiratory: Lungs clear to auscultation	  Psychiatry: A & O x 3, Mood & affect appropriate  Gastrointestinal:  Soft, Non-tender, + BS	  Skin: No rashes, No ecchymoses, No cyanosis	  Neurologic: Non-focal  Extremities: Normal range of motion, No clubbing, cyanosis or edema  Vascular: Peripheral pulses palpable 2+ bilaterally    MEDICATIONS  (STANDING):  amLODIPine   Tablet 10 milliGRAM(s) Oral daily  calcium acetate 667 milliGRAM(s) Oral four times a day with meals  chlorhexidine 4% Liquid 1 Application(s) Topical every 12 hours  docusate sodium 100 milliGRAM(s) Oral two times a day  furosemide   Injectable 40 milliGRAM(s) IV Push every 12 hours  heparin  Injectable 5000 Unit(s) SubCutaneous every 12 hours  hydrALAZINE 25 milliGRAM(s) Oral three times a day  nystatin Powder 1 Application(s) Topical every 12 hours  pantoprazole    Tablet 40 milliGRAM(s) Oral before breakfast  senna 2 Tablet(s) Oral at bedtime        	  LABS:	 	                         8.9    4.11  )-----------( 148      ( 19 Mar 2019 04:28 )             29.8     03-19    142  |  109<H>  |  82<H>  ----------------------------<  85  4.4   |  30  |  1.72<H>    Ca    8.4      19 Mar 2019 04:39  Phos  3.7     03-19  Mg     2.1     03-19    TPro  6.2  /  Alb  2.6<L>  /  TBili  0.2  /  DBili  x   /  AST  13  /  ALT  26  /  AlkPhos  60  03-18    proBNP: Serum Pro-Brain Natriuretic Peptide: 23448 pg/mL (03-18 @ 06:40)  Serum Pro-Brain Natriuretic Peptide: 8592 pg/mL (03-13 @ 13:16)    Lipid Profile: Cholesterol 146  LDL 38    TG 25    HgA1c: Hemoglobin A1C, Whole Blood: 4.8 % (03-14 @ 10:39)    TSH: Thyroid Stimulating Hormone, Serum: 3.74 uU/mL (03-14 @ 06:39)

## 2019-03-19 NOTE — PROGRESS NOTE ADULT - ASSESSMENT
89 y/o F from [facility] w/ PMHx of GERD, HTN, anemia, colitis, rectal bleeding, chronic ankle swelling, presents to ED due to shortness of breath for 2 days admitted for management of hypercapnic resp failure likely secondary to pulmonary edema and placed on NIV BIPAP.   3/18/19  Patient has made herself a DNR with trial of intubation if necessary.

## 2019-03-19 NOTE — CHART NOTE - NSCHARTNOTEFT_GEN_A_CORE
Palliative to sign off. Please re-consult as needed. MOLST completed and placed in chart as per patient's wishes.

## 2019-03-19 NOTE — PROGRESS NOTE ADULT - ASSESSMENT
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  xr chest with CHF and pleural effusion, continue diuretics and fluid restrictio    Anemia work up in progress.  COPD.    Follow renal US.

## 2019-03-20 DIAGNOSIS — I50.9 HEART FAILURE, UNSPECIFIED: ICD-10-CM

## 2019-03-20 DIAGNOSIS — Z29.9 ENCOUNTER FOR PROPHYLACTIC MEASURES, UNSPECIFIED: ICD-10-CM

## 2019-03-20 LAB
ALBUMIN SERPL ELPH-MCNC: 2.5 G/DL — LOW (ref 3.5–5)
ALP SERPL-CCNC: 49 U/L — SIGNIFICANT CHANGE UP (ref 40–120)
ALT FLD-CCNC: 17 U/L DA — SIGNIFICANT CHANGE UP (ref 10–60)
ANION GAP SERPL CALC-SCNC: 3 MMOL/L — LOW (ref 5–17)
APPEARANCE UR: CLEAR — SIGNIFICANT CHANGE UP
AST SERPL-CCNC: 10 U/L — SIGNIFICANT CHANGE UP (ref 10–40)
BILIRUB SERPL-MCNC: 0.3 MG/DL — SIGNIFICANT CHANGE UP (ref 0.2–1.2)
BILIRUB UR-MCNC: NEGATIVE — SIGNIFICANT CHANGE UP
BUN SERPL-MCNC: 78 MG/DL — HIGH (ref 7–18)
CALCIUM SERPL-MCNC: 8.5 MG/DL — SIGNIFICANT CHANGE UP (ref 8.4–10.5)
CHLORIDE SERPL-SCNC: 106 MMOL/L — SIGNIFICANT CHANGE UP (ref 96–108)
CO2 SERPL-SCNC: 32 MMOL/L — HIGH (ref 22–31)
COLOR SPEC: YELLOW — SIGNIFICANT CHANGE UP
CREAT SERPL-MCNC: 1.69 MG/DL — HIGH (ref 0.5–1.3)
DIFF PNL FLD: NEGATIVE — SIGNIFICANT CHANGE UP
GLUCOSE SERPL-MCNC: 84 MG/DL — SIGNIFICANT CHANGE UP (ref 70–99)
GLUCOSE UR QL: NEGATIVE — SIGNIFICANT CHANGE UP
HCT VFR BLD CALC: 29 % — LOW (ref 34.5–45)
HGB BLD-MCNC: 8.7 G/DL — LOW (ref 11.5–15.5)
KETONES UR-MCNC: NEGATIVE — SIGNIFICANT CHANGE UP
LEUKOCYTE ESTERASE UR-ACNC: NEGATIVE — SIGNIFICANT CHANGE UP
MCHC RBC-ENTMCNC: 30 GM/DL — LOW (ref 32–36)
MCHC RBC-ENTMCNC: 30 PG — SIGNIFICANT CHANGE UP (ref 27–34)
MCV RBC AUTO: 100 FL — SIGNIFICANT CHANGE UP (ref 80–100)
NITRITE UR-MCNC: NEGATIVE — SIGNIFICANT CHANGE UP
NRBC # BLD: 0 /100 WBCS — SIGNIFICANT CHANGE UP (ref 0–0)
PH UR: 5 — SIGNIFICANT CHANGE UP (ref 5–8)
PHOSPHATE SERPL-MCNC: 3.3 MG/DL — SIGNIFICANT CHANGE UP (ref 2.5–4.5)
PLATELET # BLD AUTO: 130 K/UL — LOW (ref 150–400)
POTASSIUM SERPL-MCNC: 4.5 MMOL/L — SIGNIFICANT CHANGE UP (ref 3.5–5.3)
POTASSIUM SERPL-SCNC: 4.5 MMOL/L — SIGNIFICANT CHANGE UP (ref 3.5–5.3)
PROT SERPL-MCNC: 6.4 G/DL — SIGNIFICANT CHANGE UP (ref 6–8.3)
PROT UR-MCNC: 30 MG/DL
RBC # BLD: 2.9 M/UL — LOW (ref 3.8–5.2)
RBC # FLD: 15.4 % — HIGH (ref 10.3–14.5)
SODIUM SERPL-SCNC: 141 MMOL/L — SIGNIFICANT CHANGE UP (ref 135–145)
SP GR SPEC: 1.01 — SIGNIFICANT CHANGE UP (ref 1.01–1.02)
UROBILINOGEN FLD QL: NEGATIVE — SIGNIFICANT CHANGE UP
WBC # BLD: 5.55 K/UL — SIGNIFICANT CHANGE UP (ref 3.8–10.5)
WBC # FLD AUTO: 5.55 K/UL — SIGNIFICANT CHANGE UP (ref 3.8–10.5)

## 2019-03-20 PROCEDURE — 71045 X-RAY EXAM CHEST 1 VIEW: CPT | Mod: 26

## 2019-03-20 RX ORDER — ACETAMINOPHEN WITH CODEINE 300MG-30MG
1 TABLET ORAL EVERY 4 HOURS
Qty: 0 | Refills: 0 | Status: DISCONTINUED | OUTPATIENT
Start: 2019-03-20 | End: 2019-03-26

## 2019-03-20 RX ADMIN — HEPARIN SODIUM 5000 UNIT(S): 5000 INJECTION INTRAVENOUS; SUBCUTANEOUS at 17:40

## 2019-03-20 RX ADMIN — Medication 25 MILLIGRAM(S): at 07:09

## 2019-03-20 RX ADMIN — Medication 100 MILLIGRAM(S): at 17:40

## 2019-03-20 RX ADMIN — Medication 650 MILLIGRAM(S): at 15:40

## 2019-03-20 RX ADMIN — Medication 25 MILLIGRAM(S): at 22:00

## 2019-03-20 RX ADMIN — AMLODIPINE BESYLATE 10 MILLIGRAM(S): 2.5 TABLET ORAL at 07:09

## 2019-03-20 RX ADMIN — Medication 650 MILLIGRAM(S): at 14:50

## 2019-03-20 RX ADMIN — Medication 650 MILLIGRAM(S): at 22:55

## 2019-03-20 RX ADMIN — Medication 40 MILLIGRAM(S): at 17:40

## 2019-03-20 RX ADMIN — HEPARIN SODIUM 5000 UNIT(S): 5000 INJECTION INTRAVENOUS; SUBCUTANEOUS at 07:08

## 2019-03-20 RX ADMIN — Medication 667 MILLIGRAM(S): at 09:28

## 2019-03-20 RX ADMIN — NYSTATIN CREAM 1 APPLICATION(S): 100000 CREAM TOPICAL at 07:09

## 2019-03-20 RX ADMIN — Medication 40 MILLIGRAM(S): at 07:08

## 2019-03-20 RX ADMIN — SENNA PLUS 2 TABLET(S): 8.6 TABLET ORAL at 22:01

## 2019-03-20 RX ADMIN — Medication 100 MILLIGRAM(S): at 07:09

## 2019-03-20 RX ADMIN — CHLORHEXIDINE GLUCONATE 1 APPLICATION(S): 213 SOLUTION TOPICAL at 17:39

## 2019-03-20 RX ADMIN — Medication 667 MILLIGRAM(S): at 17:40

## 2019-03-20 RX ADMIN — Medication 1 TABLET(S): at 09:29

## 2019-03-20 RX ADMIN — Medication 1 TABLET(S): at 10:20

## 2019-03-20 RX ADMIN — Medication 25 MILLIGRAM(S): at 13:52

## 2019-03-20 RX ADMIN — Medication 667 MILLIGRAM(S): at 12:13

## 2019-03-20 RX ADMIN — Medication 650 MILLIGRAM(S): at 23:55

## 2019-03-20 RX ADMIN — PANTOPRAZOLE SODIUM 40 MILLIGRAM(S): 20 TABLET, DELAYED RELEASE ORAL at 07:09

## 2019-03-20 RX ADMIN — NYSTATIN CREAM 1 APPLICATION(S): 100000 CREAM TOPICAL at 17:40

## 2019-03-20 RX ADMIN — Medication 667 MILLIGRAM(S): at 22:01

## 2019-03-20 RX ADMIN — CHLORHEXIDINE GLUCONATE 1 APPLICATION(S): 213 SOLUTION TOPICAL at 07:09

## 2019-03-20 NOTE — PROGRESS NOTE ADULT - ASSESSMENT
89 y/o F from [facility] w/ PMHx of GERD, anemia, colitis, rectal bleeding, chronic ankle swelling, presents to ED due to shortness of breath.  1.PT.  2.Psych f/u.  3.IV lasix.  4.CRI-Renal f/u.  5.DM-Insulin.  6.HTN-cont bp medication.  7.GI and DVT prophylaxis.

## 2019-03-20 NOTE — PROGRESS NOTE ADULT - SUBJECTIVE AND OBJECTIVE BOX
Pt is awake, alert, lying in bed in NAD. On BIPAP QHS. O2 NC in place.     INTERVAL HPI/OVERNIGHT EVENTS:      VITAL SIGNS:  T(F): 98.9 (03-20-19 @ 05:00)  HR: 74 (03-20-19 @ 05:00)  BP: 161/46 (03-20-19 @ 05:00)  RR: 16 (03-20-19 @ 05:00)  SpO2: 100% (03-20-19 @ 05:00)  Wt(kg): --  I&O's Detail    19 Mar 2019 07:01  -  20 Mar 2019 07:00  --------------------------------------------------------  IN:  Total IN: 0 mL    OUT:    Indwelling Catheter - Urethral: 2500 mL  Total OUT: 2500 mL    Total NET: -2500 mL      20 Mar 2019 07:01  -  20 Mar 2019 10:56  --------------------------------------------------------  IN:  Total IN: 0 mL    OUT:    Indwelling Catheter - Urethral: 1090 mL  Total OUT: 1090 mL    Total NET: -1090 mL              REVIEW OF SYSTEMS:    CONSTITUTIONAL:  No fevers, chills, sweats    HEENT:  Eyes:  No diplopia or blurred vision. ENT:  No earache, sore throat or runny nose.    CARDIOVASCULAR:  No pressure, squeezing, tightness, or heaviness about the chest; no palpitations.    RESPIRATORY:  Per HPI    GASTROINTESTINAL:  No abdominal pain, nausea, vomiting or diarrhea.    GENITOURINARY:  No dysuria, frequency or urgency.    NEUROLOGIC:  No paresthesias, fasciculations, seizures or weakness.    PSYCHIATRIC:  No disorder of thought or mood.      PHYSICAL EXAM:    Constitutional: Well developed and nourished  Eyes:Perrla  ENMT: normal  Neck:supple  Respiratory: good air entry  Cardiovascular: S1 S2 regular  Gastrointestinal: Soft, Non tender  Extremities: No edema  Vascular:normal  Neurological:Awake, alert,Ox3  Musculoskeletal:Normal      MEDICATIONS  (STANDING):  amLODIPine   Tablet 10 milliGRAM(s) Oral daily  calcium acetate 667 milliGRAM(s) Oral four times a day with meals  chlorhexidine 4% Liquid 1 Application(s) Topical every 12 hours  docusate sodium 100 milliGRAM(s) Oral two times a day  furosemide   Injectable 40 milliGRAM(s) IV Push every 12 hours  heparin  Injectable 5000 Unit(s) SubCutaneous every 12 hours  hydrALAZINE 25 milliGRAM(s) Oral three times a day  nystatin Powder 1 Application(s) Topical every 12 hours  pantoprazole    Tablet 40 milliGRAM(s) Oral before breakfast  senna 2 Tablet(s) Oral at bedtime    MEDICATIONS  (PRN):  acetaminophen   Tablet .. 650 milliGRAM(s) Oral every 8 hours PRN Moderate Pain (4 - 6)  acetaminophen 300 mG/codeine 30 mG 1 Tablet(s) Oral every 4 hours PRN Severe Pain (7 - 10)  ALBUTerol    90 MICROgram(s) HFA Inhaler 2 Puff(s) Inhalation every 6 hours PRN Shortness of Breath and/or Wheezing  artificial  tears Solution 1 Drop(s) Both EYES every 6 hours PRN Dry Eyes      Allergies    iodine (Unknown)  meperidine (Rash)  oxycodone (Other; Nausea)    Intolerances        LABS:                        8.7    5.55  )-----------( 130      ( 20 Mar 2019 07:02 )             29.0     03-20    141  |  106  |  78<H>  ----------------------------<  84  4.5   |  32<H>  |  1.69<H>    Ca    8.5      20 Mar 2019 07:02  Phos  3.3     03-20  Mg     2.1     03-19    TPro  6.4  /  Alb  2.5<L>  /  TBili  0.3  /  DBili  x   /  AST  10  /  ALT  17  /  AlkPhos  49  03-20              CAPILLARY BLOOD GLUCOSE      POCT Blood Glucose.: 135 mg/dL (20 Mar 2019 00:17)    pro-bnp 83662 03-18 @ 06:40     d-dimer --  03-18 @ 06:40  pro-bnp 8592 03-13 @ 13:16     d-dimer --  03-13 @ 13:16      RADIOLOGY & ADDITIONAL TESTS:  < from: US Renal (03.19.19 @ 12:23) >  Impression: No hydronephrosis.    Both kidneys are hyperechoic for which clinical correlation with   intrinsic medical renal disease is recommended.    Atrophic left kidney.    Bilateral pleural effusions.      < end of copied text >    CXR:  < from: Xray Chest 1 View- PORTABLE-Routine (03.18.19 @ 10:27) >  IMPRESSION: Findings consistent with continued CHF. Clinical correlation   and follow-up suggested.    < end of copied text >    Ct scan chest:    ekg;    echo:

## 2019-03-20 NOTE — PROGRESS NOTE ADULT - ASSESSMENT
89 y/o F from [facility] w/ PMHx of GERD, HTN, anemia, colitis, rectal bleeding, chronic ankle swelling, presents to ED due to shortness of breath for 2 days admitted for management of hypercapnic resp failure likely secondary to pulmonary edema and placed on NIV BIPAP.   3/18/19  Patient has made herself a DNR with trial of intubation if necessary.  3/20/19  Patient refusing to use BIPAP.

## 2019-03-20 NOTE — PROGRESS NOTE ADULT - ASSESSMENT
89 y/o F from [facility] w/ PMHx of GERD, HTN, anemia, colitis, rectal bleeding, chronic ankle swelling, presents to ED due to shortness of breath for 2 days admitted for management of hypercapnic resp failure likely secondary to pulmonary edema and placed on NIV BIPAP.   3/18/19  Patient has made herself a DNR with trial of intubation if necessary. 89 y/o F from [facility] w/ PMHx of GERD, HTN, anemia, colitis, rectal bleeding, chronic ankle swelling, presents to ED due to shortness of breath for 2 days admitted for management of hypercapnic resp failure likely secondary to pulmonary edema and placed on NIV BIPAP.   3/18/19  Patient has made herself a DNR with trial of intubation if necessary.  3/20/19  Patient refusing to use BIPAP.

## 2019-03-20 NOTE — PROGRESS NOTE ADULT - SUBJECTIVE AND OBJECTIVE BOX
CHIEF COMPLAINT:Patient is a 89y old  Female who presents with a chief complaint of shortness of breath.Pt appears comfortable.    	  REVIEW OF SYSTEMS:  CONSTITUTIONAL: No fever, weight loss, or fatigue  EYES: No eye pain, visual disturbances, or discharge  ENT:  No difficulty hearing, tinnitus, vertigo; No sinus or throat pain  NECK: No pain or stiffness  RESPIRATORY: No cough, wheezing, chills or hemoptysis; No Shortness of Breath  CARDIOVASCULAR: No chest pain, palpitations, passing out, dizziness, or leg swelling  GASTROINTESTINAL: No abdominal or epigastric pain. No nausea, vomiting, or hematemesis; No diarrhea or constipation. No melena or hematochezia.  GENITOURINARY: No dysuria, frequency, hematuria, or incontinence  NEUROLOGICAL: No headaches, memory loss, loss of strength, numbness, or tremors  SKIN: No itching, burning, rashes, or lesions   LYMPH Nodes: No enlarged glands  ENDOCRINE: No heat or cold intolerance; No hair loss  MUSCULOSKELETAL: No joint pain or swelling; No muscle, back, or extremity pain  PSYCHIATRIC: No depression, anxiety, mood swings, or difficulty sleeping  HEME/LYMPH: No easy bruising, or bleeding gums  ALLERGY AND IMMUNOLOGIC: No hives or eczema	      PHYSICAL EXAM:  T(C): 37.8 (03-20-19 @ 13:21), Max: 37.8 (03-20-19 @ 13:21)  HR: 76 (03-20-19 @ 13:21) (74 - 76)  BP: 149/54 (03-20-19 @ 13:21) (149/54 - 164/46)  RR: 19 (03-20-19 @ 13:21) (16 - 19)  SpO2: 99% (03-20-19 @ 13:21) (99% - 100%)  Wt(kg): --  I&O's Summary    19 Mar 2019 07:01  -  20 Mar 2019 07:00  --------------------------------------------------------  IN: 0 mL / OUT: 2500 mL / NET: -2500 mL    20 Mar 2019 07:01  -  20 Mar 2019 13:31  --------------------------------------------------------  IN: 0 mL / OUT: 1090 mL / NET: -1090 mL        Appearance: Normal	  HEENT:   Normal oral mucosa, PERRL, EOMI	  Lymphatic: No lymphadenopathy  Cardiovascular: Normal S1 S2, No JVD, No murmurs, No edema  Respiratory: B/L [facility]  Psychiatry: A & O x 3, Mood & affect appropriate  Gastrointestinal:  Soft, Non-tender, + BS	  Skin: No rashes, No ecchymoses, No cyanosis	  Neurologic: Non-focal  Extremities: Normal range of motion, No clubbing, cyanosis or edema  Vascular: Peripheral pulses palpable 2+ bilaterally    MEDICATIONS  (STANDING):  amLODIPine   Tablet 10 milliGRAM(s) Oral daily  calcium acetate 667 milliGRAM(s) Oral four times a day with meals  chlorhexidine 4% Liquid 1 Application(s) Topical every 12 hours  docusate sodium 100 milliGRAM(s) Oral two times a day  furosemide   Injectable 40 milliGRAM(s) IV Push every 12 hours  heparin  Injectable 5000 Unit(s) SubCutaneous every 12 hours  hydrALAZINE 25 milliGRAM(s) Oral three times a day  nystatin Powder 1 Application(s) Topical every 12 hours  pantoprazole    Tablet 40 milliGRAM(s) Oral before breakfast  senna 2 Tablet(s) Oral at bedtime    LABS:	 	                          8.7    5.55  )-----------( 130      ( 20 Mar 2019 07:02 )             29.0     03-20    141  |  106  |  78<H>  ----------------------------<  84  4.5   |  32<H>  |  1.69<H>    Ca    8.5      20 Mar 2019 07:02  Phos  3.3     03-20  Mg     2.1     03-19    TPro  6.4  /  Alb  2.5<L>  /  TBili  0.3  /  DBili  x   /  AST  10  /  ALT  17  /  AlkPhos  49  03-20    proBNP: Serum Pro-Brain Natriuretic Peptide: 38036 pg/mL (03-18 @ 06:40)  Serum Pro-Brain Natriuretic Peptide: 8592 pg/mL (03-13 @ 13:16)    Lipid Profile: Cholesterol 146  LDL 38    TG 25    HgA1c: Hemoglobin A1C, Whole Blood: 4.8 % (03-14 @ 10:39)    TSH: Thyroid Stimulating Hormone, Serum: 3.74 uU/mL (03-14 @ 06:39)      EXAM:  US KIDNEY(S)                            PROCEDURE DATE:  03/19/2019          INTERPRETATION:  Renal ultrasound    Indication: Chronic kidney disease.    Renal ultrasound is performed without a previous examination for   comparison. The rightkidney measures 9.4 cm in length which is within   normal limits in size. The left kidney measures 7.8 cm in length which is   atrophic. No evidence for hydronephrosis, cyst, calculus or solid mass in   either kidney. Both kidneys are hyperechoic forwhich clinical   correlation with intrinsic medical renal disease is recommended.    De La Cruz catheter in the urinary bladder which is underdistended, rendering   evaluation difficult.    Bilateral pleural effusions are noted.    Impression: No hydronephrosis.    Both kidneys are hyperechoic for which clinical correlation with   intrinsic medical renal disease is recommended.    Atrophic left kidney.    Bilateral pleural effusions.        EXAM:  US DPLX LWR EXT VEINS LTD RT                            PROCEDURE DATE:  03/19/2019          INTERPRETATION:  CLINICAL INFORMATION: Right lower leg pain, status post   respiratory failure, right lower extremity pain    COMPARISON: Ultrasounddated 3/13/2019.    TECHNIQUE: Duplex sonography of the RIGHT LOWER extremity with color and   spectral Doppler, with and without compression.      FINDINGS:    There is normal compressibility of the right common femoral, femoral and   popliteal veins. No calf vein thrombosis is detected.    The contralateral common femoral vein is patent.    Doppler examination shows normal spontaneous and phasic flow.    IMPRESSION:     No evidence of right lower extremity deep venous thrombosis.

## 2019-03-20 NOTE — PROGRESS NOTE ADULT - SUBJECTIVE AND OBJECTIVE BOX
DNR [x ]   DNI  [  ]    INTERVAL HPI/OVERNIGHT EVENTS: ***No overnight events.    PRESSORS: [ ] YES [x ] NO  WHICH:    ANTIBIOTICS:                  DATE STARTED:      Cardiovascular:  Heart Failure  Acute   Acute on Chronic  Chronic       amLODIPine   Tablet 10 milliGRAM(s) Oral daily  furosemide   Injectable 40 milliGRAM(s) IV Push every 12 hours  hydrALAZINE 25 milliGRAM(s) Oral three times a day    Pulmonary:  ALBUTerol    90 MICROgram(s) HFA Inhaler 2 Puff(s) Inhalation every 6 hours PRN    Hematalogic:  heparin  Injectable 5000 Unit(s) SubCutaneous every 12 hours    Other:  acetaminophen   Tablet .. 650 milliGRAM(s) Oral every 8 hours PRN  acetaminophen 300 mG/codeine 30 mG 1 Tablet(s) Oral every 4 hours PRN  artificial  tears Solution 1 Drop(s) Both EYES every 6 hours PRN  calcium acetate 667 milliGRAM(s) Oral four times a day with meals  chlorhexidine 4% Liquid 1 Application(s) Topical every 12 hours  docusate sodium 100 milliGRAM(s) Oral two times a day  nystatin Powder 1 Application(s) Topical every 12 hours  pantoprazole    Tablet 40 milliGRAM(s) Oral before breakfast  senna 2 Tablet(s) Oral at bedtime    acetaminophen   Tablet .. 650 milliGRAM(s) Oral every 8 hours PRN  acetaminophen 300 mG/codeine 30 mG 1 Tablet(s) Oral every 4 hours PRN  ALBUTerol    90 MICROgram(s) HFA Inhaler 2 Puff(s) Inhalation every 6 hours PRN  amLODIPine   Tablet 10 milliGRAM(s) Oral daily  artificial  tears Solution 1 Drop(s) Both EYES every 6 hours PRN  calcium acetate 667 milliGRAM(s) Oral four times a day with meals  chlorhexidine 4% Liquid 1 Application(s) Topical every 12 hours  docusate sodium 100 milliGRAM(s) Oral two times a day  furosemide   Injectable 40 milliGRAM(s) IV Push every 12 hours  heparin  Injectable 5000 Unit(s) SubCutaneous every 12 hours  hydrALAZINE 25 milliGRAM(s) Oral three times a day  nystatin Powder 1 Application(s) Topical every 12 hours  pantoprazole    Tablet 40 milliGRAM(s) Oral before breakfast  senna 2 Tablet(s) Oral at bedtime    Drug Dosing Weight  Height (cm): 152.4 (13 Mar 2019 11:40)  Weight (kg): 58.5 (13 Mar 2019 11:40)  BMI (kg/m2): 25.2 (13 Mar 2019 11:40)  BSA (m2): 1.55 (13 Mar 2019 11:40)    CENTRAL LINE: [ ] YES [x ] NO  LOCATION:   DATE INSERTED:  REMOVE: [ ] YES [ ] NO  EXPLAIN:    GUZMAN: [ x] YES [ ] NO    DATE INSERTED:  REMOVE:  [ ] YES [x ] NO  EXPLAIN:  Urinary retention    A-LINE:  [ ] YES [x ] NO  LOCATION:   DATE INSERTED:  REMOVE:  [ ] YES [ ] NO  EXPLAIN:    PAST MEDICAL & SURGICAL HISTORY:  Rectal bleeding  H/O gastroesophageal reflux (GERD)  Colitis  Anemia  CHF (congestive heart failure)  No significant past surgical history              03-19 @ 07:01  -  03-20 @ 07:00  --------------------------------------------------------  IN: 0 mL / OUT: 2500 mL / NET: -2500 mL            PHYSICAL EXAM:    GENERAL: NAD, well-groomed, well-developed  HEAD:  Atraumatic, Normocephalic  EYES: EOMI, PERRLA, conjunctiva and sclera clear  ENMT: No tonsillar erythema, exudates  NECK: Supple, No JVD  NERVOUS SYSTEM:  Alert & Oriented X3, No deficits  CHEST/LUNG: Diminished breath sounds bilateral bases  HEART: Regular rate and rhythm; No murmurs, rubs, or gallops  ABDOMEN: Soft, Nontender, Nondistended; Bowel sounds present  EXTREMITIES:  2+ Peripheral Pulses, No clubbing, cyanosis, or edema  LYMPH: No lymphadenopathy noted  SKIN: No rashes or lesions  +Kyphoscoliosis    LABS:  CBC Full  -  ( 20 Mar 2019 07:02 )  WBC Count : 5.55 K/uL  Hemoglobin : 8.7 g/dL  Hematocrit : 29.0 %  Platelet Count - Automated : 130 K/uL  Mean Cell Volume : 100.0 fl  Mean Cell Hemoglobin : 30.0 pg  Mean Cell Hemoglobin Concentration : 30.0 gm/dL  Auto Neutrophil # : x  Auto Lymphocyte # : x  Auto Monocyte # : x  Auto Eosinophil # : x  Auto Basophil # : x  Auto Neutrophil % : x  Auto Lymphocyte % : x  Auto Monocyte % : x  Auto Eosinophil % : x  Auto Basophil % : x    03-20    141  |  106  |  78<H>  ----------------------------<  84  4.5   |  32<H>  |  1.69<H>    Ca    8.5      20 Mar 2019 07:02  Phos  3.3     03-20  Mg     2.1     03-19    TPro  6.4  /  Alb  2.5<L>  /  TBili  0.3  /  DBili  x   /  AST  10  /  ALT  17  /  AlkPhos  49  03-20              [ x ]  DVT Prophylaxis  [  ]  Nutrition, Brand, Rate         Goal Rate         Abdominal Nutritional Status -  Malnutrition   Cachexia          RADIOLOGY & ADDITIONAL STUDIES:  ***  < from: Xray Chest 1 View- PORTABLE-Routine (03.18.19 @ 10:27) >  FINDINGS: Since prior evaluation, no significant interval change is   identified. Heart size cannot quantitated on this portable evaluation. No   superior mediastinal widening is identified. There are patchy airspace   opacities identified bilaterally with bilateral pleural effusions. There   is no evidence for pneumothorax. No mediastinal shift is noted.   Degenerative change of the thoracic spine noted.    IMPRESSION: Findings consistent with continued CHF. Clinical correlation   and follow-up suggested.    < end of copied text >    [  ] Goals of Care Discussion with Family/Proxy/Other           Elements of Conversation Discussed: Patient/Family understanding of current illness   Advanced Directives                                                                       Prognosis  Treatment Options  Care Aligned with patient's wishes                                             TIME SPENT: 35 minutes DNR [x ]   DNI  [  ]    INTERVAL HPI/OVERNIGHT EVENTS: ***No overnight events.    PRESSORS: [ ] YES [x ] NO  WHICH:    ANTIBIOTICS:                  DATE STARTED:      Cardiovascular:  Heart Failure  Acute   Acute on Chronic  Chronic       amLODIPine   Tablet 10 milliGRAM(s) Oral daily  furosemide   Injectable 40 milliGRAM(s) IV Push every 12 hours  hydrALAZINE 25 milliGRAM(s) Oral three times a day    Pulmonary:  ALBUTerol    90 MICROgram(s) HFA Inhaler 2 Puff(s) Inhalation every 6 hours PRN    Hematalogic:  heparin  Injectable 5000 Unit(s) SubCutaneous every 12 hours    Other:  acetaminophen   Tablet .. 650 milliGRAM(s) Oral every 8 hours PRN  acetaminophen 300 mG/codeine 30 mG 1 Tablet(s) Oral every 4 hours PRN  artificial  tears Solution 1 Drop(s) Both EYES every 6 hours PRN  calcium acetate 667 milliGRAM(s) Oral four times a day with meals  chlorhexidine 4% Liquid 1 Application(s) Topical every 12 hours  docusate sodium 100 milliGRAM(s) Oral two times a day  nystatin Powder 1 Application(s) Topical every 12 hours  pantoprazole    Tablet 40 milliGRAM(s) Oral before breakfast  senna 2 Tablet(s) Oral at bedtime    acetaminophen   Tablet .. 650 milliGRAM(s) Oral every 8 hours PRN  acetaminophen 300 mG/codeine 30 mG 1 Tablet(s) Oral every 4 hours PRN  ALBUTerol    90 MICROgram(s) HFA Inhaler 2 Puff(s) Inhalation every 6 hours PRN  amLODIPine   Tablet 10 milliGRAM(s) Oral daily  artificial  tears Solution 1 Drop(s) Both EYES every 6 hours PRN  calcium acetate 667 milliGRAM(s) Oral four times a day with meals  chlorhexidine 4% Liquid 1 Application(s) Topical every 12 hours  docusate sodium 100 milliGRAM(s) Oral two times a day  furosemide   Injectable 40 milliGRAM(s) IV Push every 12 hours  heparin  Injectable 5000 Unit(s) SubCutaneous every 12 hours  hydrALAZINE 25 milliGRAM(s) Oral three times a day  nystatin Powder 1 Application(s) Topical every 12 hours  pantoprazole    Tablet 40 milliGRAM(s) Oral before breakfast  senna 2 Tablet(s) Oral at bedtime    Drug Dosing Weight  Height (cm): 152.4 (13 Mar 2019 11:40)  Weight (kg): 58.5 (13 Mar 2019 11:40)  BMI (kg/m2): 25.2 (13 Mar 2019 11:40)  BSA (m2): 1.55 (13 Mar 2019 11:40)    CENTRAL LINE: [ ] YES [x ] NO  LOCATION:   DATE INSERTED:  REMOVE: [ ] YES [ ] NO  EXPLAIN:    GUZMAN: [ x] YES [ ] NO    DATE INSERTED:  REMOVE:  [ ] YES [x ] NO  EXPLAIN:  Urinary retention    A-LINE:  [ ] YES [x ] NO  LOCATION:   DATE INSERTED:  REMOVE:  [ ] YES [ ] NO  EXPLAIN:    PAST MEDICAL & SURGICAL HISTORY:  Rectal bleeding  H/O gastroesophageal reflux (GERD)  Colitis  Anemia  CHF (congestive heart failure)  No significant past surgical history              03-19 @ 07:01  -  03-20 @ 07:00  --------------------------------------------------------  IN: 0 mL / OUT: 2500 mL / NET: -2500 mL            PHYSICAL EXAM:    GENERAL: NAD, well-groomed, well-developed  HEAD:  Atraumatic, Normocephalic  EYES: EOMI, PERRLA, conjunctiva and sclera clear  ENMT: No tonsillar erythema, exudates  NECK: Supple, No JVD  NERVOUS SYSTEM:  Alert & Oriented X3, No deficits  CHEST/LUNG: Diminished breath sounds bilateral bases, +crackles left base  HEART: Regular rate and rhythm; No murmurs, rubs, or gallops  ABDOMEN: Soft, Nontender, Nondistended; Bowel sounds present  EXTREMITIES:  2+ Peripheral Pulses, No clubbing, cyanosis, or edema  LYMPH: No lymphadenopathy noted  SKIN: No rashes or lesions  +Severe Kyphoscoliosis    LABS:  CBC Full  -  ( 20 Mar 2019 07:02 )  WBC Count : 5.55 K/uL  Hemoglobin : 8.7 g/dL  Hematocrit : 29.0 %  Platelet Count - Automated : 130 K/uL  Mean Cell Volume : 100.0 fl  Mean Cell Hemoglobin : 30.0 pg  Mean Cell Hemoglobin Concentration : 30.0 gm/dL  Auto Neutrophil # : x  Auto Lymphocyte # : x  Auto Monocyte # : x  Auto Eosinophil # : x  Auto Basophil # : x  Auto Neutrophil % : x  Auto Lymphocyte % : x  Auto Monocyte % : x  Auto Eosinophil % : x  Auto Basophil % : x    03-20    141  |  106  |  78<H>  ----------------------------<  84  4.5   |  32<H>  |  1.69<H>    Ca    8.5      20 Mar 2019 07:02  Phos  3.3     03-20  Mg     2.1     03-19    TPro  6.4  /  Alb  2.5<L>  /  TBili  0.3  /  DBili  x   /  AST  10  /  ALT  17  /  AlkPhos  49  03-20              [ x ]  DVT Prophylaxis  [  ]  Nutrition, Brand, Rate         Goal Rate         Abdominal Nutritional Status -  Malnutrition   Cachexia          RADIOLOGY & ADDITIONAL STUDIES:  ***  < from: Xray Chest 1 View- PORTABLE-Routine (03.18.19 @ 10:27) >  FINDINGS: Since prior evaluation, no significant interval change is   identified. Heart size cannot quantitated on this portable evaluation. No   superior mediastinal widening is identified. There are patchy airspace   opacities identified bilaterally with bilateral pleural effusions. There   is no evidence for pneumothorax. No mediastinal shift is noted.   Degenerative change of the thoracic spine noted.    IMPRESSION: Findings consistent with continued CHF. Clinical correlation   and follow-up suggested.    < end of copied text >    [  ] Goals of Care Discussion with Family/Proxy/Other           Elements of Conversation Discussed: Patient/Family understanding of current illness   Advanced Directives                                                                       Prognosis  Treatment Options  Care Aligned with patient's wishes                                             TIME SPENT: 35 minutes

## 2019-03-20 NOTE — PROGRESS NOTE ADULT - SUBJECTIVE AND OBJECTIVE BOX
CHIEF COMPLAINT:Patient is a 89y old  Female who presents with a chief complaint of shortness of breath (20 Mar 2019 14:29)    	  REVIEW OF SYSTEMS:  CONSTITUTIONAL: No fever, weight loss, or fatigue  EYES: No eye pain, visual disturbances, or discharge  ENMT:  No difficulty hearing, tinnitus, vertigo; No sinus or throat pain  NECK: No pain or stiffness  RESPIRATORY: No cough, wheezing, chills or hemoptysis; No Shortness of Breath  CARDIOVASCULAR: No chest pain, palpitations, passing out, dizziness, or leg swelling  GASTROINTESTINAL: No abdominal or epigastric pain. No nausea, vomiting, or hematemesis; No diarrhea or constipation. No melena or hematochezia.  GENITOURINARY: No dysuria, frequency, hematuria, or incontinence  NEUROLOGICAL: No headaches, memory loss, loss of strength, numbness, or tremors  SKIN: No itching, burning, rashes, or lesions   LYMPH Nodes: No enlarged glands  ENDOCRINE: No heat or cold intolerance; No hair loss  MUSCULOSKELETAL: No joint pain or swelling; No muscle, back, or extremity pain  PSYCHIATRIC: No depression, anxiety, mood swings, or difficulty sleeping  HEME/LYMPH: No easy bruising, or bleeding gums  ALLERY AND IMMUNOLOGIC: No hives or eczema	    [ ] All others negative	  [ ] Unable to obtain    PHYSICAL EXAM:  T(C): 38 (19 @ 20:58), Max: 38.4 (19 @ 16:37)  HR: 74 (19 @ 20:58) (72 - 76)  BP: 147/68 (19 @ 20:58) (137/40 - 161/46)  RR: 18 (19 @ 20:58) (16 - 20)  SpO2: 100% (19 @ 20:58) (98% - 100%)  Wt(kg): --  I&O's Summary    19 Mar 2019 07:  -  20 Mar 2019 07:00  --------------------------------------------------------  IN: 0 mL / OUT: 2500 mL / NET: -2500 mL    20 Mar 2019 07:01  -  20 Mar 2019 23:27  --------------------------------------------------------  IN: 0 mL / OUT: 2040 mL / NET: -2040 mL        Appearance: Normal	  HEENT:   Normal oral mucosa, PERRL, EOMI	  Lymphatic: No lymphadenopathy  Cardiovascular: Normal S1 S2, No JVD, No murmurs, No edema  Respiratory: Lungs clear to auscultation	  Psychiatry: A & O x 3, Mood & affect appropriate  Gastrointestinal:  Soft, Non-tender, + BS	  Skin: No rashes, No ecchymoses, No cyanosis	  Neurologic: Non-focal  Extremities: Normal range of motion, No clubbing, cyanosis or edema  Vascular: Peripheral pulses palpable 2+ bilaterally    MEDICATIONS  (STANDING):  amLODIPine   Tablet 10 milliGRAM(s) Oral daily  calcium acetate 667 milliGRAM(s) Oral four times a day with meals  chlorhexidine 4% Liquid 1 Application(s) Topical every 12 hours  docusate sodium 100 milliGRAM(s) Oral two times a day  furosemide   Injectable 40 milliGRAM(s) IV Push every 12 hours  heparin  Injectable 5000 Unit(s) SubCutaneous every 12 hours  hydrALAZINE 25 milliGRAM(s) Oral three times a day  nystatin Powder 1 Application(s) Topical every 12 hours  pantoprazole    Tablet 40 milliGRAM(s) Oral before breakfast  senna 2 Tablet(s) Oral at bedtime      TELEMETRY: 	    ECG:  	  RADIOLOGY:  OTHER: 	  	  CBC Full  -  ( 20 Mar 2019 07:02 )  WBC Count : 5.55 K/uL  Hemoglobin : 8.7 g/dL  Hematocrit : 29.0 %  Platelet Count - Automated : 130 K/uL  Mean Cell Volume : 100.0 fl  Mean Cell Hemoglobin : 30.0 pg  Mean Cell Hemoglobin Concentration : 30.0 gm/dL  Auto Neutrophil # : x  Auto Lymphocyte # : x  Auto Monocyte # : x  Auto Eosinophil # : x  Auto Basophil # : x  Auto Neutrophil % : x  Auto Lymphocyte % : x  Auto Monocyte % : x  Auto Eosinophil % : x  Auto Basophil % : x        CARDIAC MARKERS:                              8.7    5.55  )-----------( 130      ( 20 Mar 2019 07:02 )             29.0       03-20    141  |  106  |  78<H>  ----------------------------<  84  4.5   |  32<H>  |  1.69<H>    Ca    8.5      20 Mar 2019 07:02  Phos  3.3       Mg     2.1         TPro  6.4  /  Alb  2.5<L>  /  TBili  0.3  /  DBili  x   /  AST  10  /  ALT  17  /  AlkPhos  49            Urinalysis Basic - ( 20 Mar 2019 17:07 )    Color: Yellow / Appearance: Clear / S.015 / pH: x  Gluc: x / Ketone: Negative  / Bili: Negative / Urobili: Negative   Blood: x / Protein: 30 mg/dL / Nitrite: Negative   Leuk Esterase: Negative / RBC: 0-2 /HPF / WBC 0-2 /HPF   Sq Epi: x / Non Sq Epi: Occasional /HPF / Bacteria: Trace /HPF      proBNP: Serum Pro-Brain Natriuretic Peptide: 82494 pg/mL ( @ 06:40)  Serum Pro-Brain Natriuretic Peptide: 8592 pg/mL ( @ 13:16)    Lipid Profile: Cholesterol 146  LDL 38    TG 25    HgA1c: Hemoglobin A1C, Whole Blood: 4.8 % ( @ 10:39)    TSH: Thyroid Stimulating Hormone, Serum: 3.74 uU/mL ( @ 06:39)      < from: Xray Chest 1 View- PORTABLE-Routine (19 @ 15:30) >  EXAM:  XR CHEST PORTABLE ROUTINE 1V                            PROCEDURE DATE:  2019          INTERPRETATION:  INDICATION: CHF, follow-up assessment.    PRIORS: 3/18/2019 at 8:20 AM    VIEWS: Portable AP radiography of the chest performed.    FINDINGS: Since prior evaluation, no significant change is identified.   Heart size cannot be quantitated on this portable evaluation. No superior   mediastinal widening is identified. Airspace opacities bilaterally and   bilateral pleural effusions are without significant change. There is no   evidence for pneumothorax. No mediastinal shift is noted. Degenerative   change of the thoracic spine noted.    IMPRESSION: No significant interval change.                MUNA ALONSO   This document has been electronically signed. Mar 20 2019  3:57P    < end of copied text >

## 2019-03-20 NOTE — PROGRESS NOTE ADULT - ASSESSMENT
1. Renal insufficiency  Chronic  Monitor labs.  Renal F/U  Renal US noted.   Continue meds  Avoid nephrotoxic drugs.  [place] cath in place    2. Hypercapnic respiratory failure  Likely 2nd to pulmonary edema.    XR 3/18 suggestive of CHF.   Bi-pap QHS, on O2 NC.   Bronchodilators  IV Lasix    DVT PPX  Follow up CXR    3. Anemia  Monitor H/H  Had 1u PRBC  Transfuse PRN  GI PPX

## 2019-03-21 LAB
ALBUMIN SERPL ELPH-MCNC: 2.2 G/DL — LOW (ref 3.5–5)
ALP SERPL-CCNC: 46 U/L — SIGNIFICANT CHANGE UP (ref 40–120)
ALT FLD-CCNC: 15 U/L DA — SIGNIFICANT CHANGE UP (ref 10–60)
ANION GAP SERPL CALC-SCNC: 3 MMOL/L — LOW (ref 5–17)
AST SERPL-CCNC: 10 U/L — SIGNIFICANT CHANGE UP (ref 10–40)
BILIRUB SERPL-MCNC: 0.2 MG/DL — SIGNIFICANT CHANGE UP (ref 0.2–1.2)
BUN SERPL-MCNC: 76 MG/DL — HIGH (ref 7–18)
CALCIUM SERPL-MCNC: 8.6 MG/DL — SIGNIFICANT CHANGE UP (ref 8.4–10.5)
CHLORIDE SERPL-SCNC: 105 MMOL/L — SIGNIFICANT CHANGE UP (ref 96–108)
CO2 SERPL-SCNC: 32 MMOL/L — HIGH (ref 22–31)
CREAT SERPL-MCNC: 1.79 MG/DL — HIGH (ref 0.5–1.3)
GLUCOSE SERPL-MCNC: 84 MG/DL — SIGNIFICANT CHANGE UP (ref 70–99)
HCT VFR BLD CALC: 27.5 % — LOW (ref 34.5–45)
HGB BLD-MCNC: 8.3 G/DL — LOW (ref 11.5–15.5)
MCHC RBC-ENTMCNC: 30.2 GM/DL — LOW (ref 32–36)
MCHC RBC-ENTMCNC: 30.5 PG — SIGNIFICANT CHANGE UP (ref 27–34)
MCV RBC AUTO: 101.1 FL — HIGH (ref 80–100)
NRBC # BLD: 0 /100 WBCS — SIGNIFICANT CHANGE UP (ref 0–0)
PLATELET # BLD AUTO: 126 K/UL — LOW (ref 150–400)
POTASSIUM SERPL-MCNC: 4.5 MMOL/L — SIGNIFICANT CHANGE UP (ref 3.5–5.3)
POTASSIUM SERPL-SCNC: 4.5 MMOL/L — SIGNIFICANT CHANGE UP (ref 3.5–5.3)
PROCALCITONIN SERPL-MCNC: 0.15 NG/ML — HIGH (ref 0.02–0.1)
PROT SERPL-MCNC: 6 G/DL — SIGNIFICANT CHANGE UP (ref 6–8.3)
RBC # BLD: 2.72 M/UL — LOW (ref 3.8–5.2)
RBC # FLD: 15 % — HIGH (ref 10.3–14.5)
SODIUM SERPL-SCNC: 140 MMOL/L — SIGNIFICANT CHANGE UP (ref 135–145)
WBC # BLD: 7.81 K/UL — SIGNIFICANT CHANGE UP (ref 3.8–10.5)
WBC # FLD AUTO: 7.81 K/UL — SIGNIFICANT CHANGE UP (ref 3.8–10.5)

## 2019-03-21 RX ADMIN — Medication 40 MILLIGRAM(S): at 17:17

## 2019-03-21 RX ADMIN — PANTOPRAZOLE SODIUM 40 MILLIGRAM(S): 20 TABLET, DELAYED RELEASE ORAL at 06:44

## 2019-03-21 RX ADMIN — CHLORHEXIDINE GLUCONATE 1 APPLICATION(S): 213 SOLUTION TOPICAL at 17:17

## 2019-03-21 RX ADMIN — NYSTATIN CREAM 1 APPLICATION(S): 100000 CREAM TOPICAL at 05:55

## 2019-03-21 RX ADMIN — Medication 25 MILLIGRAM(S): at 21:54

## 2019-03-21 RX ADMIN — Medication 100 MILLIGRAM(S): at 17:17

## 2019-03-21 RX ADMIN — Medication 667 MILLIGRAM(S): at 17:16

## 2019-03-21 RX ADMIN — NYSTATIN CREAM 1 APPLICATION(S): 100000 CREAM TOPICAL at 17:16

## 2019-03-21 RX ADMIN — AMLODIPINE BESYLATE 10 MILLIGRAM(S): 2.5 TABLET ORAL at 06:44

## 2019-03-21 RX ADMIN — HEPARIN SODIUM 5000 UNIT(S): 5000 INJECTION INTRAVENOUS; SUBCUTANEOUS at 17:17

## 2019-03-21 RX ADMIN — Medication 667 MILLIGRAM(S): at 08:07

## 2019-03-21 RX ADMIN — SENNA PLUS 2 TABLET(S): 8.6 TABLET ORAL at 21:54

## 2019-03-21 RX ADMIN — Medication 1 TABLET(S): at 09:04

## 2019-03-21 RX ADMIN — Medication 667 MILLIGRAM(S): at 12:01

## 2019-03-21 RX ADMIN — Medication 25 MILLIGRAM(S): at 14:24

## 2019-03-21 RX ADMIN — Medication 1 TABLET(S): at 21:57

## 2019-03-21 RX ADMIN — Medication 40 MILLIGRAM(S): at 05:47

## 2019-03-21 RX ADMIN — CHLORHEXIDINE GLUCONATE 1 APPLICATION(S): 213 SOLUTION TOPICAL at 05:48

## 2019-03-21 RX ADMIN — Medication 667 MILLIGRAM(S): at 21:55

## 2019-03-21 RX ADMIN — Medication 1 TABLET(S): at 09:30

## 2019-03-21 RX ADMIN — Medication 1 TABLET(S): at 22:57

## 2019-03-21 RX ADMIN — HEPARIN SODIUM 5000 UNIT(S): 5000 INJECTION INTRAVENOUS; SUBCUTANEOUS at 05:47

## 2019-03-21 RX ADMIN — Medication 100 MILLIGRAM(S): at 06:44

## 2019-03-21 RX ADMIN — Medication 25 MILLIGRAM(S): at 06:44

## 2019-03-21 NOTE — PROGRESS NOTE ADULT - SUBJECTIVE AND OBJECTIVE BOX
CHIEF COMPLAINT:Patient is a 89y old  Female who presents with a chief complaint of shortness of breath (21 Mar 2019 14:17)    	  REVIEW OF SYSTEMS:  CONSTITUTIONAL: No fever, weight loss, or fatigue  EYES: No eye pain, visual disturbances, or discharge  ENMT:  No difficulty hearing, tinnitus, vertigo; No sinus or throat pain  NECK: No pain or stiffness  RESPIRATORY: No cough, wheezing, chills or hemoptysis; No Shortness of Breath  CARDIOVASCULAR: No chest pain, palpitations, passing out, dizziness, or leg swelling  GASTROINTESTINAL: No abdominal or epigastric pain. No nausea, vomiting, or hematemesis; No diarrhea or constipation. No melena or hematochezia.  GENITOURINARY: No dysuria, frequency, hematuria, or incontinence  NEUROLOGICAL: No headaches, memory loss, loss of strength, numbness, or tremors  SKIN: No itching, burning, rashes, or lesions   LYMPH Nodes: No enlarged glands  ENDOCRINE: No heat or cold intolerance; No hair loss  MUSCULOSKELETAL: No joint pain or swelling; No muscle, back, or extremity pain  PSYCHIATRIC: No depression, anxiety, mood swings, or difficulty sleeping  HEME/LYMPH: No easy bruising, or bleeding gums  ALLERY AND IMMUNOLOGIC: No hives or eczema	    [ ] All others negative	  [ ] Unable to obtain    PHYSICAL EXAM:  T(C): 37.8 (19 @ 20:59), Max: 37.8 (19 @ 20:59)  HR: 68 (19 @ 20:59) (60 - 68)  BP: 136/47 (19 @ 20:59) (129/40 - 151/42)  RR: 18 (19 @ 20:59) (16 - 18)  SpO2: 100% (19 @ 20:59) (100% - 100%)  Wt(kg): --  I&O's Summary    20 Mar 2019 07:  -  21 Mar 2019 07:00  --------------------------------------------------------  IN: 0 mL / OUT: 2440 mL / NET: -2440 mL    21 Mar 2019 07:01  -  21 Mar 2019 23:22  --------------------------------------------------------  IN: 0 mL / OUT: 200 mL / NET: -200 mL        Appearance: Normal	  HEENT:   Normal oral mucosa, PERRL, EOMI	  Lymphatic: No lymphadenopathy  Cardiovascular: Normal S1 S2, No JVD, No murmurs, No edema  Respiratory: Lungs clear to auscultation	  Psychiatry: A & O x 3, Mood & affect appropriate  Gastrointestinal:  Soft, Non-tender, + BS	  Skin: No rashes, No ecchymoses, No cyanosis	  Neurologic: Non-focal  Extremities: Normal range of motion, No clubbing, cyanosis or edema  Vascular: Peripheral pulses palpable 2+ bilaterally    MEDICATIONS  (STANDING):  amLODIPine   Tablet 10 milliGRAM(s) Oral daily  calcium acetate 667 milliGRAM(s) Oral four times a day with meals  chlorhexidine 4% Liquid 1 Application(s) Topical every 12 hours  docusate sodium 100 milliGRAM(s) Oral two times a day  furosemide   Injectable 40 milliGRAM(s) IV Push every 12 hours  heparin  Injectable 5000 Unit(s) SubCutaneous every 12 hours  hydrALAZINE 25 milliGRAM(s) Oral three times a day  nystatin Powder 1 Application(s) Topical every 12 hours  pantoprazole    Tablet 40 milliGRAM(s) Oral before breakfast  senna 2 Tablet(s) Oral at bedtime      TELEMETRY: 	    ECG:  	  RADIOLOGY:  OTHER: 	  	  CBC Full  -  ( 21 Mar 2019 07:05 )  WBC Count : 7.81 K/uL  Hemoglobin : 8.3 g/dL  Hematocrit : 27.5 %  Platelet Count - Automated : 126 K/uL  Mean Cell Volume : 101.1 fl  Mean Cell Hemoglobin : 30.5 pg  Mean Cell Hemoglobin Concentration : 30.2 gm/dL  Auto Neutrophil # : x  Auto Lymphocyte # : x  Auto Monocyte # : x  Auto Eosinophil # : x  Auto Basophil # : x  Auto Neutrophil % : x  Auto Lymphocyte % : x  Auto Monocyte % : x  Auto Eosinophil % : x  Auto Basophil % : x        CARDIAC MARKERS:                              8.3    7.81  )-----------( 126      ( 21 Mar 2019 07:05 )             27.5       03-21    140  |  105  |  76<H>  ----------------------------<  84  4.5   |  32<H>  |  1.79<H>    Ca    8.6      21 Mar 2019 07:05  Phos  3.3         TPro  6.0  /  Alb  2.2<L>  /  TBili  0.2  /  DBili  x   /  AST  10  /  ALT  15  /  AlkPhos  46            Urinalysis Basic - ( 20 Mar 2019 17:07 )    Color: Yellow / Appearance: Clear / S.015 / pH: x  Gluc: x / Ketone: Negative  / Bili: Negative / Urobili: Negative   Blood: x / Protein: 30 mg/dL / Nitrite: Negative   Leuk Esterase: Negative / RBC: 0-2 /HPF / WBC 0-2 /HPF   Sq Epi: x / Non Sq Epi: Occasional /HPF / Bacteria: Trace /HPF      proBNP: Serum Pro-Brain Natriuretic Peptide: 41824 pg/mL ( @ 06:40)  Serum Pro-Brain Natriuretic Peptide: 8592 pg/mL ( @ 13:16)    Lipid Profile: Cholesterol 146  LDL 38    TG 25    HgA1c: Hemoglobin A1C, Whole Blood: 4.8 % ( @ 10:39)    TSH: Thyroid Stimulating Hormone, Serum: 3.74 uU/mL ( @ 06:39)    < from: Xray Chest 1 View- PORTABLE-Routine (19 @ 15:30) >  EXAM:  XR CHEST PORTABLE ROUTINE 1V                            PROCEDURE DATE:  2019          INTERPRETATION:  INDICATION: CHF, follow-up assessment.    PRIORS: 3/18/2019 at 8:20 AM    VIEWS: Portable AP radiography of the chest performed.    FINDINGS: Since prior evaluation, no significant change is identified.   Heart size cannot be quantitated on this portable evaluation. No superior   mediastinal widening is identified. Airspace opacities bilaterally and   bilateral pleural effusions are without significant change. There is no   evidence for pneumothorax. No mediastinal shift is noted. Degenerative   change of the thoracic spine noted.    IMPRESSION: No significant interval change.                MUNA ALONSO   This document has been electronically signed. Mar 20 2019  3:57PM    < end of copied text >

## 2019-03-21 NOTE — PROGRESS NOTE ADULT - SUBJECTIVE AND OBJECTIVE BOX
CHIEF COMPLAINT:Patient is a 89y old  Female who presents with a chief complaint of shortness of breath .Pt appears comfortable.    	  REVIEW OF SYSTEMS:  CONSTITUTIONAL: No fever, weight loss, or fatigue  EYES: No eye pain, visual disturbances, or discharge  ENT:  No difficulty hearing, tinnitus, vertigo; No sinus or throat pain  NECK: No pain or stiffness  RESPIRATORY: No cough, wheezing, chills or hemoptysis; No Shortness of Breath  CARDIOVASCULAR: No chest pain, palpitations, passing out, dizziness, or leg swelling  GASTROINTESTINAL: No abdominal or epigastric pain. No nausea, vomiting, or hematemesis; No diarrhea or constipation. No melena or hematochezia.  GENITOURINARY: No dysuria, frequency, hematuria, or incontinence  NEUROLOGICAL: No headaches, memory loss, loss of strength, numbness, or tremors  SKIN: No itching, burning, rashes, or lesions   LYMPH Nodes: No enlarged glands  ENDOCRINE: No heat or cold intolerance; No hair loss  MUSCULOSKELETAL: No joint pain or swelling; No muscle, back, or extremity pain  PSYCHIATRIC: No depression, anxiety, mood swings, or difficulty sleeping  HEME/LYMPH: No easy bruising, or bleeding gums  ALLERGY AND IMMUNOLOGIC: No hives or eczema	      PHYSICAL EXAM:  T(C): 36.7 (03-21-19 @ 13:29), Max: 38.4 (03-20-19 @ 16:37)  HR: 66 (03-21-19 @ 13:29) (60 - 74)  BP: 136/40 (03-21-19 @ 13:29) (136/40 - 151/42)  RR: 18 (03-21-19 @ 13:29) (16 - 20)  SpO2: 100% (03-21-19 @ 13:29) (98% - 100%)  Wt(kg): --  I&O's Summary    20 Mar 2019 07:01  -  21 Mar 2019 07:00  --------------------------------------------------------  IN: 0 mL / OUT: 2440 mL / NET: -2440 mL        Appearance: Normal	  HEENT:   Normal oral mucosa, PERRL, EOMI	  Lymphatic: No lymphadenopathy  Cardiovascular: Normal S1 S2, No JVD, No murmurs, No edema  Respiratory: Lungs clear to auscultation	  Gastrointestinal:  Soft, Non-tender, + BS	  Skin: No rashes, No ecchymoses, No cyanosis	  Extremities: Normal range of motion, No clubbing, cyanosis or edema  Vascular: Peripheral pulses palpable 2+ bilaterally    MEDICATIONS  (STANDING):  amLODIPine   Tablet 10 milliGRAM(s) Oral daily  calcium acetate 667 milliGRAM(s) Oral four times a day with meals  chlorhexidine 4% Liquid 1 Application(s) Topical every 12 hours  docusate sodium 100 milliGRAM(s) Oral two times a day  furosemide   Injectable 40 milliGRAM(s) IV Push every 12 hours  heparin  Injectable 5000 Unit(s) SubCutaneous every 12 hours  hydrALAZINE 25 milliGRAM(s) Oral three times a day  nystatin Powder 1 Application(s) Topical every 12 hours  pantoprazole    Tablet 40 milliGRAM(s) Oral before breakfast  senna 2 Tablet(s) Oral at bedtime      	  LABS:	 	                    8.3    7.81  )-----------( 126      ( 21 Mar 2019 07:05 )             27.5     03-21    140  |  105  |  76<H>  ----------------------------<  84  4.5   |  32<H>  |  1.79<H>    Ca    8.6      21 Mar 2019 07:05  Phos  3.3     03-20    TPro  6.0  /  Alb  2.2<L>  /  TBili  0.2  /  DBili  x   /  AST  10  /  ALT  15  /  AlkPhos  46  03-21    proBNP: Serum Pro-Brain Natriuretic Peptide: 98600 pg/mL (03-18 @ 06:40)  Serum Pro-Brain Natriuretic Peptide: 8592 pg/mL (03-13 @ 13:16)    Lipid Profile: Cholesterol 146  LDL 38    TG 25    HgA1c: Hemoglobin A1C, Whole Blood: 4.8 % (03-14 @ 10:39)    TSH: Thyroid Stimulating Hormone, Serum: 3.74 uU/mL (03-14 @ 06:39)

## 2019-03-21 NOTE — PROGRESS NOTE ADULT - ASSESSMENT
89 y/o F from [facility] w/ PMHx of GERD, HTN, anemia, colitis, rectal bleeding, chronic ankle swelling, presents to ED due to shortness of breath for 2 days admitted for management of hypercapnic resp failure likely secondary to pulmonary edema and placed on NIV BIPAP.   3/18/19  Patient has made herself a DNR with trial of intubation if necessary.  3/20/19  Patient refusing to use BIPAP.  3/21/19  Dorothy coyle/florentin.

## 2019-03-21 NOTE — PROGRESS NOTE ADULT - ASSESSMENT
89 y/o F from [facility] w/ PMHx of GERD, anemia, colitis, rectal bleeding, chronic ankle swelling, presents to ED due to shortness of breath.  1.PT.  2.Psych f/u.  3.IV lasix.  4.CRI-Renal f/u.  5.DM-Insulin.  6.HTN-cont bp medication.  7.GI and DVT prophylaxis.

## 2019-03-21 NOTE — PROGRESS NOTE ADULT - ASSESSMENT
1. Renal insufficiency  Chronic  Monitor labs.  Renal F/U  Renal US noted.   Continue meds  Avoid nephrotoxic drugs.  De La Cruz cath in place    2. Hypercapnic respiratory failure  Likely 2nd to pulmonary edema.    XR 3/20 no significant change.  Bi-pap QHS, on O2 NC.   Bronchodilators  IV Lasix    DVT PPX  Follow up CXR    3. Anemia  Monitor H/H; today 8.3  Had 1u PRBC  Transfuse PRN  GI PPX 1. Renal insufficiency  Chronic  Monitor labs.  Renal F/U  Renal US noted.   Continue meds  Avoid nephrotoxic drugs.  De La Cruz cath in place    2. Hypercapnic respiratory failure  Likely 2nd to pulmonary edema.    XR 3/20 no significant change.  Bi-pap QHS, on O2 NC.   Bronchodilators  IV Lasix    DVT PPX  Follow up CXR    3. Anemia  Monitor H/H; today 8.3  Had 1u PRBC  Transfuse PRN  GI PPX    4. CHF  Diuretics  Cardio F/U  I&O's

## 2019-03-21 NOTE — PROGRESS NOTE ADULT - SUBJECTIVE AND OBJECTIVE BOX
DNR [x ]   DNI  [  ]    INTERVAL HPI/OVERNIGHT EVENTS: ***    PRESSORS: [ ] YES x[ ] NO  WHICH:    ANTIBIOTICS:                  DATE STARTED:      Cardiovascular:  Heart Failure  Acute   Acute on Chronic  Chronic       amLODIPine   Tablet 10 milliGRAM(s) Oral daily  furosemide   Injectable 40 milliGRAM(s) IV Push every 12 hours  hydrALAZINE 25 milliGRAM(s) Oral three times a day    Pulmonary:  ALBUTerol    90 MICROgram(s) HFA Inhaler 2 Puff(s) Inhalation every 6 hours PRN    Hematalogic:  heparin  Injectable 5000 Unit(s) SubCutaneous every 12 hours    Other:  acetaminophen   Tablet .. 650 milliGRAM(s) Oral every 8 hours PRN  acetaminophen 300 mG/codeine 30 mG 1 Tablet(s) Oral every 4 hours PRN  artificial  tears Solution 1 Drop(s) Both EYES every 6 hours PRN  calcium acetate 667 milliGRAM(s) Oral four times a day with meals  chlorhexidine 4% Liquid 1 Application(s) Topical every 12 hours  docusate sodium 100 milliGRAM(s) Oral two times a day  nystatin Powder 1 Application(s) Topical every 12 hours  pantoprazole    Tablet 40 milliGRAM(s) Oral before breakfast  senna 2 Tablet(s) Oral at bedtime    acetaminophen   Tablet .. 650 milliGRAM(s) Oral every 8 hours PRN  acetaminophen 300 mG/codeine 30 mG 1 Tablet(s) Oral every 4 hours PRN  ALBUTerol    90 MICROgram(s) HFA Inhaler 2 Puff(s) Inhalation every 6 hours PRN  amLODIPine   Tablet 10 milliGRAM(s) Oral daily  artificial  tears Solution 1 Drop(s) Both EYES every 6 hours PRN  calcium acetate 667 milliGRAM(s) Oral four times a day with meals  chlorhexidine 4% Liquid 1 Application(s) Topical every 12 hours  docusate sodium 100 milliGRAM(s) Oral two times a day  furosemide   Injectable 40 milliGRAM(s) IV Push every 12 hours  heparin  Injectable 5000 Unit(s) SubCutaneous every 12 hours  hydrALAZINE 25 milliGRAM(s) Oral three times a day  nystatin Powder 1 Application(s) Topical every 12 hours  pantoprazole    Tablet 40 milliGRAM(s) Oral before breakfast  senna 2 Tablet(s) Oral at bedtime    Drug Dosing Weight  Height (cm): 152.4 (13 Mar 2019 11:40)  Weight (kg): 58.5 (13 Mar 2019 11:40)  BMI (kg/m2): 25.2 (13 Mar 2019 11:40)  BSA (m2): 1.55 (13 Mar 2019 11:40)    CENTRAL LINE: [ ] YES [x ] NO  LOCATION:   DATE INSERTED:  REMOVE: [ ] YES [ ] NO  EXPLAIN:    GUZMAN: [ x] YES [ ] NO    DATE INSERTED:  REMOVE:  [ x] YES [ ] NO  EXPLAIN: Trial void ordered    A-LINE:  [ ] YES [ ] NO  LOCATION:   DATE INSERTED:  REMOVE:  [ ] YES [ ] NO  EXPLAIN:    PAST MEDICAL & SURGICAL HISTORY:  Rectal bleeding  H/O gastroesophageal reflux (GERD)  Colitis  Anemia  CHF (congestive heart failure)  No significant past surgical history               @ 07:01  -   @ 07:00  --------------------------------------------------------  IN: 0 mL / OUT: 2440 mL / NET: -2440 mL            PHYSICAL EXAM:    GENERAL: NAD, well-groomed, well-developed  HEAD:  Atraumatic, Normocephalic  EYES: EOMI, PERRLA, conjunctiva and sclera clear  ENMT: No tonsillar erythema, exudates  NECK: Supple, No JVD  NERVOUS SYSTEM:  Alert & Oriented X3, no new deficits  CHEST/LUNG: Diminished breath sounds BL bases  HEART: Regular rate and rhythm; No murmurs, rubs, or gallops  ABDOMEN: Soft, Nontender, Nondistended; Bowel sounds present  EXTREMITIES:  2+ Peripheral Pulses, No clubbing, cyanosis, or edema  LYMPH: No lymphadenopathy noted  SKIN: No rashes or lesions  +Severe kyphoscoliosis    LABS:  CBC Full  -  ( 21 Mar 2019 07:05 )  WBC Count : 7.81 K/uL  Hemoglobin : 8.3 g/dL  Hematocrit : 27.5 %  Platelet Count - Automated : 126 K/uL  Mean Cell Volume : 101.1 fl  Mean Cell Hemoglobin : 30.5 pg  Mean Cell Hemoglobin Concentration : 30.2 gm/dL  Auto Neutrophil # : x  Auto Lymphocyte # : x  Auto Monocyte # : x  Auto Eosinophil # : x  Auto Basophil # : x  Auto Neutrophil % : x  Auto Lymphocyte % : x  Auto Monocyte % : x  Auto Eosinophil % : x  Auto Basophil % : x        140  |  105  |  76<H>  ----------------------------<  84  4.5   |  32<H>  |  1.79<H>    Ca    8.6      21 Mar 2019 07:05  Phos  3.3     -    TPro  6.0  /  Alb  2.2<L>  /  TBili  0.2  /  DBili  x   /  AST  10  /  ALT  15  /  AlkPhos  46        Urinalysis Basic - ( 20 Mar 2019 17:07 )    Color: Yellow / Appearance: Clear / S.015 / pH: x  Gluc: x / Ketone: Negative  / Bili: Negative / Urobili: Negative   Blood: x / Protein: 30 mg/dL / Nitrite: Negative   Leuk Esterase: Negative / RBC: 0-2 /HPF / WBC 0-2 /HPF   Sq Epi: x / Non Sq Epi: Occasional /HPF / Bacteria: Trace /HPF            [  ]  DVT Prophylaxis  [  ]  Nutrition, Brand, Rate         Goal Rate         Abdominal Nutritional Status -  Malnutrition   Cachexia      Morbid Obesity BMI >/=40    RADIOLOGY & ADDITIONAL STUDIES:  ***    [  ] Goals of Care Discussion with Family/Proxy/Other           Elements of Conversation Discussed: Patient/Family understanding of current illness   Advanced Directives                                                                       Prognosis  Treatment Options  Care Aligned with patient's wishes                                             TIME SPENT: 35 minutes DNR [x ]   DNI  [  ]    INTERVAL HPI/OVERNIGHT EVENTS: ***    PRESSORS: [ ] YES x[ ] NO  WHICH:    ANTIBIOTICS:                  DATE STARTED:      Cardiovascular:  Heart Failure  Acute   Acute on Chronic  Chronic       amLODIPine   Tablet 10 milliGRAM(s) Oral daily  furosemide   Injectable 40 milliGRAM(s) IV Push every 12 hours  hydrALAZINE 25 milliGRAM(s) Oral three times a day    Pulmonary:  ALBUTerol    90 MICROgram(s) HFA Inhaler 2 Puff(s) Inhalation every 6 hours PRN    Hematalogic:  heparin  Injectable 5000 Unit(s) SubCutaneous every 12 hours    Other:  acetaminophen   Tablet .. 650 milliGRAM(s) Oral every 8 hours PRN  acetaminophen 300 mG/codeine 30 mG 1 Tablet(s) Oral every 4 hours PRN  artificial  tears Solution 1 Drop(s) Both EYES every 6 hours PRN  calcium acetate 667 milliGRAM(s) Oral four times a day with meals  chlorhexidine 4% Liquid 1 Application(s) Topical every 12 hours  docusate sodium 100 milliGRAM(s) Oral two times a day  nystatin Powder 1 Application(s) Topical every 12 hours  pantoprazole    Tablet 40 milliGRAM(s) Oral before breakfast  senna 2 Tablet(s) Oral at bedtime    acetaminophen   Tablet .. 650 milliGRAM(s) Oral every 8 hours PRN  acetaminophen 300 mG/codeine 30 mG 1 Tablet(s) Oral every 4 hours PRN  ALBUTerol    90 MICROgram(s) HFA Inhaler 2 Puff(s) Inhalation every 6 hours PRN  amLODIPine   Tablet 10 milliGRAM(s) Oral daily  artificial  tears Solution 1 Drop(s) Both EYES every 6 hours PRN  calcium acetate 667 milliGRAM(s) Oral four times a day with meals  chlorhexidine 4% Liquid 1 Application(s) Topical every 12 hours  docusate sodium 100 milliGRAM(s) Oral two times a day  furosemide   Injectable 40 milliGRAM(s) IV Push every 12 hours  heparin  Injectable 5000 Unit(s) SubCutaneous every 12 hours  hydrALAZINE 25 milliGRAM(s) Oral three times a day  nystatin Powder 1 Application(s) Topical every 12 hours  pantoprazole    Tablet 40 milliGRAM(s) Oral before breakfast  senna 2 Tablet(s) Oral at bedtime    Drug Dosing Weight  Height (cm): 152.4 (13 Mar 2019 11:40)  Weight (kg): 58.5 (13 Mar 2019 11:40)  BMI (kg/m2): 25.2 (13 Mar 2019 11:40)  BSA (m2): 1.55 (13 Mar 2019 11:40)    CENTRAL LINE: [ ] YES [x ] NO  LOCATION:   DATE INSERTED:  REMOVE: [ ] YES [ ] NO  EXPLAIN:    GUZMAN: [ x] YES [ ] NO    DATE INSERTED:  REMOVE:  [ x] YES [ ] NO  EXPLAIN: Trial void ordered    A-LINE:  [ ] YES [ ] NO  LOCATION:   DATE INSERTED:  REMOVE:  [ ] YES [ ] NO  EXPLAIN:    PAST MEDICAL & SURGICAL HISTORY:  Rectal bleeding  H/O gastroesophageal reflux (GERD)  Colitis  Anemia  CHF (congestive heart failure)  No significant past surgical history               @ 07:01  -   @ 07:00  --------------------------------------------------------  IN: 0 mL / OUT: 2440 mL / NET: -2440 mL            PHYSICAL EXAM:    GENERAL: NAD, well-groomed, well-developed  HEAD:  Atraumatic, Normocephalic  EYES: EOMI, PERRLA, conjunctiva and sclera clear  ENMT: No tonsillar erythema, exudates  NECK: Supple, No JVD  NERVOUS SYSTEM:  Alert & Oriented X3, no new deficits  CHEST/LUNG: Diminished breath sounds BL bases  HEART: Regular rate and rhythm; No murmurs, rubs, or gallops  ABDOMEN: Soft, Nontender, Nondistended; Bowel sounds present  EXTREMITIES:  2+ Peripheral Pulses, No clubbing, cyanosis, or edema  LYMPH: No lymphadenopathy noted  SKIN: No rashes or lesions  +Severe kyphoscoliosis    LABS:  CBC Full  -  ( 21 Mar 2019 07:05 )  WBC Count : 7.81 K/uL  Hemoglobin : 8.3 g/dL  Hematocrit : 27.5 %  Platelet Count - Automated : 126 K/uL  Mean Cell Volume : 101.1 fl  Mean Cell Hemoglobin : 30.5 pg  Mean Cell Hemoglobin Concentration : 30.2 gm/dL  Auto Neutrophil # : x  Auto Lymphocyte # : x  Auto Monocyte # : x  Auto Eosinophil # : x  Auto Basophil # : x  Auto Neutrophil % : x  Auto Lymphocyte % : x  Auto Monocyte % : x  Auto Eosinophil % : x  Auto Basophil % : x        140  |  105  |  76<H>  ----------------------------<  84  4.5   |  32<H>  |  1.79<H>    Ca    8.6      21 Mar 2019 07:05  Phos  3.3         TPro  6.0  /  Alb  2.2<L>  /  TBili  0.2  /  DBili  x   /  AST  10  /  ALT  15  /  AlkPhos  46        Urinalysis Basic - ( 20 Mar 2019 17:07 )    Color: Yellow / Appearance: Clear / S.015 / pH: x  Gluc: x / Ketone: Negative  / Bili: Negative / Urobili: Negative   Blood: x / Protein: 30 mg/dL / Nitrite: Negative   Leuk Esterase: Negative / RBC: 0-2 /HPF / WBC 0-2 /HPF   Sq Epi: x / Non Sq Epi: Occasional /HPF / Bacteria: Trace /HPF            [  ]  DVT Prophylaxis  [  ]  Nutrition, Brand, Rate         Goal Rate         Abdominal Nutritional Status -  Malnutrition   Cachexia         RADIOLOGY & ADDITIONAL STUDIES:  ***  < from: Xray Chest 1 View- PORTABLE-Routine (19 @ 15:30) >  FINDINGS: Since prior evaluation, no significant change is identified.   Heart size cannot be quantitated on this portable evaluation. No superior   mediastinal widening is identified. Airspace opacities bilaterally and   bilateral pleural effusions are without significant change. There is no   evidence for pneumothorax. No mediastinal shift is noted. Degenerative   change of the thoracic spine noted.    IMPRESSION: No significant interval change.    < end of copied text >    [  ] Goals of Care Discussion with Family/Proxy/Other           Elements of Conversation Discussed: Patient/Family understanding of current illness   Advanced Directives                                                                       Prognosis  Treatment Options  Care Aligned with patient's wishes                                             TIME SPENT: 35 minutes

## 2019-03-21 NOTE — PROGRESS NOTE ADULT - SUBJECTIVE AND OBJECTIVE BOX
Pt is awake, alert, lying in bed in NAD. On BIPAP QHS. O2 NC in place. CXR from 3/20 noted no interval change. Hgb is 8.3 today. BUN 76, but Creat 1.79.     INTERVAL HPI/OVERNIGHT EVENTS:      VITAL SIGNS:  T(F): 97.7 (19 @ 04:50)  HR: 60 (19 @ 04:50)  BP: 151/42 (19 @ 04:50)  RR: 16 (19 @ 04:50)  SpO2: 100% (19 @ 04:50)  Wt(kg): --  I&O's Detail    20 Mar 2019 07:01  -  21 Mar 2019 07:00  --------------------------------------------------------  IN:  Total IN: 0 mL    OUT:    Indwelling Catheter - Urethral: 2440 mL  Total OUT: 2440 mL    Total NET: -2440 mL        Procalcitonin, Serum (19 @ 00:51)    Procalcitonin, Serum: 0.15: Procalcitonin (PCT) Interpretation (ng/mL) - Diagnosis of systemic  bacterial infection/sepsis  PCT < 0.5: Systemic infection (sepsis) is not likely and risk for  progression to severe systemic infection is low. Local bacterial  infection is possible. If early sepsis is suspected clinically, PCT  should be re-assessed in 6-24 hours.  PCT >/= 0.5 but < 2.0: Systemic infection (sepsis) is possible, but other  conditions are known to elevate PCT as well. Moderate risk for  progression to severe systemic infection. The patient should be closely  monitored both clinically and by re-assessing PCT within 6-24 hours.  PCT >/= 2.0 but < 10.0: Systemic infection (sepsis) is likely, unless  other causes are known. High risk of progression to severe systemic  infection (severe sepsis/septic shock).  PCT >/= 10.0: Important systemic inflammatory response, almost  exclusively due to severe bacterial sepsis or septic shock. High  likelihood of severe sepsis or septic shock. ng/mL          REVIEW OF SYSTEMS:    CONSTITUTIONAL:  No fevers, chills, sweats    HEENT:  Eyes:  No diplopia or blurred vision. ENT:  No earache, sore throat or runny nose.    CARDIOVASCULAR:  No pressure, squeezing, tightness, or heaviness about the chest; no palpitations.    RESPIRATORY:  Per HPI    GASTROINTESTINAL:  No abdominal pain, nausea, vomiting or diarrhea.    GENITOURINARY:  No dysuria, frequency or urgency.    NEUROLOGIC:  No paresthesias, fasciculations, seizures or weakness.    PSYCHIATRIC:  No disorder of thought or mood.      PHYSICAL EXAM:    Constitutional: Well developed and nourished  Eyes:Perrla  ENMT: normal  Neck:supple  Respiratory: good air entry  Cardiovascular: S1 S2 regular  Gastrointestinal: Soft, Non tender  Extremities: No edema  Vascular:normal  Neurological:Awake, alert,Ox3  Musculoskeletal: weak      MEDICATIONS  (STANDING):  amLODIPine   Tablet 10 milliGRAM(s) Oral daily  calcium acetate 667 milliGRAM(s) Oral four times a day with meals  chlorhexidine 4% Liquid 1 Application(s) Topical every 12 hours  docusate sodium 100 milliGRAM(s) Oral two times a day  furosemide   Injectable 40 milliGRAM(s) IV Push every 12 hours  heparin  Injectable 5000 Unit(s) SubCutaneous every 12 hours  hydrALAZINE 25 milliGRAM(s) Oral three times a day  nystatin Powder 1 Application(s) Topical every 12 hours  pantoprazole    Tablet 40 milliGRAM(s) Oral before breakfast  senna 2 Tablet(s) Oral at bedtime    MEDICATIONS  (PRN):  acetaminophen   Tablet .. 650 milliGRAM(s) Oral every 8 hours PRN Moderate Pain (4 - 6)  acetaminophen 300 mG/codeine 30 mG 1 Tablet(s) Oral every 4 hours PRN Severe Pain (7 - 10)  ALBUTerol    90 MICROgram(s) HFA Inhaler 2 Puff(s) Inhalation every 6 hours PRN Shortness of Breath and/or Wheezing  artificial  tears Solution 1 Drop(s) Both EYES every 6 hours PRN Dry Eyes      Allergies    iodine (Unknown)  meperidine (Rash)  oxycodone (Other; Nausea)    Intolerances        LABS:                        8.3    7.81  )-----------( 126      ( 21 Mar 2019 07:05 )             27.5     03-21    140  |  105  |  76<H>  ----------------------------<  84  4.5   |  32<H>  |  1.79<H>    Ca    8.6      21 Mar 2019 07:05  Phos  3.3     03-20    TPro  6.0  /  Alb  2.2<L>  /  TBili  0.2  /  DBili  x   /  AST  10  /  ALT  15  /  AlkPhos  46  03-21      Urinalysis Basic - ( 20 Mar 2019 17:07 )    Color: Yellow / Appearance: Clear / S.015 / pH: x  Gluc: x / Ketone: Negative  / Bili: Negative / Urobili: Negative   Blood: x / Protein: 30 mg/dL / Nitrite: Negative   Leuk Esterase: Negative / RBC: 0-2 /HPF / WBC 0-2 /HPF   Sq Epi: x / Non Sq Epi: Occasional /HPF / Bacteria: Trace /HPF            CAPILLARY BLOOD GLUCOSE        pro-bnp [id number]  @ 06:40     d-dimer --   @ 06:40      RADIOLOGY & ADDITIONAL TESTS:    CXR:  < from: Xray Chest 1 View- PORTABLE-Routine (19 @ 15:30) >    IMPRESSION: No significant interval change.      < end of copied text >    Ct scan chest:    ekg;    echo:

## 2019-03-22 LAB
ALBUMIN SERPL ELPH-MCNC: 2.1 G/DL — LOW (ref 3.5–5)
ALP SERPL-CCNC: 43 U/L — SIGNIFICANT CHANGE UP (ref 40–120)
ALT FLD-CCNC: 14 U/L DA — SIGNIFICANT CHANGE UP (ref 10–60)
ANION GAP SERPL CALC-SCNC: 4 MMOL/L — LOW (ref 5–17)
AST SERPL-CCNC: 10 U/L — SIGNIFICANT CHANGE UP (ref 10–40)
BILIRUB SERPL-MCNC: 0.2 MG/DL — SIGNIFICANT CHANGE UP (ref 0.2–1.2)
BUN SERPL-MCNC: 80 MG/DL — HIGH (ref 7–18)
CALCIUM SERPL-MCNC: 8.3 MG/DL — LOW (ref 8.4–10.5)
CHLORIDE SERPL-SCNC: 102 MMOL/L — SIGNIFICANT CHANGE UP (ref 96–108)
CO2 SERPL-SCNC: 31 MMOL/L — SIGNIFICANT CHANGE UP (ref 22–31)
CREAT SERPL-MCNC: 1.99 MG/DL — HIGH (ref 0.5–1.3)
GLUCOSE SERPL-MCNC: 87 MG/DL — SIGNIFICANT CHANGE UP (ref 70–99)
HCT VFR BLD CALC: 27.3 % — LOW (ref 34.5–45)
HGB BLD-MCNC: 8.1 G/DL — LOW (ref 11.5–15.5)
MCHC RBC-ENTMCNC: 29.7 GM/DL — LOW (ref 32–36)
MCHC RBC-ENTMCNC: 30 PG — SIGNIFICANT CHANGE UP (ref 27–34)
MCV RBC AUTO: 101.1 FL — HIGH (ref 80–100)
NRBC # BLD: 0 /100 WBCS — SIGNIFICANT CHANGE UP (ref 0–0)
NT-PROBNP SERPL-SCNC: HIGH PG/ML (ref 0–450)
PLATELET # BLD AUTO: 134 K/UL — LOW (ref 150–400)
POTASSIUM SERPL-MCNC: 4.9 MMOL/L — SIGNIFICANT CHANGE UP (ref 3.5–5.3)
POTASSIUM SERPL-SCNC: 4.9 MMOL/L — SIGNIFICANT CHANGE UP (ref 3.5–5.3)
PROT SERPL-MCNC: 6 G/DL — SIGNIFICANT CHANGE UP (ref 6–8.3)
RBC # BLD: 2.7 M/UL — LOW (ref 3.8–5.2)
RBC # FLD: 14.9 % — HIGH (ref 10.3–14.5)
SODIUM SERPL-SCNC: 137 MMOL/L — SIGNIFICANT CHANGE UP (ref 135–145)
WBC # BLD: 6.96 K/UL — SIGNIFICANT CHANGE UP (ref 3.8–10.5)
WBC # FLD AUTO: 6.96 K/UL — SIGNIFICANT CHANGE UP (ref 3.8–10.5)

## 2019-03-22 RX ADMIN — PANTOPRAZOLE SODIUM 40 MILLIGRAM(S): 20 TABLET, DELAYED RELEASE ORAL at 05:51

## 2019-03-22 RX ADMIN — CHLORHEXIDINE GLUCONATE 1 APPLICATION(S): 213 SOLUTION TOPICAL at 05:51

## 2019-03-22 RX ADMIN — NYSTATIN CREAM 1 APPLICATION(S): 100000 CREAM TOPICAL at 18:10

## 2019-03-22 RX ADMIN — Medication 667 MILLIGRAM(S): at 11:45

## 2019-03-22 RX ADMIN — Medication 100 MILLIGRAM(S): at 18:09

## 2019-03-22 RX ADMIN — Medication 40 MILLIGRAM(S): at 05:50

## 2019-03-22 RX ADMIN — Medication 25 MILLIGRAM(S): at 22:21

## 2019-03-22 RX ADMIN — Medication 667 MILLIGRAM(S): at 18:09

## 2019-03-22 RX ADMIN — HEPARIN SODIUM 5000 UNIT(S): 5000 INJECTION INTRAVENOUS; SUBCUTANEOUS at 05:50

## 2019-03-22 RX ADMIN — Medication 100 MILLIGRAM(S): at 05:50

## 2019-03-22 RX ADMIN — NYSTATIN CREAM 1 APPLICATION(S): 100000 CREAM TOPICAL at 05:50

## 2019-03-22 RX ADMIN — SENNA PLUS 2 TABLET(S): 8.6 TABLET ORAL at 22:21

## 2019-03-22 RX ADMIN — Medication 667 MILLIGRAM(S): at 09:23

## 2019-03-22 RX ADMIN — Medication 667 MILLIGRAM(S): at 22:21

## 2019-03-22 RX ADMIN — HEPARIN SODIUM 5000 UNIT(S): 5000 INJECTION INTRAVENOUS; SUBCUTANEOUS at 18:09

## 2019-03-22 RX ADMIN — Medication 25 MILLIGRAM(S): at 05:50

## 2019-03-22 RX ADMIN — CHLORHEXIDINE GLUCONATE 1 APPLICATION(S): 213 SOLUTION TOPICAL at 18:09

## 2019-03-22 RX ADMIN — AMLODIPINE BESYLATE 10 MILLIGRAM(S): 2.5 TABLET ORAL at 05:50

## 2019-03-22 RX ADMIN — Medication 40 MILLIGRAM(S): at 18:09

## 2019-03-22 NOTE — PROGRESS NOTE ADULT - SUBJECTIVE AND OBJECTIVE BOX
DNR [x ]   DNI  [  ]    INTERVAL HPI/ OVERNIGHT EVENTS: No overnight events       Antimicrobial: none    Cardiovascular:  amLODIPine   Tablet 10 milliGRAM(s) Oral daily  furosemide   Injectable 40 milliGRAM(s) IV Push every 12 hours  hydrALAZINE 25 milliGRAM(s) Oral three times a day    Pulmonary:  ALBUTerol    90 MICROgram(s) HFA Inhaler 2 Puff(s) Inhalation every 6 hours PRN    Hematalogic:  heparin  Injectable 5000 Unit(s) SubCutaneous every 12 hours    Other:  acetaminophen   Tablet .. 650 milliGRAM(s) Oral every 8 hours PRN  acetaminophen 300 mG/codeine 30 mG 1 Tablet(s) Oral every 4 hours PRN  artificial  tears Solution 1 Drop(s) Both EYES every 6 hours PRN  calcium acetate 667 milliGRAM(s) Oral four times a day with meals  chlorhexidine 4% Liquid 1 Application(s) Topical every 12 hours  docusate sodium 100 milliGRAM(s) Oral two times a day  nystatin Powder 1 Application(s) Topical every 12 hours  pantoprazole    Tablet 40 milliGRAM(s) Oral before breakfast  senna 2 Tablet(s) Oral at bedtime    acetaminophen   Tablet .. 650 milliGRAM(s) Oral every 8 hours PRN  acetaminophen 300 mG/codeine 30 mG 1 Tablet(s) Oral every 4 hours PRN  ALBUTerol    90 MICROgram(s) HFA Inhaler 2 Puff(s) Inhalation every 6 hours PRN  amLODIPine   Tablet 10 milliGRAM(s) Oral daily  artificial  tears Solution 1 Drop(s) Both EYES every 6 hours PRN  calcium acetate 667 milliGRAM(s) Oral four times a day with meals  chlorhexidine 4% Liquid 1 Application(s) Topical every 12 hours  docusate sodium 100 milliGRAM(s) Oral two times a day  furosemide   Injectable 40 milliGRAM(s) IV Push every 12 hours  heparin  Injectable 5000 Unit(s) SubCutaneous every 12 hours  hydrALAZINE 25 milliGRAM(s) Oral three times a day  nystatin Powder 1 Application(s) Topical every 12 hours  pantoprazole    Tablet 40 milliGRAM(s) Oral before breakfast  senna 2 Tablet(s) Oral at bedtime    Drug Dosing Weight  Height (cm): 152.4 (13 Mar 2019 11:40)  Weight (kg): 58.5 (13 Mar 2019 11:40)  BMI (kg/m2): 25.2 (13 Mar 2019 11:40)  BSA (m2): 1.55 (13 Mar 2019 11:40)      GUZMAN: [ ] YES [ x ] NO    DATE INSERTED:  REMOVE:  [ ] YES [ ] NO  EXPLAIN:      PMH -reviewed admission note, no change since admission  PAST MEDICAL & SURGICAL HISTORY:  Rectal bleeding  H/O gastroesophageal reflux (GERD)  Colitis  Anemia  CHF (congestive heart failure)  No significant past surgical history      Vital Signs Last 24 Hrs  T(C): 37.8 (21 Mar 2019 20:59), Max: 37.8 (21 Mar 2019 20:59)  T(F): 100 (21 Mar 2019 20:59), Max: 100 (21 Mar 2019 20:59)  HR: 68 (21 Mar 2019 20:59) (60 - 68)  BP: 136/47 (21 Mar 2019 20:59) (129/40 - 151/42)  BP(mean): --  ABP: --  ABP(mean): --  RR: 18 (21 Mar 2019 20:59) (16 - 18)  SpO2: 100% (21 Mar 2019 20:59) (100% - 100%)             @ 07:01  -  03- @ 07:00  --------------------------------------------------------  IN: 0 mL / OUT: 2440 mL / NET: -2440 mL        PHYSICAL EXAM:    GENERAL: NAD, well-groomed, well-developed  HEAD:  Atraumatic, Normocephalic  EYES: EOMI, PERRLA, conjunctiva and sclera clear  ENMT: No tonsillar erythema, exudates  NECK: Supple, No JVD  NERVOUS SYSTEM:  Alert & Oriented X3, no new deficits  CHEST/LUNG: Diminished breath sounds BL bases  HEART: Regular rate and rhythm; No murmurs, rubs, or gallops  ABDOMEN: Soft, Nontender, Nondistended; Bowel sounds present  EXTREMITIES:  2+ Peripheral Pulses, No clubbing, cyanosis, or edema  LYMPH: No lymphadenopathy noted  SKIN: No rashes or lesions  +Severe kyphoscoliosis      LABS:  CBC Full  -  ( 21 Mar 2019 07:05 )  WBC Count : 7.81 K/uL  Hemoglobin : 8.3 g/dL  Hematocrit : 27.5 %  Platelet Count - Automated : 126 K/uL  Mean Cell Volume : 101.1 fl  Mean Cell Hemoglobin : 30.5 pg  Mean Cell Hemoglobin Concentration : 30.2 gm/dL  Auto Neutrophil # : x  Auto Lymphocyte # : x  Auto Monocyte # : x  Auto Eosinophil # : x  Auto Basophil # : x  Auto Neutrophil % : x  Auto Lymphocyte % : x  Auto Monocyte % : x  Auto Eosinophil % : x  Auto Basophil % : x        140  |  105  |  76<H>  ----------------------------<  84  4.5   |  32<H>  |  1.79<H>    Ca    8.6      21 Mar 2019 07:05  Phos  3.3         TPro  6.0  /  Alb  2.2<L>  /  TBili  0.2  /  DBili  x   /  AST  10  /  ALT  15  /  AlkPhos  46        Urinalysis Basic - ( 20 Mar 2019 17:07 )    Color: Yellow / Appearance: Clear / S.015 / pH: x  Gluc: x / Ketone: Negative  / Bili: Negative / Urobili: Negative   Blood: x / Protein: 30 mg/dL / Nitrite: Negative   Leuk Esterase: Negative / RBC: 0-2 /HPF / WBC 0-2 /HPF   Sq Epi: x / Non Sq Epi: Occasional /HPF / Bacteria: Trace /HPF      Culture Results:   No growth to date. ( @ 00:27)      CRITICAL CARE TIME SPENT: 35 minutes

## 2019-03-22 NOTE — PROGRESS NOTE ADULT - ASSESSMENT
89 y/o F from [facility] w/ PMHx of GERD, HTN, anemia, colitis, rectal bleeding, chronic ankle swelling, presents to ED due to shortness of breath for 2 days, admitted for management of hypercapnic respiratory failure likely secondary to pulmonary edema and placed on NIV BIPAP. Patient has made herself a DNR with trial of intubation if necessary. Patient refusing to use BIPAP.

## 2019-03-22 NOTE — PROGRESS NOTE ADULT - SUBJECTIVE AND OBJECTIVE BOX
CHIEF COMPLAINT:Patient is a 89y old  Female who presents with a chief complaint of shortness of breath .Pt appears comfortable.    	  REVIEW OF SYSTEMS:  CONSTITUTIONAL: No fever, weight loss, or fatigue  EYES: No eye pain, visual disturbances, or discharge  ENT:  No difficulty hearing, tinnitus, vertigo; No sinus or throat pain  NECK: No pain or stiffness  RESPIRATORY: No cough, wheezing, chills or hemoptysis; No Shortness of Breath  CARDIOVASCULAR: No chest pain, palpitations, passing out, dizziness, or leg swelling  GASTROINTESTINAL: No abdominal or epigastric pain. No nausea, vomiting, or hematemesis; No diarrhea or constipation. No melena or hematochezia.  GENITOURINARY: No dysuria, frequency, hematuria, or incontinence  NEUROLOGICAL: No headaches, memory loss, loss of strength, numbness, or tremors  SKIN: No itching, burning, rashes, or lesions   LYMPH Nodes: No enlarged glands  ENDOCRINE: No heat or cold intolerance; No hair loss  MUSCULOSKELETAL: No joint pain or swelling; No muscle, back, or extremity pain  PSYCHIATRIC: No depression, anxiety, mood swings, or difficulty sleeping  HEME/LYMPH: No easy bruising, or bleeding gums  ALLERGY AND IMMUNOLOGIC: No hives or eczema	    PHYSICAL EXAM:  T(C): 37.6 (03-22-19 @ 05:10), Max: 37.8 (03-21-19 @ 20:59)  HR: 68 (03-22-19 @ 05:10) (68 - 68)  BP: 135/42 (03-22-19 @ 05:10) (135/42 - 136/47)  RR: 17 (03-22-19 @ 05:10) (17 - 18)  SpO2: 100% (03-22-19 @ 05:10) (100% - 100%)    I&O's Summary    21 Mar 2019 07:01  -  22 Mar 2019 07:00  --------------------------------------------------------  IN: 0 mL / OUT: 200 mL / NET: -200 mL        Appearance: Normal	  HEENT:   Normal oral mucosa, PERRL, EOMI	  Lymphatic: No lymphadenopathy  Cardiovascular: Normal S1 S2, No JVD, No murmurs, No edema  Respiratory: Lungs clear to auscultation	  Psychiatry: A & O x 3, Mood & affect appropriate  Gastrointestinal:  Soft, Non-tender, + BS	  Skin: No rashes, No ecchymoses, No cyanosis	  Neurologic: Non-focal  Extremities: Normal range of motion, No clubbing, cyanosis or edema  Vascular: Peripheral pulses palpable 2+ bilaterally    MEDICATIONS  (STANDING):  amLODIPine   Tablet 10 milliGRAM(s) Oral daily  calcium acetate 667 milliGRAM(s) Oral four times a day with meals  chlorhexidine 4% Liquid 1 Application(s) Topical every 12 hours  docusate sodium 100 milliGRAM(s) Oral two times a day  furosemide   Injectable 40 milliGRAM(s) IV Push every 12 hours  heparin  Injectable 5000 Unit(s) SubCutaneous every 12 hours  hydrALAZINE 25 milliGRAM(s) Oral three times a day  nystatin Powder 1 Application(s) Topical every 12 hours  pantoprazole    Tablet 40 milliGRAM(s) Oral before breakfast  senna 2 Tablet(s) Oral at bedtime      	  LABS:	 	                       8.1    6.96  )-----------( 134      ( 22 Mar 2019 06:21 )             27.3     03-22    137  |  102  |  80<H>  ----------------------------<  87  4.9   |  31  |  1.99<H>    Ca    8.3<L>      22 Mar 2019 06:21    TPro  6.0  /  Alb  2.1<L>  /  TBili  0.2  /  DBili  x   /  AST  10  /  ALT  14  /  AlkPhos  43  03-22    proBNP: Serum Pro-Brain Natriuretic Peptide: 26095 pg/mL (03-22 @ 06:21)  Serum Pro-Brain Natriuretic Peptide: 13599 pg/mL (03-18 @ 06:40)  Serum Pro-Brain Natriuretic Peptide: 8592 pg/mL (03-13 @ 13:16)    Lipid Profile: Cholesterol 146  LDL 38    TG 25    HgA1c: Hemoglobin A1C, Whole Blood: 4.8 % (03-14 @ 10:39)    TSH: Thyroid Stimulating Hormone, Serum: 3.74 uU/mL (03-14 @ 06:39)

## 2019-03-22 NOTE — CHART NOTE - NSCHARTNOTEFT_GEN_A_CORE
Assessment:   Patient reports [ ] nausea  [ ] vomiting [ ] diarrhea [ ] constipation  [ ]chewing problems [ ] swallowing issues  [ ] other:   Pt visited. OOB to chair. Observed Lunch Meal.  Pt ate ~ 25 % of Meal. D/W PCA pt ate Good B fast. PT is on o2. Pt DG  from ICU to 4 N on 03/18. PO tolerated.    PO intake:   Source for PO intake [ ] Patient/family [ ] chart [ ] staff     Current Weight:   % Weight Change    Pertinent Medications: MEDICATIONS  (STANDING):  amLODIPine   Tablet 10 milliGRAM(s) Oral daily  calcium acetate 667 milliGRAM(s) Oral four times a day with meals  chlorhexidine 4% Liquid 1 Application(s) Topical every 12 hours  docusate sodium 100 milliGRAM(s) Oral two times a day  furosemide   Injectable 40 milliGRAM(s) IV Push every 12 hours  heparin  Injectable 5000 Unit(s) SubCutaneous every 12 hours  hydrALAZINE 25 milliGRAM(s) Oral three times a day  nystatin Powder 1 Application(s) Topical every 12 hours  pantoprazole    Tablet 40 milliGRAM(s) Oral before breakfast  senna 2 Tablet(s) Oral at bedtime    MEDICATIONS  (PRN):  acetaminophen   Tablet .. 650 milliGRAM(s) Oral every 8 hours PRN Moderate Pain (4 - 6)  acetaminophen 300 mG/codeine 30 mG 1 Tablet(s) Oral every 4 hours PRN Severe Pain (7 - 10)  ALBUTerol    90 MICROgram(s) HFA Inhaler 2 Puff(s) Inhalation every 6 hours PRN Shortness of Breath and/or Wheezing  artificial  tears Solution 1 Drop(s) Both EYES every 6 hours PRN Dry Eyes    Pertinent Labs:  03-22 Na137 mmol/L Glu 87 mg/dL K+ 4.9 mmol/L Cr  1.99 mg/dL<H> BUN 80 mg/dL<H> 03-20 Phos 3.3 mg/dL 03-22 Alb 2.1 g/dL<L> 03-14 [id number] 4.8 % 03-14 Chol 146 mg/dL LDL 38 mg/dL  mg/dL Trig 25 mg/dL      Skin:     Estimated Needs:   [ ] no change since previous assessment  [ ] recalculated:       Previous Nutrition Diagnosis:   [x ] Inadequate Energy Intake [ ]Inadequate Oral Intake [ ] Excessive Energy Intake   [ ] Underweight [ ] Increased Nutrient Needs [ ] Overweight/Obesity   [ ] Altered GI Function [ ] Unintended Weight Loss [ ] Food & Nutrition Related Knowledge Deficit [ ] Malnutrition     Nutrition Diagnosis is [ x ongoing  [ ] resolved [ ] not applicable     New Nutrition Diagnosis: [ ] not applicable  [ ] Inadequate Energy Intake [ ]Inadequate Oral Intake [ ] Excessive Energy Intake   [ ] Underweight [ ] Increased Nutrient Needs [ ] Overweight/Obesity   [ ] Altered GI Function [ ] Unintended Weight Loss [ ] Food & Nutrition Related Knowledge Deficit [ ] Malnutrition     Related to:     As evidenced by:     Interventions:   Recommend  [ ] Change Diet To:  [ x] Nutrition Supplement Ensure Enlive 240 ml BID.  [ ] Nutrition Support  [x ] Other:  Soft diet    Monitoring and Evaluation:   [ ] PO intake [x ] Tolerance to diet prescription [ ] weights [ ] follow up per protocol  [ ] other:

## 2019-03-22 NOTE — PROGRESS NOTE ADULT - SUBJECTIVE AND OBJECTIVE BOX
Pt is awake, alert, lying in bed in NAD. BIPAP QHS. Denies cough/SOB at present.     INTERVAL HPI/OVERNIGHT EVENTS:      VITAL SIGNS:  T(F): 99.6 (19 @ 05:10)  HR: 68 (19 @ 05:10)  BP: 135/42 (19 @ 05:10)  RR: 17 (19 @ 05:10)  SpO2: 100% (19 @ 05:10)  Wt(kg): --  I&O's Detail    21 Mar 2019 07:01  -  22 Mar 2019 07:00  --------------------------------------------------------  IN:  Total IN: 0 mL    OUT:    Indwelling Catheter - Urethral: 200 mL  Total OUT: 200 mL    Total NET: -200 mL              REVIEW OF SYSTEMS:    CONSTITUTIONAL:  No fevers, chills, sweats    HEENT:  Eyes:  No diplopia or blurred vision. ENT:  No earache, sore throat or runny nose.    CARDIOVASCULAR:  No pressure, squeezing, tightness, or heaviness about the chest; no palpitations.    RESPIRATORY:  Per HPI    GASTROINTESTINAL:  No abdominal pain, nausea, vomiting or diarrhea.    GENITOURINARY:  No dysuria, frequency or urgency.    NEUROLOGIC:  No paresthesias, fasciculations, seizures or weakness.    PSYCHIATRIC:  No disorder of thought or mood.      PHYSICAL EXAM:    Constitutional: Well developed and nourished  Eyes:Perrla  ENMT: normal  Neck:supple  Respiratory: good air entry  Cardiovascular: S1 S2 regular  Gastrointestinal: Soft, Non tender  Extremities: No edema  Vascular:normal  Neurological:Awake, alert  Musculoskeletal:Normal      MEDICATIONS  (STANDING):  amLODIPine   Tablet 10 milliGRAM(s) Oral daily  calcium acetate 667 milliGRAM(s) Oral four times a day with meals  chlorhexidine 4% Liquid 1 Application(s) Topical every 12 hours  docusate sodium 100 milliGRAM(s) Oral two times a day  furosemide   Injectable 40 milliGRAM(s) IV Push every 12 hours  heparin  Injectable 5000 Unit(s) SubCutaneous every 12 hours  hydrALAZINE 25 milliGRAM(s) Oral three times a day  nystatin Powder 1 Application(s) Topical every 12 hours  pantoprazole    Tablet 40 milliGRAM(s) Oral before breakfast  senna 2 Tablet(s) Oral at bedtime    MEDICATIONS  (PRN):  acetaminophen   Tablet .. 650 milliGRAM(s) Oral every 8 hours PRN Moderate Pain (4 - 6)  acetaminophen 300 mG/codeine 30 mG 1 Tablet(s) Oral every 4 hours PRN Severe Pain (7 - 10)  ALBUTerol    90 MICROgram(s) HFA Inhaler 2 Puff(s) Inhalation every 6 hours PRN Shortness of Breath and/or Wheezing  artificial  tears Solution 1 Drop(s) Both EYES every 6 hours PRN Dry Eyes      Allergies    iodine (Unknown)  meperidine (Rash)  oxycodone (Other; Nausea)    Intolerances        LABS:                        8.1    6.96  )-----------( 134      ( 22 Mar 2019 06:21 )             27.3         137  |  102  |  80<H>  ----------------------------<  87  4.9   |  31  |  1.99<H>    Ca    8.3<L>      22 Mar 2019 06:21    TPro  6.0  /  Alb  2.1<L>  /  TBili  0.2  /  DBili  x   /  AST  10  /  ALT  14  /  Alk Phos  43        Urinalysis Basic - ( 20 Mar 2019 17:07 )    Color: Yellow / Appearance: Clear / S.015 / pH: x  Gluc: x / Ketone: Negative  / Bili: Negative / Urobili: Negative   Blood: x / Protein: 30 mg/dL / Nitrite: Negative   Leuk Esterase: Negative / RBC: 0-2 /HPF / WBC 0-2 /HPF   Sq Epi: x / Non Sq Epi: Occasional /HPF / Bacteria: Trace /HPF            CAPILLARY BLOOD GLUCOSE        pro-bnp [id number]  @ 06:21     d-dimer --   @ 06:21  pro-bnp [id number]  @ 06:40     d-dimer --   @ 06:40      RADIOLOGY & ADDITIONAL TESTS:  < from: US Duplex Venous Lower Ext Ltd, Right (19 @ 19:09) >  IMPRESSION:     No evidence of right lower extremity deep venous thrombosis.    < end of copied text >    CXR:  < from: Xray Chest 1 View- PORTABLE-Routine (19 @ 15:30) >  IMPRESSION: No significant interval change.    < end of copied text >    Ct scan chest:    ekg;    echo:

## 2019-03-22 NOTE — PROGRESS NOTE ADULT - ASSESSMENT
1. Renal insufficiency  Chronic  Monitor labs.  Renal F/U  Renal US noted.   Continue meds  Avoid nephrotoxic drugs.  [place] Cruz cath in place    2. Hypercapnic respiratory failure  Likely 2nd to pulmonary edema.    XR 3/20 no significant change.  Bi-pap QHS, on O2 NC.   Bronchodilators  IV Lasix    DVT PPX  Follow up CXR    3. Anemia  Monitor H/H; today 8.3  Had 1u PRBC  Transfuse PRN  GI PPX    4. CHF  Diuretics  Cardio F/U  I&O's

## 2019-03-22 NOTE — PROGRESS NOTE ADULT - SUBJECTIVE AND OBJECTIVE BOX
CHIEF COMPLAINT:Patient is a 89y old  Female who presents with a chief complaint of shortness of breath (22 Mar 2019 14:16)    	  REVIEW OF SYSTEMS:  CONSTITUTIONAL: No fever, weight loss, or fatigue  EYES: No eye pain, visual disturbances, or discharge  ENMT:  No difficulty hearing, tinnitus, vertigo; No sinus or throat pain  NECK: No pain or stiffness  RESPIRATORY: No cough, wheezing, chills or hemoptysis; No Shortness of Breath  CARDIOVASCULAR: No chest pain, palpitations, passing out, dizziness, or leg swelling  GASTROINTESTINAL: No abdominal or epigastric pain. No nausea, vomiting, or hematemesis; No diarrhea or constipation. No melena or hematochezia.  GENITOURINARY: No dysuria, frequency, hematuria, or incontinence  NEUROLOGICAL: No headaches, memory loss, loss of strength, numbness, or tremors  SKIN: No itching, burning, rashes, or lesions   LYMPH Nodes: No enlarged glands  ENDOCRINE: No heat or cold intolerance; No hair loss  MUSCULOSKELETAL: No joint pain or swelling; No muscle, back, or extremity pain  PSYCHIATRIC: No depression, anxiety, mood swings, or difficulty sleeping  HEME/LYMPH: No easy bruising, or bleeding gums  ALLERY AND IMMUNOLOGIC: No hives or eczema	    [ ] All others negative	  [ ] Unable to obtain    PHYSICAL EXAM:  T(C): 37.5 (03-22-19 @ 21:17), Max: 37.6 (03-22-19 @ 05:10)  HR: 72 (03-22-19 @ 21:17) (68 - 72)  BP: 156/39 (03-22-19 @ 21:17) (135/42 - 156/39)  RR: 16 (03-22-19 @ 21:17) (16 - 17)  SpO2: 100% (03-22-19 @ 21:17) (99% - 100%)  Wt(kg): --  I&O's Summary    21 Mar 2019 07:01  -  22 Mar 2019 07:00  --------------------------------------------------------  IN: 0 mL / OUT: 200 mL / NET: -200 mL        Appearance: Normal	  HEENT:   Normal oral mucosa, PERRL, EOMI	  Lymphatic: No lymphadenopathy  Cardiovascular: Normal S1 S2, No JVD, No murmurs, No edema  Respiratory: Lungs clear to auscultation	  Psychiatry: A & O x 3, Mood & affect appropriate  Gastrointestinal:  Soft, Non-tender, + BS	  Skin: No rashes, No ecchymoses, No cyanosis	  Neurologic: Non-focal  Extremities: Normal range of motion, No clubbing, cyanosis or edema  Vascular: Peripheral pulses palpable 2+ bilaterally    MEDICATIONS  (STANDING):  amLODIPine   Tablet 10 milliGRAM(s) Oral daily  calcium acetate 667 milliGRAM(s) Oral four times a day with meals  chlorhexidine 4% Liquid 1 Application(s) Topical every 12 hours  docusate sodium 100 milliGRAM(s) Oral two times a day  furosemide   Injectable 40 milliGRAM(s) IV Push every 12 hours  heparin  Injectable 5000 Unit(s) SubCutaneous every 12 hours  hydrALAZINE 25 milliGRAM(s) Oral three times a day  nystatin Powder 1 Application(s) Topical every 12 hours  pantoprazole    Tablet 40 milliGRAM(s) Oral before breakfast  senna 2 Tablet(s) Oral at bedtime      TELEMETRY: 	    ECG:  	  RADIOLOGY:  OTHER: 	  	  CBC Full  -  ( 22 Mar 2019 06:21 )  WBC Count : 6.96 K/uL  Hemoglobin : 8.1 g/dL  Hematocrit : 27.3 %  Platelet Count - Automated : 134 K/uL  Mean Cell Volume : 101.1 fl  Mean Cell Hemoglobin : 30.0 pg  Mean Cell Hemoglobin Concentration : 29.7 gm/dL  Auto Neutrophil # : x  Auto Lymphocyte # : x  Auto Monocyte # : x  Auto Eosinophil # : x  Auto Basophil # : x  Auto Neutrophil % : x  Auto Lymphocyte % : x  Auto Monocyte % : x  Auto Eosinophil % : x  Auto Basophil % : x        CARDIAC MARKERS:                              8.1    6.96  )-----------( 134      ( 22 Mar 2019 06:21 )             27.3       03-22    137  |  102  |  80<H>  ----------------------------<  87  4.9   |  31  |  1.99<H>    Ca    8.3<L>      22 Mar 2019 06:21    TPro  6.0  /  Alb  2.1<L>  /  TBili  0.2  /  DBili  x   /  AST  10  /  ALT  14  /  AlkPhos  43  03-22            proBNP: Serum Pro-Brain Natriuretic Peptide: 82844 pg/mL (03-22 @ 06:21)  Serum Pro-Brain Natriuretic Peptide: 72612 pg/mL (03-18 @ 06:40)  Serum Pro-Brain Natriuretic Peptide: 8592 pg/mL (03-13 @ 13:16)    Lipid Profile: Cholesterol 146  LDL 38    TG 25    HgA1c: Hemoglobin A1C, Whole Blood: 4.8 % (03-14 @ 10:39)    TSH: Thyroid Stimulating Hormone, Serum: 3.74 uU/mL (03-14 @ 06:39)

## 2019-03-23 RX ADMIN — SENNA PLUS 2 TABLET(S): 8.6 TABLET ORAL at 21:29

## 2019-03-23 RX ADMIN — Medication 667 MILLIGRAM(S): at 17:50

## 2019-03-23 RX ADMIN — Medication 100 MILLIGRAM(S): at 17:50

## 2019-03-23 RX ADMIN — Medication 40 MILLIGRAM(S): at 06:39

## 2019-03-23 RX ADMIN — CHLORHEXIDINE GLUCONATE 1 APPLICATION(S): 213 SOLUTION TOPICAL at 17:49

## 2019-03-23 RX ADMIN — Medication 667 MILLIGRAM(S): at 21:30

## 2019-03-23 RX ADMIN — AMLODIPINE BESYLATE 10 MILLIGRAM(S): 2.5 TABLET ORAL at 06:39

## 2019-03-23 RX ADMIN — Medication 25 MILLIGRAM(S): at 06:39

## 2019-03-23 RX ADMIN — Medication 25 MILLIGRAM(S): at 21:30

## 2019-03-23 RX ADMIN — NYSTATIN CREAM 1 APPLICATION(S): 100000 CREAM TOPICAL at 17:50

## 2019-03-23 RX ADMIN — PANTOPRAZOLE SODIUM 40 MILLIGRAM(S): 20 TABLET, DELAYED RELEASE ORAL at 06:41

## 2019-03-23 RX ADMIN — CHLORHEXIDINE GLUCONATE 1 APPLICATION(S): 213 SOLUTION TOPICAL at 06:40

## 2019-03-23 RX ADMIN — Medication 667 MILLIGRAM(S): at 08:54

## 2019-03-23 RX ADMIN — HEPARIN SODIUM 5000 UNIT(S): 5000 INJECTION INTRAVENOUS; SUBCUTANEOUS at 17:50

## 2019-03-23 RX ADMIN — Medication 100 MILLIGRAM(S): at 06:39

## 2019-03-23 RX ADMIN — Medication 25 MILLIGRAM(S): at 13:40

## 2019-03-23 RX ADMIN — Medication 667 MILLIGRAM(S): at 12:37

## 2019-03-23 RX ADMIN — NYSTATIN CREAM 1 APPLICATION(S): 100000 CREAM TOPICAL at 06:41

## 2019-03-23 RX ADMIN — Medication 40 MILLIGRAM(S): at 17:50

## 2019-03-23 RX ADMIN — HEPARIN SODIUM 5000 UNIT(S): 5000 INJECTION INTRAVENOUS; SUBCUTANEOUS at 06:39

## 2019-03-23 NOTE — PROGRESS NOTE ADULT - SUBJECTIVE AND OBJECTIVE BOX
Patient is a 89y old  Female who presents with a chief complaint of shortness of breath (23 Mar 2019 07:46)    Pt is awake, alert, lying in bed in NAD.Pt refused BIPAP overnight. Denies cough/SOB at this moment.      INTERVAL HPI/OVERNIGHT EVENTS:      VITAL SIGNS:  T(F): 98.8 (03-23-19 @ 05:05)  HR: 78 (03-23-19 @ 05:05)  BP: 162/46 (03-23-19 @ 05:05)  RR: 18 (03-23-19 @ 05:05)  SpO2: 96% (03-23-19 @ 05:05)  Wt(kg): --  I&O's Detail          REVIEW OF SYSTEMS:    CONSTITUTIONAL:  No fevers, chills, sweats    HEENT:  Eyes:  No diplopia or blurred vision. ENT:  No earache, sore throat or runny nose.    CARDIOVASCULAR:  No pressure, squeezing, tightness, or heaviness about the chest; no palpitations.    RESPIRATORY:  Per HPI    GASTROINTESTINAL:  No abdominal pain, nausea, vomiting or diarrhea.    GENITOURINARY:  No dysuria, frequency or urgency.    NEUROLOGIC:  No paresthesias, fasciculations, seizures or weakness.    PSYCHIATRIC:  No disorder of thought or mood.      PHYSICAL EXAM:    Constitutional: Well developed and nourished  Eyes:Perrla  ENMT: normal  Neck:supple  Respiratory: Diminished breath sounds BL bases  Cardiovascular: S1 S2 regular  Gastrointestinal: Soft, Non tender  Extremities: No edema  Vascular:normal  Neurological:Awake, alert,Ox3  Musculoskeletal:Normal      MEDICATIONS  (STANDING):  amLODIPine   Tablet 10 milliGRAM(s) Oral daily  calcium acetate 667 milliGRAM(s) Oral four times a day with meals  chlorhexidine 4% Liquid 1 Application(s) Topical every 12 hours  docusate sodium 100 milliGRAM(s) Oral two times a day  furosemide   Injectable 40 milliGRAM(s) IV Push every 12 hours  heparin  Injectable 5000 Unit(s) SubCutaneous every 12 hours  hydrALAZINE 25 milliGRAM(s) Oral three times a day  nystatin Powder 1 Application(s) Topical every 12 hours  pantoprazole    Tablet 40 milliGRAM(s) Oral before breakfast  senna 2 Tablet(s) Oral at bedtime    MEDICATIONS  (PRN):  acetaminophen   Tablet .. 650 milliGRAM(s) Oral every 8 hours PRN Moderate Pain (4 - 6)  acetaminophen 300 mG/codeine 30 mG 1 Tablet(s) Oral every 4 hours PRN Severe Pain (7 - 10)  ALBUTerol    90 MICROgram(s) HFA Inhaler 2 Puff(s) Inhalation every 6 hours PRN Shortness of Breath and/or Wheezing  artificial  tears Solution 1 Drop(s) Both EYES every 6 hours PRN Dry Eyes      Allergies    iodine (Unknown)  meperidine (Rash)  oxycodone (Other; Nausea)    Intolerances        LABS:                        8.1    6.96  )-----------( 134      ( 22 Mar 2019 06:21 )             27.3     03-22    137  |  102  |  80<H>  ----------------------------<  87  4.9   |  31  |  1.99<H>    Ca    8.3<L>      22 Mar 2019 06:21    TPro  6.0  /  Alb  2.1<L>  /  TBili  0.2  /  DBili  x   /  AST  10  /  ALT  14  /  AlkPhos  43  03-22              CAPILLARY BLOOD GLUCOSE      POCT Blood Glucose.: 110 mg/dL (23 Mar 2019 08:09)    pro-bnp 15746 03-22 @ 06:21     d-dimer --  03-22 @ 06:21  pro-bnp 92446 03-18 @ 06:40     d-dimer --  03-18 @ 06:40      RADIOLOGY & ADDITIONAL TESTS:    CXR:  < from: Xray Chest 1 View- PORTABLE-Routine (03.20.19 @ 15:30) >    IMPRESSION: No significant interval change.    < end of copied text >    Ct scan chest:    ekg;    echo:  < from: Transthoracic Echocardiogram (03.14.19 @ 07:38) >  CONCLUSIONS:  1. Mild posterior mitral annular calcification. Mild to  moderate mitral regurgitation.  2. Calcified, probably trileaflet aortic valve with  decreased opening. Moderate aortic stenosis. Mild aortic  regurgitation.  3. Normal aortic root.  4. Severely dilated left atrium.  LA volume index = 73  cc/m2.  5. Severe concentric left ventricular hypertrophy.  6. Hyperdynamic left ventricular systolic function (EF  >70%).  7. Grade III diastolic dysfunction.  8. Mild right atrial enlargement.  9. Normal right ventricular size and systolic function  (TAPSE 2.6 cm).  10. RV systolic pressure is moderately increased at  49 mm  Hg.  11. Normal tricuspid valve. Moderate to severe tricuspid  regurgitation.  12. Pulmonic valve not well seen. Trace pulmonic  insufficiency is noted.  13. Trivial pericardial effusion is seen.  14. Left pleural effusion.    < end of copied text > Patient is a 89y old  Female who presents with a chief complaint of shortness of breath (23 Mar 2019 07:46)    Pt is awake, alert, lying in bed in NAD. Pt refused BIPAP overnight. Denies cough/SOB at this moment.      INTERVAL HPI/OVERNIGHT EVENTS:      VITAL SIGNS:  T(F): 98.8 (03-23-19 @ 05:05)  HR: 78 (03-23-19 @ 05:05)  BP: 162/46 (03-23-19 @ 05:05)  RR: 18 (03-23-19 @ 05:05)  SpO2: 96% (03-23-19 @ 05:05)  Wt(kg): --  I&O's Detail          REVIEW OF SYSTEMS:    CONSTITUTIONAL:  No fevers, chills, sweats    HEENT:  Eyes:  No diplopia or blurred vision. ENT:  No earache, sore throat or runny nose.    CARDIOVASCULAR:  No pressure, squeezing, tightness, or heaviness about the chest; no palpitations.    RESPIRATORY:  Per HPI    GASTROINTESTINAL:  No abdominal pain, nausea, vomiting or diarrhea.    GENITOURINARY:  No dysuria, frequency or urgency.    NEUROLOGIC:  No paresthesias, fasciculations, seizures or weakness.    PSYCHIATRIC:  No disorder of thought or mood.      PHYSICAL EXAM:    Constitutional: Well developed and nourished  Eyes:Perrla  ENMT: normal  Neck:supple  Respiratory: Diminished breath sounds BL bases  Cardiovascular: S1 S2 regular  Gastrointestinal: Soft, Non tender  Extremities: No edema  Vascular:normal  Neurological:Awake, alert,Ox3  Musculoskeletal:Normal      MEDICATIONS  (STANDING):  amLODIPine   Tablet 10 milliGRAM(s) Oral daily  calcium acetate 667 milliGRAM(s) Oral four times a day with meals  chlorhexidine 4% Liquid 1 Application(s) Topical every 12 hours  docusate sodium 100 milliGRAM(s) Oral two times a day  furosemide   Injectable 40 milliGRAM(s) IV Push every 12 hours  heparin  Injectable 5000 Unit(s) SubCutaneous every 12 hours  hydrALAZINE 25 milliGRAM(s) Oral three times a day  nystatin Powder 1 Application(s) Topical every 12 hours  pantoprazole    Tablet 40 milliGRAM(s) Oral before breakfast  senna 2 Tablet(s) Oral at bedtime    MEDICATIONS  (PRN):  acetaminophen   Tablet .. 650 milliGRAM(s) Oral every 8 hours PRN Moderate Pain (4 - 6)  acetaminophen 300 mG/codeine 30 mG 1 Tablet(s) Oral every 4 hours PRN Severe Pain (7 - 10)  ALBUTerol    90 MICROgram(s) HFA Inhaler 2 Puff(s) Inhalation every 6 hours PRN Shortness of Breath and/or Wheezing  artificial  tears Solution 1 Drop(s) Both EYES every 6 hours PRN Dry Eyes      Allergies    iodine (Unknown)  meperidine (Rash)  oxycodone (Other; Nausea)    Intolerances        LABS:                        8.1    6.96  )-----------( 134      ( 22 Mar 2019 06:21 )             27.3     03-22    137  |  102  |  80<H>  ----------------------------<  87  4.9   |  31  |  1.99<H>    Ca    8.3<L>      22 Mar 2019 06:21    TPro  6.0  /  Alb  2.1<L>  /  TBili  0.2  /  DBili  x   /  AST  10  /  ALT  14  /  AlkPhos  43  03-22              CAPILLARY BLOOD GLUCOSE      POCT Blood Glucose.: 110 mg/dL (23 Mar 2019 08:09)    pro-bnp 76162 03-22 @ 06:21     d-dimer --  03-22 @ 06:21  pro-bnp 57085 03-18 @ 06:40     d-dimer --  03-18 @ 06:40      RADIOLOGY & ADDITIONAL TESTS:    CXR:  < from: Xray Chest 1 View- PORTABLE-Routine (03.20.19 @ 15:30) >    IMPRESSION: No significant interval change.    < end of copied text >    Ct scan chest:    ekg;    echo:  < from: Transthoracic Echocardiogram (03.14.19 @ 07:38) >  CONCLUSIONS:  1. Mild posterior mitral annular calcification. Mild to  moderate mitral regurgitation.  2. Calcified, probably trileaflet aortic valve with  decreased opening. Moderate aortic stenosis. Mild aortic  regurgitation.  3. Normal aortic root.  4. Severely dilated left atrium.  LA volume index = 73  cc/m2.  5. Severe concentric left ventricular hypertrophy.  6. Hyperdynamic left ventricular systolic function (EF  >70%).  7. Grade III diastolic dysfunction.  8. Mild right atrial enlargement.  9. Normal right ventricular size and systolic function  (TAPSE 2.6 cm).  10. RV systolic pressure is moderately increased at  49 mm  Hg.  11. Normal tricuspid valve. Moderate to severe tricuspid  regurgitation.  12. Pulmonic valve not well seen. Trace pulmonic  insufficiency is noted.  13. Trivial pericardial effusion is seen.  14. Left pleural effusion.    < end of copied text >

## 2019-03-23 NOTE — PROGRESS NOTE ADULT - SUBJECTIVE AND OBJECTIVE BOX
CHIEF COMPLAINT:Patient is a 89y old  Female who presents with a chief complaint of shortness of breath (23 Mar 2019 12:06)    	  REVIEW OF SYSTEMS:  CONSTITUTIONAL: No fever, weight loss, or fatigue  EYES: No eye pain, visual disturbances, or discharge  ENMT:  No difficulty hearing, tinnitus, vertigo; No sinus or throat pain  NECK: No pain or stiffness  RESPIRATORY: No cough, wheezing, chills or hemoptysis; No Shortness of Breath  CARDIOVASCULAR: No chest pain, palpitations, passing out, dizziness, or leg swelling  GASTROINTESTINAL: No abdominal or epigastric pain. No nausea, vomiting, or hematemesis; No diarrhea or constipation. No melena or hematochezia.  GENITOURINARY: No dysuria, frequency, hematuria, or incontinence  NEUROLOGICAL: No headaches, memory loss, loss of strength, numbness, or tremors  SKIN: No itching, burning, rashes, or lesions   LYMPH Nodes: No enlarged glands  ENDOCRINE: No heat or cold intolerance; No hair loss  MUSCULOSKELETAL: No joint pain or swelling; No muscle, back, or extremity pain  PSYCHIATRIC: No depression, anxiety, mood swings, or difficulty sleeping  HEME/LYMPH: No easy bruising, or bleeding gums  ALLERY AND IMMUNOLOGIC: No hives or eczema	    [ ] All others negative	  [ ] Unable to obtain    PHYSICAL EXAM:  T(C): 37.2 (03-23-19 @ 13:00), Max: 37.5 (03-22-19 @ 21:17)  HR: 66 (03-23-19 @ 17:45) (66 - 78)  BP: 146/50 (03-23-19 @ 17:45) (146/50 - 162/46)  RR: 16 (03-23-19 @ 13:00) (16 - 18)  SpO2: 96% (03-23-19 @ 05:05) (96% - 100%)  Wt(kg): --  I&O's Summary      	  HEENT:   Normal oral mucosa, PERRL, EOMI	  Lymphatic: No lymphadenopathy  Cardiovascular: Normal S1 S2, No JVD, No murmurs, No edema  Respiratory: Lungs clear to auscultation	  Psychiatry: A & O x 2-3  Gastrointestinal:  Soft, Non-tender, + BS	  Skin: No rashes, No ecchymoses, No cyanosis	  Neurologic: Non-focal  Extremities: Normal range of motion, No clubbing, cyanosis or edema  Vascular: Peripheral pulses palpable 2+ bilaterally    MEDICATIONS  (STANDING):  amLODIPine   Tablet 10 milliGRAM(s) Oral daily  calcium acetate 667 milliGRAM(s) Oral four times a day with meals  chlorhexidine 4% Liquid 1 Application(s) Topical every 12 hours  docusate sodium 100 milliGRAM(s) Oral two times a day  furosemide   Injectable 40 milliGRAM(s) IV Push every 12 hours  heparin  Injectable 5000 Unit(s) SubCutaneous every 12 hours  hydrALAZINE 25 milliGRAM(s) Oral three times a day  nystatin Powder 1 Application(s) Topical every 12 hours  pantoprazole    Tablet 40 milliGRAM(s) Oral before breakfast  senna 2 Tablet(s) Oral at bedtime      TELEMETRY: 	    ECG:  	  RADIOLOGY:  OTHER: 	  	  CBC Full  -  ( 22 Mar 2019 06:21 )  WBC Count : 6.96 K/uL  Hemoglobin : 8.1 g/dL  Hematocrit : 27.3 %  Platelet Count - Automated : 134 K/uL  Mean Cell Volume : 101.1 fl  Mean Cell Hemoglobin : 30.0 pg  Mean Cell Hemoglobin Concentration : 29.7 gm/dL  Auto Neutrophil # : x  Auto Lymphocyte # : x  Auto Monocyte # : x  Auto Eosinophil # : x  Auto Basophil # : x  Auto Neutrophil % : x  Auto Lymphocyte % : x  Auto Monocyte % : x  Auto Eosinophil % : x  Auto Basophil % : x        CARDIAC MARKERS:                              8.1    6.96  )-----------( 134      ( 22 Mar 2019 06:21 )             27.3       03-22    137  |  102  |  80<H>  ----------------------------<  87  4.9   |  31  |  1.99<H>    Ca    8.3<L>      22 Mar 2019 06:21    TPro  6.0  /  Alb  2.1<L>  /  TBili  0.2  /  DBili  x   /  AST  10  /  ALT  14  /  AlkPhos  43  03-22            proBNP: Serum Pro-Brain Natriuretic Peptide: 21830 pg/mL (03-22 @ 06:21)  Serum Pro-Brain Natriuretic Peptide: 42771 pg/mL (03-18 @ 06:40)  Serum Pro-Brain Natriuretic Peptide: 8592 pg/mL (03-13 @ 13:16)    Lipid Profile: Cholesterol 146  LDL 38    TG 25    HgA1c: Hemoglobin A1C, Whole Blood: 4.8 % (03-14 @ 10:39)    TSH: Thyroid Stimulating Hormone, Serum: 3.74 uU/mL (03-14 @ 06:39)

## 2019-03-23 NOTE — PROGRESS NOTE ADULT - ASSESSMENT
89 y/o F from [facility] w/ PMHx of GERD, HTN, anemia, colitis, rectal bleeding, chronic ankle swelling, presents to ED due to shortness of breath for 2 days, admitted for management of hypercapnic respiratory failure likely secondary to pulmonary edema and placed on NIV BIPAP. Patient has made herself a DNR with trial of intubation if necessary. Patient refusing to use BIPAP.      Problem/Plan - 1:  ·  Problem: Acute hypercapnic respiratory failure.  Plan: Continue oxygen.  Encourage BIPAP usage at nighttime.  Patient may need NIV upon discharge due to severe kyphoscoliosis which is restricting her thoracic expansion. NIV will give her a pre-set tidal volume which would help resolve her hypercapnia and decrease readmission rate.      Problem/Plan - 2:  ·  Problem: Acute on chronic diastolic heart failure.  Plan: Continue diuretics and other cardiac meds as per cardio.      Problem/Plan - 3:  ·  Problem: Severe anemia.  Plan: Continue to monitor.  H&H stable.      Problem/Plan - 4:  ·  Problem: Need for prophylactic measure.  Plan: Continue GI and DVT prophylaxis. 89 y/o F from [facility] w/ PMHx of GERD, HTN, anemia, colitis, rectal bleeding, chronic ankle swelling, presents to ED due to shortness of breath for 2 days, admitted for management of hypercapnic respiratory failure likely secondary to pulmonary edema and placed on NIV BIPAP. Patient has made herself a DNR with trial of intubation if necessary. Patient refusing to use BIPAP.      Problem/Plan - 1:  ·  Problem: Acute hypercapnic respiratory failure.  Plan: Continue oxygen.  Encourage BIPAP usage at nighttime.  Patient may need NIV upon discharge due to severe kyphoscoliosis which is restricting her thoracic expansion. NIV will give her a pre-set tidal volume which would help resolve her hypercapnia and decrease readmission rate.      Problem/Plan - 2:  ·  Problem: Acute on chronic diastolic heart failure.  Plan:   Continue diuretics- IV lasix 40 mg Q12.  cardiology - DR Medina     Problem/Plan - 3:  ·  Problem: Severe anemia.  Plan:   H&H stable.   will repeat labs in AM     Problem/Plan - 4:  ·  Problem: Need for prophylactic measure.  Plan:   Continue GI and DVT prophylaxis.

## 2019-03-23 NOTE — PROGRESS NOTE ADULT - SUBJECTIVE AND OBJECTIVE BOX
Patient is a 89y old  Female who presents with a chief complaint of shortness of breath (22 Mar 2019 14:16)      INTERVAL HPI/OVERNIGHT EVENTS:  Patient seen and examined at bedside. Denies chest pain, palpitation, SOB, nausea, vomiting, abdominal pain.    T(C): 37.1 (03-23-19 @ 05:05), Max: 37.5 (03-22-19 @ 21:17)  HR: 78 (03-23-19 @ 05:05) (72 - 78)  BP: 162/46 (03-23-19 @ 05:05) (146/41 - 162/46)  RR: 18 (03-23-19 @ 05:05) (16 - 18)  SpO2: 96% (03-23-19 @ 05:05) (96% - 100%)  Wt(kg): --  I&O's Summary        REVIEW OF SYSTEMS:  No fever,   No cough, SOB  No chest pain, palpitations  No Abd pain, nausea, vomiting, No diarrhea or constipation      PHYSICAL EXAM:    PHYSICAL EXAM:    GENERAL: NAD, well-groomed, well-developed  HEAD:  Atraumatic, Normocephalic  EYES: EOMI, PERRLA, conjunctiva and sclera clear  ENMT: No tonsillar erythema, exudates  NECK: Supple, No JVD  NERVOUS SYSTEM:  Alert & Oriented X3, no new deficits  CHEST/LUNG: Diminished breath sounds BL bases  HEART: Regular rate and rhythm; No murmurs, rubs, or gallops  ABDOMEN: Soft, Nontender, Nondistended; Bowel sounds present  EXTREMITIES:  2+ Peripheral Pulses, No clubbing, cyanosis, or edema  LYMPH: No lymphadenopathy noted  SKIN: No rashes or lesions  +Severe kyphoscoliosis    LABS:                        8.1    6.96  )-----------( 134      ( 22 Mar 2019 06:21 )             27.3     03-22    137  |  102  |  80<H>  ----------------------------<  87  4.9   |  31  |  1.99<H>    Ca    8.3<L>      22 Mar 2019 06:21    TPro  6.0  /  Alb  2.1<L>  /  TBili  0.2  /  DBili  x   /  AST  10  /  ALT  14  /  AlkPhos  43  03-22      CAPILLARY BLOOD GLUCOSE              MEDICATIONS  (STANDING):  amLODIPine   Tablet 10 milliGRAM(s) Oral daily  calcium acetate 667 milliGRAM(s) Oral four times a day with meals  chlorhexidine 4% Liquid 1 Application(s) Topical every 12 hours  docusate sodium 100 milliGRAM(s) Oral two times a day  furosemide   Injectable 40 milliGRAM(s) IV Push every 12 hours  heparin  Injectable 5000 Unit(s) SubCutaneous every 12 hours  hydrALAZINE 25 milliGRAM(s) Oral three times a day  nystatin Powder 1 Application(s) Topical every 12 hours  pantoprazole    Tablet 40 milliGRAM(s) Oral before breakfast  senna 2 Tablet(s) Oral at bedtime    MEDICATIONS  (PRN):  acetaminophen   Tablet .. 650 milliGRAM(s) Oral every 8 hours PRN Moderate Pain (4 - 6)  acetaminophen 300 mG/codeine 30 mG 1 Tablet(s) Oral every 4 hours PRN Severe Pain (7 - 10)  ALBUTerol    90 MICROgram(s) HFA Inhaler 2 Puff(s) Inhalation every 6 hours PRN Shortness of Breath and/or Wheezing  artificial  tears Solution 1 Drop(s) Both EYES every 6 hours PRN Dry Eyes      Radiology: Patient is a 89y old  Female who presents with a chief complaint of shortness of breath (22 Mar 2019 14:16)      INTERVAL HPI/OVERNIGHT EVENTS:  Patient seen and examined at bedside. Denies any new symptoms. Pt refused BIPAP overnight. This AM she is alert but was sleepy.     T(C): 37.1 (03-23-19 @ 05:05), Max: 37.5 (03-22-19 @ 21:17)  HR: 78 (03-23-19 @ 05:05) (72 - 78)  BP: 162/46 (03-23-19 @ 05:05) (146/41 - 162/46)  RR: 18 (03-23-19 @ 05:05) (16 - 18)  SpO2: 96% (03-23-19 @ 05:05) (96% - 100%)  Wt(kg): --  I&O's Summary        REVIEW OF SYSTEMS:  No fever,   No cough, SOB  No chest pain, palpitations  No Abd pain, nausea, vomiting, No diarrhea or constipation      PHYSICAL EXAM:    PHYSICAL EXAM:    GENERAL: NAD, well-groomed, well-developed  HEAD:  Atraumatic, Normocephalic  EYES: EOMI, PERRLA, conjunctiva and sclera clear  ENMT: No tonsillar erythema, exudates  NECK: Supple, No JVD  NERVOUS SYSTEM:  Alert , awake but sleepy intermittently, no new deficits  CHEST/LUNG: Diminished breath sounds BL bases  HEART: Regular rate and rhythm; No murmurs, rubs, or gallops  ABDOMEN: Soft, Nontender, Nondistended; Bowel sounds present  EXTREMITIES:  2+ Peripheral Pulses, No clubbing, cyanosis, or edema  +Severe kyphoscoliosis    LABS:                        8.1    6.96  )-----------( 134      ( 22 Mar 2019 06:21 )             27.3     03-22    137  |  102  |  80<H>  ----------------------------<  87  4.9   |  31  |  1.99<H>    Ca    8.3<L>      22 Mar 2019 06:21    TPro  6.0  /  Alb  2.1<L>  /  TBili  0.2  /  DBili  x   /  AST  10  /  ALT  14  /  AlkPhos  43  03-22      CAPILLARY BLOOD GLUCOSE              MEDICATIONS  (STANDING):  amLODIPine   Tablet 10 milliGRAM(s) Oral daily  calcium acetate 667 milliGRAM(s) Oral four times a day with meals  chlorhexidine 4% Liquid 1 Application(s) Topical every 12 hours  docusate sodium 100 milliGRAM(s) Oral two times a day  furosemide   Injectable 40 milliGRAM(s) IV Push every 12 hours  heparin  Injectable 5000 Unit(s) SubCutaneous every 12 hours  hydrALAZINE 25 milliGRAM(s) Oral three times a day  nystatin Powder 1 Application(s) Topical every 12 hours  pantoprazole    Tablet 40 milliGRAM(s) Oral before breakfast  senna 2 Tablet(s) Oral at bedtime    MEDICATIONS  (PRN):  acetaminophen   Tablet .. 650 milliGRAM(s) Oral every 8 hours PRN Moderate Pain (4 - 6)  acetaminophen 300 mG/codeine 30 mG 1 Tablet(s) Oral every 4 hours PRN Severe Pain (7 - 10)  ALBUTerol    90 MICROgram(s) HFA Inhaler 2 Puff(s) Inhalation every 6 hours PRN Shortness of Breath and/or Wheezing  artificial  tears Solution 1 Drop(s) Both EYES every 6 hours PRN Dry Eyes      Radiology: NO NEW IMAGING STUDIES PERFORMED TODAY.

## 2019-03-23 NOTE — PROGRESS NOTE ADULT - ASSESSMENT
1. Renal insufficiency  Chronic  Monitor labs.  Renal F/U  Renal US noted.   Continue meds  Avoid nephrotoxic drugs.  [place] cath in place    2. Hypercapnic respiratory failure  Likely 2nd to pulmonary edema.    XR 3/20 no significant change.  Bi-pap QHS, on O2 NC.   Bronchodilators  IV Lasix    DVT PPX  Follow up CXR    3. Anemia  Monitor H/H; today 8.3  Had 1u PRBC  Transfuse PRN  GI PPX    4. CHF  Diuretics  Cardio F/U  I&O's 1. Renal insufficiency  Chronic  Monitor labs.  Renal F/U  Renal US noted.   Continue meds  Avoid nephrotoxic drugs.  De La Cruz cath in place    2. Hypercapnic respiratory failure  Likely 2nd to Copd vs CHF    XR 3/20 no significant change.  Bi-pap QHS, on O2 NC.   Bronchodilators  LABA/inhaled steroids  IV Lasix    DVT PPX  Follow up CXR    3. Anemia  Monitor H/H; today 8.3  Had 1u PRBC  Transfuse PRN  GI PPX    4. CHF  Diuretics  Cardio F/U  I&O's     5.Pulmonary HTN  Bronchodilators  oxygen supp  CCB

## 2019-03-23 NOTE — PROGRESS NOTE ADULT - SUBJECTIVE AND OBJECTIVE BOX
CHIEF COMPLAINT:Patient is a 89y old  Female who presents with a chief complaint of shortness of breath.Pt appears comfortable.    	  REVIEW OF SYSTEMS:  CONSTITUTIONAL: No fever, weight loss, or fatigue  EYES: No eye pain, visual disturbances, or discharge  ENT:  No difficulty hearing, tinnitus, vertigo; No sinus or throat pain  NECK: No pain or stiffness  RESPIRATORY: No cough, wheezing, chills or hemoptysis; No Shortness of Breath  CARDIOVASCULAR: No chest pain, palpitations, passing out, dizziness, or leg swelling  GASTROINTESTINAL: No abdominal or epigastric pain. No nausea, vomiting, or hematemesis; No diarrhea or constipation. No melena or hematochezia.  GENITOURINARY: No dysuria, frequency, hematuria, or incontinence  NEUROLOGICAL: No headaches, memory loss, loss of strength, numbness, or tremors  SKIN: No itching, burning, rashes, or lesions   LYMPH Nodes: No enlarged glands  ENDOCRINE: No heat or cold intolerance; No hair loss  MUSCULOSKELETAL: No joint pain or swelling; No muscle, back, or extremity pain  PSYCHIATRIC: No depression, anxiety, mood swings, or difficulty sleeping  HEME/LYMPH: No easy bruising, or bleeding gums  ALLERGY AND IMMUNOLOGIC: No hives or eczema	      PHYSICAL EXAM:  T(C): 37.1 (03-23-19 @ 05:05), Max: 37.5 (03-22-19 @ 21:17)  HR: 78 (03-23-19 @ 05:05) (72 - 78)  BP: 162/46 (03-23-19 @ 05:05) (146/41 - 162/46)  RR: 18 (03-23-19 @ 05:05) (16 - 18)  SpO2: 96% (03-23-19 @ 05:05) (96% - 100%)  Wt(kg): --  I&O's Summary      Appearance: Normal	  HEENT:   Normal oral mucosa, PERRL, EOMI	  Lymphatic: No lymphadenopathy  Cardiovascular: Normal S1 S2, No JVD, No murmurs, No edema  Respiratory: Lungs clear to auscultation	  Psychiatry: A & O x 3, Mood & affect appropriate  Gastrointestinal:  Soft, Non-tender, + BS	  Skin: No rashes, No ecchymoses, No cyanosis	  Neurologic: Non-focal  Extremities: Normal range of motion, No clubbing, cyanosis or edema  Vascular: Peripheral pulses palpable 2+ bilaterally    MEDICATIONS  (STANDING):  amLODIPine   Tablet 10 milliGRAM(s) Oral daily  calcium acetate 667 milliGRAM(s) Oral four times a day with meals  chlorhexidine 4% Liquid 1 Application(s) Topical every 12 hours  docusate sodium 100 milliGRAM(s) Oral two times a day  furosemide   Injectable 40 milliGRAM(s) IV Push every 12 hours  heparin  Injectable 5000 Unit(s) SubCutaneous every 12 hours  hydrALAZINE 25 milliGRAM(s) Oral three times a day  nystatin Powder 1 Application(s) Topical every 12 hours  pantoprazole    Tablet 40 milliGRAM(s) Oral before breakfast  senna 2 Tablet(s) Oral at bedtime      LABS:	 	                        8.1    6.96  )-----------( 134      ( 22 Mar 2019 06:21 )             27.3     03-22    137  |  102  |  80<H>  ----------------------------<  87  4.9   |  31  |  1.99<H>    Ca    8.3<L>      22 Mar 2019 06:21    TPro  6.0  /  Alb  2.1<L>  /  TBili  0.2  /  DBili  x   /  AST  10  /  ALT  14  /  AlkPhos  43  03-22    proBNP: Serum Pro-Brain Natriuretic Peptide: 92447 pg/mL (03-22 @ 06:21)  Serum Pro-Brain Natriuretic Peptide: 72122 pg/mL (03-18 @ 06:40)  Serum Pro-Brain Natriuretic Peptide: 8592 pg/mL (03-13 @ 13:16)    Lipid Profile: Cholesterol 146  LDL 38    TG 25    HgA1c: Hemoglobin A1C, Whole Blood: 4.8 % (03-14 @ 10:39)    TSH: Thyroid Stimulating Hormone, Serum: 3.74 uU/mL (03-14 @ 06:39)

## 2019-03-24 LAB
ANION GAP SERPL CALC-SCNC: 7 MMOL/L — SIGNIFICANT CHANGE UP (ref 5–17)
BASOPHILS # BLD AUTO: 0.01 K/UL — SIGNIFICANT CHANGE UP (ref 0–0.2)
BASOPHILS NFR BLD AUTO: 0.2 % — SIGNIFICANT CHANGE UP (ref 0–2)
BUN SERPL-MCNC: 83 MG/DL — HIGH (ref 7–18)
CALCIUM SERPL-MCNC: 8.7 MG/DL — SIGNIFICANT CHANGE UP (ref 8.4–10.5)
CHLORIDE SERPL-SCNC: 101 MMOL/L — SIGNIFICANT CHANGE UP (ref 96–108)
CO2 SERPL-SCNC: 31 MMOL/L — SIGNIFICANT CHANGE UP (ref 22–31)
CREAT SERPL-MCNC: 1.95 MG/DL — HIGH (ref 0.5–1.3)
EOSINOPHIL # BLD AUTO: 0.02 K/UL — SIGNIFICANT CHANGE UP (ref 0–0.5)
EOSINOPHIL NFR BLD AUTO: 0.3 % — SIGNIFICANT CHANGE UP (ref 0–6)
GLUCOSE SERPL-MCNC: 96 MG/DL — SIGNIFICANT CHANGE UP (ref 70–99)
HCT VFR BLD CALC: 27 % — LOW (ref 34.5–45)
HGB BLD-MCNC: 8.4 G/DL — LOW (ref 11.5–15.5)
IMM GRANULOCYTES NFR BLD AUTO: 0.8 % — SIGNIFICANT CHANGE UP (ref 0–1.5)
LYMPHOCYTES # BLD AUTO: 0.72 K/UL — LOW (ref 1–3.3)
LYMPHOCYTES # BLD AUTO: 11.7 % — LOW (ref 13–44)
MAGNESIUM SERPL-MCNC: 2.1 MG/DL — SIGNIFICANT CHANGE UP (ref 1.6–2.6)
MCHC RBC-ENTMCNC: 31.1 GM/DL — LOW (ref 32–36)
MCHC RBC-ENTMCNC: 31.2 PG — SIGNIFICANT CHANGE UP (ref 27–34)
MCV RBC AUTO: 100.4 FL — HIGH (ref 80–100)
MONOCYTES # BLD AUTO: 0.86 K/UL — SIGNIFICANT CHANGE UP (ref 0–0.9)
MONOCYTES NFR BLD AUTO: 13.9 % — SIGNIFICANT CHANGE UP (ref 2–14)
NEUTROPHILS # BLD AUTO: 4.52 K/UL — SIGNIFICANT CHANGE UP (ref 1.8–7.4)
NEUTROPHILS NFR BLD AUTO: 73.1 % — SIGNIFICANT CHANGE UP (ref 43–77)
NRBC # BLD: 0 /100 WBCS — SIGNIFICANT CHANGE UP (ref 0–0)
PHOSPHATE SERPL-MCNC: 3.5 MG/DL — SIGNIFICANT CHANGE UP (ref 2.5–4.5)
PLATELET # BLD AUTO: 151 K/UL — SIGNIFICANT CHANGE UP (ref 150–400)
POTASSIUM SERPL-MCNC: 4.7 MMOL/L — SIGNIFICANT CHANGE UP (ref 3.5–5.3)
POTASSIUM SERPL-SCNC: 4.7 MMOL/L — SIGNIFICANT CHANGE UP (ref 3.5–5.3)
RBC # BLD: 2.69 M/UL — LOW (ref 3.8–5.2)
RBC # FLD: 14.6 % — HIGH (ref 10.3–14.5)
SODIUM SERPL-SCNC: 139 MMOL/L — SIGNIFICANT CHANGE UP (ref 135–145)
WBC # BLD: 6.18 K/UL — SIGNIFICANT CHANGE UP (ref 3.8–10.5)
WBC # FLD AUTO: 6.18 K/UL — SIGNIFICANT CHANGE UP (ref 3.8–10.5)

## 2019-03-24 RX ADMIN — Medication 667 MILLIGRAM(S): at 13:36

## 2019-03-24 RX ADMIN — HEPARIN SODIUM 5000 UNIT(S): 5000 INJECTION INTRAVENOUS; SUBCUTANEOUS at 17:27

## 2019-03-24 RX ADMIN — Medication 100 MILLIGRAM(S): at 05:35

## 2019-03-24 RX ADMIN — CHLORHEXIDINE GLUCONATE 1 APPLICATION(S): 213 SOLUTION TOPICAL at 17:27

## 2019-03-24 RX ADMIN — Medication 667 MILLIGRAM(S): at 09:54

## 2019-03-24 RX ADMIN — PANTOPRAZOLE SODIUM 40 MILLIGRAM(S): 20 TABLET, DELAYED RELEASE ORAL at 06:43

## 2019-03-24 RX ADMIN — Medication 25 MILLIGRAM(S): at 05:35

## 2019-03-24 RX ADMIN — Medication 25 MILLIGRAM(S): at 13:36

## 2019-03-24 RX ADMIN — HEPARIN SODIUM 5000 UNIT(S): 5000 INJECTION INTRAVENOUS; SUBCUTANEOUS at 05:34

## 2019-03-24 RX ADMIN — Medication 25 MILLIGRAM(S): at 21:38

## 2019-03-24 RX ADMIN — NYSTATIN CREAM 1 APPLICATION(S): 100000 CREAM TOPICAL at 17:26

## 2019-03-24 RX ADMIN — NYSTATIN CREAM 1 APPLICATION(S): 100000 CREAM TOPICAL at 05:35

## 2019-03-24 RX ADMIN — SENNA PLUS 2 TABLET(S): 8.6 TABLET ORAL at 21:37

## 2019-03-24 RX ADMIN — Medication 40 MILLIGRAM(S): at 17:27

## 2019-03-24 RX ADMIN — Medication 100 MILLIGRAM(S): at 17:26

## 2019-03-24 RX ADMIN — AMLODIPINE BESYLATE 10 MILLIGRAM(S): 2.5 TABLET ORAL at 05:35

## 2019-03-24 RX ADMIN — Medication 40 MILLIGRAM(S): at 05:33

## 2019-03-24 RX ADMIN — Medication 667 MILLIGRAM(S): at 21:38

## 2019-03-24 RX ADMIN — Medication 667 MILLIGRAM(S): at 17:26

## 2019-03-24 RX ADMIN — Medication 650 MILLIGRAM(S): at 18:26

## 2019-03-24 RX ADMIN — Medication 650 MILLIGRAM(S): at 17:26

## 2019-03-24 RX ADMIN — CHLORHEXIDINE GLUCONATE 1 APPLICATION(S): 213 SOLUTION TOPICAL at 05:35

## 2019-03-24 NOTE — PROGRESS NOTE ADULT - SUBJECTIVE AND OBJECTIVE BOX
CHIEF COMPLAINT:Patient is a 89y old  Female who presents with a chief complaint of shortness of breath.Pt is on Bipap.    	  REVIEW OF SYSTEMS:  [x ] Unable to obtain    PHYSICAL EXAM:  T(C): 36.6 (03-24-19 @ 04:45), Max: 37.8 (03-23-19 @ 20:49)  HR: 71 (03-24-19 @ 09:30) (66 - 74)  BP: 149/52 (03-24-19 @ 04:45) (146/50 - 153/76)  RR: 16 (03-24-19 @ 04:45) (16 - 18)  SpO2: 100% (03-24-19 @ 09:30) (100% - 100%)  Wt(kg): --  I&O's Summary      Appearance: Normal	  HEENT:   Normal oral mucosa, PERRL, EOMI	  Lymphatic: No lymphadenopathy  Cardiovascular: Normal S1 S2, No JVD, No murmurs, No edema  Respiratory: Dec BS at bases  Gastrointestinal:  Soft, Non-tender, + BS	  Skin: No rashes, No ecchymoses, No cyanosis	  Extremities: Normal range of motion, No clubbing, cyanosis or edema  Vascular: Peripheral pulses palpable 2+ bilaterally    MEDICATIONS  (STANDING):  amLODIPine   Tablet 10 milliGRAM(s) Oral daily  calcium acetate 667 milliGRAM(s) Oral four times a day with meals  chlorhexidine 4% Liquid 1 Application(s) Topical every 12 hours  docusate sodium 100 milliGRAM(s) Oral two times a day  furosemide   Injectable 40 milliGRAM(s) IV Push every 12 hours  heparin  Injectable 5000 Unit(s) SubCutaneous every 12 hours  hydrALAZINE 25 milliGRAM(s) Oral three times a day  nystatin Powder 1 Application(s) Topical every 12 hours  pantoprazole    Tablet 40 milliGRAM(s) Oral before breakfast  senna 2 Tablet(s) Oral at bedtime        	  LABS:	 	                        8.4    6.18  )-----------( 151      ( 24 Mar 2019 08:32 )             27.0     03-24    139  |  101  |  83<H>  ----------------------------<  96  4.7   |  31  |  1.95<H>    Ca    8.7      24 Mar 2019 08:32  Phos  3.5     03-24  Mg     2.1     03-24      proBNP: Serum Pro-Brain Natriuretic Peptide: 33828 pg/mL (03-22 @ 06:21)  Serum Pro-Brain Natriuretic Peptide: 74845 pg/mL (03-18 @ 06:40)  Serum Pro-Brain Natriuretic Peptide: 8592 pg/mL (03-13 @ 13:16)    Lipid Profile: Cholesterol 146  LDL 38    TG 25    HgA1c: Hemoglobin A1C, Whole Blood: 4.8 % (03-14 @ 10:39)    TSH: Thyroid Stimulating Hormone, Serum: 3.74 uU/mL (03-14 @ 06:39)

## 2019-03-24 NOTE — PROGRESS NOTE ADULT - SUBJECTIVE AND OBJECTIVE BOX
Patient is a 89y old  Female who presents with a chief complaint of shortness of breath (23 Mar 2019 12:06)      INTERVAL/ OVERNIGHT EVENTS: no new complaints overnight. pt refused BIPAP at night, had low grade temp of 100.1    MEDICATIONS  (STANDING):  amLODIPine   Tablet 10 milliGRAM(s) Oral daily  calcium acetate 667 milliGRAM(s) Oral four times a day with meals  chlorhexidine 4% Liquid 1 Application(s) Topical every 12 hours  docusate sodium 100 milliGRAM(s) Oral two times a day  furosemide   Injectable 40 milliGRAM(s) IV Push every 12 hours  heparin  Injectable 5000 Unit(s) SubCutaneous every 12 hours  hydrALAZINE 25 milliGRAM(s) Oral three times a day  nystatin Powder 1 Application(s) Topical every 12 hours  pantoprazole    Tablet 40 milliGRAM(s) Oral before breakfast  senna 2 Tablet(s) Oral at bedtime    MEDICATIONS  (PRN):  acetaminophen   Tablet .. 650 milliGRAM(s) Oral every 8 hours PRN Moderate Pain (4 - 6)  acetaminophen 300 mG/codeine 30 mG 1 Tablet(s) Oral every 4 hours PRN Severe Pain (7 - 10)  ALBUTerol    90 MICROgram(s) HFA Inhaler 2 Puff(s) Inhalation every 6 hours PRN Shortness of Breath and/or Wheezing  artificial  tears Solution 1 Drop(s) Both EYES every 6 hours PRN Dry Eyes      __________________________________________________    Vital Signs Last 24 Hrs  T(C): 37.8 (23 Mar 2019 20:49), Max: 37.8 (23 Mar 2019 20:49)  T(F): 100.1 (23 Mar 2019 20:49), Max: 100.1 (23 Mar 2019 20:49)  HR: 74 (23 Mar 2019 20:49) (66 - 78)  BP: 148/49 (23 Mar 2019 20:49) (146/50 - 162/46)  BP(mean): --  RR: 18 (23 Mar 2019 20:49) (16 - 18)  SpO2: 100% (23 Mar 2019 20:49) (96% - 100%)    ________________________________________________  PHYSICAL EXAM:  GENERAL: NAD, well-groomed, well-developed  HEAD:  Atraumatic, Normocephalic  EYES: EOMI, PERRLA, conjunctiva and sclera clear  ENMT: No tonsillar erythema, exudates  NECK: Supple, No JVD  NERVOUS SYSTEM:  Alert , awake but sleepy intermittently, no new deficits  CHEST/LUNG: Diminished breath sounds BL bases  HEART: Regular rate and rhythm; No murmurs, rubs, or gallops  ABDOMEN: Soft, Nontender, Nondistended; Bowel sounds present  EXTREMITIES:  2+ Peripheral Pulses, No clubbing, cyanosis, or edema  _________________________________________________  LABS:                        8.1    6.96  )-----------( 134      ( 22 Mar 2019 06:21 )             27.3     03-22    137  |  102  |  80<H>  ----------------------------<  87  4.9   |  31  |  1.99<H>    Ca    8.3<L>      22 Mar 2019 06:21    TPro  6.0  /  Alb  2.1<L>  /  TBili  0.2  /  DBili  x   /  AST  10  /  ALT  14  /  AlkPhos  43  03-22        CAPILLARY BLOOD GLUCOSE      POCT Blood Glucose.: 110 mg/dL (23 Mar 2019 08:09)        RADIOLOGY & ADDITIONAL TESTS: NO NEW IMAGING STUDIES PERFORMED TODAY.

## 2019-03-24 NOTE — PROGRESS NOTE ADULT - SUBJECTIVE AND OBJECTIVE BOX
Patient is a 89y old  Female who presents with a chief complaint of shortness of breath (24 Mar 2019 01:57)  Awake, alert, comfortable in bed in NAD. No cough or sob at rest    INTERVAL HPI/OVERNIGHT EVENTS:      VITAL SIGNS:  T(F): 97.8 (03-24-19 @ 04:45)  HR: 71 (03-24-19 @ 09:30)  BP: 149/52 (03-24-19 @ 04:45)  RR: 16 (03-24-19 @ 04:45)  SpO2: 100% (03-24-19 @ 09:30)  Wt(kg): --  I&O's Detail          REVIEW OF SYSTEMS:    CONSTITUTIONAL:  No fevers, chills, sweats    HEENT:  Eyes:  No diplopia or blurred vision. ENT:  No earache, sore throat or runny nose.    CARDIOVASCULAR:  No pressure, squeezing, tightness, or heaviness about the chest; no palpitations.    RESPIRATORY:  Per HPI    GASTROINTESTINAL:  No abdominal pain, nausea, vomiting or diarrhea.    GENITOURINARY:  No dysuria, frequency or urgency.    NEUROLOGIC:  No paresthesias, fasciculations, seizures or weakness.    PSYCHIATRIC:  No disorder of thought or mood.      PHYSICAL EXAM:    Constitutional: Well developed and nourished  Eyes:Perrla  ENMT: normal  Neck:supple  Respiratory: good air entry  Cardiovascular: S1 S2 regular  Gastrointestinal: Soft, Non tender  Extremities: No edema  Vascular:normal  Neurological:Awake, alert,Ox3  Musculoskeletal:Normal      MEDICATIONS  (STANDING):  amLODIPine   Tablet 10 milliGRAM(s) Oral daily  calcium acetate 667 milliGRAM(s) Oral four times a day with meals  chlorhexidine 4% Liquid 1 Application(s) Topical every 12 hours  docusate sodium 100 milliGRAM(s) Oral two times a day  furosemide   Injectable 40 milliGRAM(s) IV Push every 12 hours  heparin  Injectable 5000 Unit(s) SubCutaneous every 12 hours  hydrALAZINE 25 milliGRAM(s) Oral three times a day  nystatin Powder 1 Application(s) Topical every 12 hours  pantoprazole    Tablet 40 milliGRAM(s) Oral before breakfast  senna 2 Tablet(s) Oral at bedtime    MEDICATIONS  (PRN):  acetaminophen   Tablet .. 650 milliGRAM(s) Oral every 8 hours PRN Moderate Pain (4 - 6)  acetaminophen 300 mG/codeine 30 mG 1 Tablet(s) Oral every 4 hours PRN Severe Pain (7 - 10)  ALBUTerol    90 MICROgram(s) HFA Inhaler 2 Puff(s) Inhalation every 6 hours PRN Shortness of Breath and/or Wheezing  artificial  tears Solution 1 Drop(s) Both EYES every 6 hours PRN Dry Eyes      Allergies    iodine (Unknown)  meperidine (Rash)  oxycodone (Other; Nausea)    Intolerances        LABS:                        8.4    6.18  )-----------( 151      ( 24 Mar 2019 08:32 )             27.0     03-24    139  |  101  |  83<H>  ----------------------------<  96  4.7   |  31  |  1.95<H>    Ca    8.7      24 Mar 2019 08:32  Phos  3.5     03-24  Mg     2.1     03-24                CAPILLARY BLOOD GLUCOSE        pro-bnp [id number] 03-22 @ 06:21     d-dimer --  03-22 @ 06:21  pro-bnp [id number] 03-18 @ 06:40     d-dimer --  03-18 @ 06:40      RADIOLOGY & ADDITIONAL TESTS:    CXR:    Ct scan chest:    ekg;    echo:

## 2019-03-24 NOTE — PROGRESS NOTE ADULT - ASSESSMENT
1. Renal insufficiency  Chronic  Monitor labs.  Renal F/U  Renal US noted.   Continue meds  Avoid nephrotoxic drugs.  [place] cath in place    2. Hypercapnic respiratory failure  Likely 2nd to Copd vs CHF    XR 3/20 no significant change.  Bi-pap QHS, on O2 NC.   Bronchodilators  LABA/inhaled steroids  IV Lasix    DVT PPX  Follow up CXR    3. Anemia  Monitor H/H; today 8.3  Had 1u PRBC  Transfuse PRN  GI PPX    4. CHF  Diuretics  Cardio F/U  I&O's     5.Pulmonary HTN  Bronchodilators  oxygen supp  CCB

## 2019-03-24 NOTE — PROGRESS NOTE ADULT - ASSESSMENT
89 y/o F from [facility] w/ PMHx of GERD, HTN, anemia, colitis, rectal bleeding, chronic ankle swelling, presents to ED due to shortness of breath for 2 days, admitted for management of hypercapnic respiratory failure likely secondary to pulmonary edema and placed on NIV BIPAP.Pt was later downgraded to AI for further management.     Problem/Plan - 1:  ·  Problem: Acute hypercapnic respiratory failure.    Plan: Continue oxygen.  Encourage BIPAP usage at nighttime but pt has been refusing, if persists may need [facility] consult.  Patient may need NIV upon discharge due to severe kyphoscoliosis which is restricting her thoracic expansion.      Problem/Plan - 2:  ·  Problem: Acute on chronic diastolic heart failure.  Plan:   Continue diuretics- IV lasix 40 mg Q12.  cardiology - DR Medina     Problem/Plan - 3:  ·  Problem: Severe anemia.  Plan:   H&H stable.   will repeat labs in AM     Problem/Plan - 4:  ·  Problem: Need for prophylactic measure.  Plan:   Continue GI and DVT prophylaxis.

## 2019-03-24 NOTE — PROGRESS NOTE ADULT - ASSESSMENT
89 y/o F from [facility] w/ PMHx of GERD, anemia, colitis, rectal bleeding, chronic ankle swelling, presents to ED due to shortness of breath.  1.Bipap as per ICU.  2.Psych f/u.  3.IV lasix.  4.CRI-Renal f/u.  5.DM-Insulin.  6.HTN-cont bp medication.  7.GI and DVT prophylaxis.

## 2019-03-25 RX ORDER — FUROSEMIDE 40 MG
40 TABLET ORAL
Qty: 0 | Refills: 0 | Status: DISCONTINUED | OUTPATIENT
Start: 2019-03-25 | End: 2019-03-26

## 2019-03-25 RX ADMIN — Medication 667 MILLIGRAM(S): at 17:24

## 2019-03-25 RX ADMIN — Medication 100 MILLIGRAM(S): at 05:34

## 2019-03-25 RX ADMIN — Medication 667 MILLIGRAM(S): at 07:55

## 2019-03-25 RX ADMIN — Medication 667 MILLIGRAM(S): at 12:11

## 2019-03-25 RX ADMIN — Medication 25 MILLIGRAM(S): at 21:30

## 2019-03-25 RX ADMIN — Medication 100 MILLIGRAM(S): at 17:26

## 2019-03-25 RX ADMIN — NYSTATIN CREAM 1 APPLICATION(S): 100000 CREAM TOPICAL at 05:35

## 2019-03-25 RX ADMIN — Medication 40 MILLIGRAM(S): at 17:25

## 2019-03-25 RX ADMIN — Medication 40 MILLIGRAM(S): at 05:34

## 2019-03-25 RX ADMIN — Medication 25 MILLIGRAM(S): at 05:34

## 2019-03-25 RX ADMIN — HEPARIN SODIUM 5000 UNIT(S): 5000 INJECTION INTRAVENOUS; SUBCUTANEOUS at 05:35

## 2019-03-25 RX ADMIN — PANTOPRAZOLE SODIUM 40 MILLIGRAM(S): 20 TABLET, DELAYED RELEASE ORAL at 06:43

## 2019-03-25 RX ADMIN — CHLORHEXIDINE GLUCONATE 1 APPLICATION(S): 213 SOLUTION TOPICAL at 05:35

## 2019-03-25 RX ADMIN — HEPARIN SODIUM 5000 UNIT(S): 5000 INJECTION INTRAVENOUS; SUBCUTANEOUS at 17:25

## 2019-03-25 RX ADMIN — AMLODIPINE BESYLATE 10 MILLIGRAM(S): 2.5 TABLET ORAL at 05:34

## 2019-03-25 RX ADMIN — Medication 25 MILLIGRAM(S): at 13:38

## 2019-03-25 RX ADMIN — Medication 667 MILLIGRAM(S): at 21:30

## 2019-03-25 RX ADMIN — CHLORHEXIDINE GLUCONATE 1 APPLICATION(S): 213 SOLUTION TOPICAL at 17:25

## 2019-03-25 RX ADMIN — SENNA PLUS 2 TABLET(S): 8.6 TABLET ORAL at 21:30

## 2019-03-25 NOTE — PROGRESS NOTE ADULT - SUBJECTIVE AND OBJECTIVE BOX
DNR [x ]   DNI  [  ]    INTERVAL HPI/OVERNIGHT EVENTS: ***No overnight events    PRESSORS: [ ] YES [ ] NO  WHICH:    ANTIBIOTICS:                  DATE STARTED:      Cardiovascular:  Heart Failure  Acute   Acute on Chronic  Chronic       amLODIPine   Tablet 10 milliGRAM(s) Oral daily  furosemide   Injectable 40 milliGRAM(s) IV Push every 12 hours  hydrALAZINE 25 milliGRAM(s) Oral three times a day    Pulmonary:  ALBUTerol    90 MICROgram(s) HFA Inhaler 2 Puff(s) Inhalation every 6 hours PRN    Hematalogic:  heparin  Injectable 5000 Unit(s) SubCutaneous every 12 hours    Other:  acetaminophen   Tablet .. 650 milliGRAM(s) Oral every 8 hours PRN  acetaminophen 300 mG/codeine 30 mG 1 Tablet(s) Oral every 4 hours PRN  artificial  tears Solution 1 Drop(s) Both EYES every 6 hours PRN  calcium acetate 667 milliGRAM(s) Oral four times a day with meals  chlorhexidine 4% Liquid 1 Application(s) Topical every 12 hours  docusate sodium 100 milliGRAM(s) Oral two times a day  nystatin Powder 1 Application(s) Topical every 12 hours  pantoprazole    Tablet 40 milliGRAM(s) Oral before breakfast  senna 2 Tablet(s) Oral at bedtime    acetaminophen   Tablet .. 650 milliGRAM(s) Oral every 8 hours PRN  acetaminophen 300 mG/codeine 30 mG 1 Tablet(s) Oral every 4 hours PRN  ALBUTerol    90 MICROgram(s) HFA Inhaler 2 Puff(s) Inhalation every 6 hours PRN  amLODIPine   Tablet 10 milliGRAM(s) Oral daily  artificial  tears Solution 1 Drop(s) Both EYES every 6 hours PRN  calcium acetate 667 milliGRAM(s) Oral four times a day with meals  chlorhexidine 4% Liquid 1 Application(s) Topical every 12 hours  docusate sodium 100 milliGRAM(s) Oral two times a day  furosemide   Injectable 40 milliGRAM(s) IV Push every 12 hours  heparin  Injectable 5000 Unit(s) SubCutaneous every 12 hours  hydrALAZINE 25 milliGRAM(s) Oral three times a day  nystatin Powder 1 Application(s) Topical every 12 hours  pantoprazole    Tablet 40 milliGRAM(s) Oral before breakfast  senna 2 Tablet(s) Oral at bedtime    Drug Dosing Weight  Height (cm): 152.4 (13 Mar 2019 11:40)  Weight (kg): 58.5 (13 Mar 2019 11:40)  BMI (kg/m2): 25.2 (13 Mar 2019 11:40)  BSA (m2): 1.55 (13 Mar 2019 11:40)    CENTRAL LINE: [ ] YES [x ] NO  LOCATION:   DATE INSERTED:  REMOVE: [ ] YES [ ] NO  EXPLAIN:    GUZMAN: [ ] YES [x ] NO    DATE INSERTED:  REMOVE:  [ ] YES [ ] NO  EXPLAIN:    A-LINE:  [ ] YES [x ] NO  LOCATION:   DATE INSERTED:  REMOVE:  [ ] YES [ ] NO  EXPLAIN:    PAST MEDICAL & SURGICAL HISTORY:  Rectal bleeding  H/O gastroesophageal reflux (GERD)  Colitis  Anemia  CHF (congestive heart failure)  No significant past surgical history                    PHYSICAL EXAM:    GENERAL: NAD, well-groomed, well-developed  HEAD:  Atraumatic, Normocephalic  EYES: EOMI, PERRLA, conjunctiva and sclera clear  ENMT: No tonsillar erythema, exudates  NECK: Supple, No JVD  NERVOUS SYSTEM:  Alert & Oriented X3, follows commands, moving all extremities  CHEST/LUNG: Clear to percussion bilaterally; No rales, rhonchi, wheezing, or rubs  HEART: Regular rate and rhythm; No murmurs, rubs, or gallops  ABDOMEN: Soft, Nontender, Nondistended; Bowel sounds present  EXTREMITIES:  2+ Peripheral Pulses, No clubbing, cyanosis, or edema  LYMPH: No lymphadenopathy noted  SKIN: Pressure injury right heel  +Severe kyphoscoliosis    LABS:  CBC Full  -  ( 24 Mar 2019 08:32 )  WBC Count : 6.18 K/uL  Hemoglobin : 8.4 g/dL  Hematocrit : 27.0 %  Platelet Count - Automated : 151 K/uL  Mean Cell Volume : 100.4 fl  Mean Cell Hemoglobin : 31.2 pg  Mean Cell Hemoglobin Concentration : 31.1 gm/dL  Auto Neutrophil # : 4.52 K/uL  Auto Lymphocyte # : 0.72 K/uL  Auto Monocyte # : 0.86 K/uL  Auto Eosinophil # : 0.02 K/uL  Auto Basophil # : 0.01 K/uL  Auto Neutrophil % : 73.1 %  Auto Lymphocyte % : 11.7 %  Auto Monocyte % : 13.9 %  Auto Eosinophil % : 0.3 %  Auto Basophil % : 0.2 %    03-24    139  |  101  |  83<H>  ----------------------------<  96  4.7   |  31  |  1.95<H>    Ca    8.7      24 Mar 2019 08:32  Phos  3.5     03-24  Mg     2.1     03-24                [  ]  DVT Prophylaxis  [  ]  Nutrition, Brand, Rate         Goal Rate         Abdominal Nutritional Status -  Malnutrition   Cachexia      Morbid Obesity BMI >/=40    RADIOLOGY & ADDITIONAL STUDIES:  ***  < from: Xray Chest 1 View- PORTABLE-Routine (03.20.19 @ 15:30) >   Since prior evaluation, no significant change is identified.   Heart size cannot be quantitated on this portable evaluation. No superior   mediastinal widening is identified. Airspace opacities bilaterally and   bilateral pleural effusions are without significant change. There is no   evidence for pneumothorax. No mediastinal shift is noted. Degenerative   change of the thoracic spine noted.    IMPRESSION: No significant interval change.    < end of copied text >    [  ] Goals of Care Discussion with Family/Proxy/Other           Elements of Conversation Discussed: Patient/Family understanding of current illness   Advanced Directives                                                                       Prognosis  Treatment Options  Care Aligned with patient's wishes                                             TIME SPENT: 35 minutes

## 2019-03-25 NOTE — PROGRESS NOTE ADULT - ASSESSMENT
89 y/o F from [facility] w/ PMHx of GERD, HTN, anemia, colitis, rectal bleeding, chronic ankle swelling, presents to ED due to shortness of breath for 2 days admitted for management of hypercapnic resp failure likely secondary to pulmonary edema and placed on NIV BIPAP.   3/18/19  Patient has made herself a DNR with trial of intubation if necessary.  3/20/19  Patient refusing to use BIPAP.  3/21/19  Dorothy coyle/florentin.

## 2019-03-25 NOTE — PROGRESS NOTE ADULT - ASSESSMENT
1. Renal insufficiency  Chronic  Monitor labs.  Renal F/U  Continue meds  Avoid nephrotoxic drugs.    2. Hypercapnic respiratory failure  Likely 2nd to Copd vs CHF    XR 3/20 no significant change.  BIPAP QHS - pt has been refusing  May need psych consult.  O2 supplement.   Bronchodilators  LABA/inhaled steroids  Diuretics   DVT PPX  Follow up CXR    3. Anemia  Monitor H/H; stable at 8.4 today.   Had 1u PRBC  Transfuse PRN  GI PPX    4. CHF  Diuretics  Cardio F/U  I&O's     5.Pulmonary HTN  Bronchodilators  Oxygen supp  CCB

## 2019-03-25 NOTE — PROGRESS NOTE ADULT - ASSESSMENT
89 y/o F from [facility] w/ PMHx of GERD, HTN, anemia, colitis, rectal bleeding, chronic ankle swelling, presents to ED due to shortness of breath for 2 days admitted for management of hypercapnic resp failure likely secondary to pulmonary edema and placed on NIV BIPAP.   3/18/19  Patient has made herself a DNR with trial of intubation if necessary.  3/20/19  Patient refusing to use BIPAP.  3/21/19  Dorothy cyole/florentin.

## 2019-03-25 NOTE — DISCHARGE NOTE PROVIDER - CARE PROVIDER_API CALL
Facility provider,   Phone: (   )    -  Fax: (   )    -  Follow Up Time:     Monty Baum ()  [facility]  [address] [place] [address], [address]  [place] [address]  Phone: [phone]  Fax: [phone]  Follow Up Time:

## 2019-03-25 NOTE — DISCHARGE NOTE PROVIDER - NSDCCPCAREPLAN_GEN_ALL_CORE_FT
PRINCIPAL DISCHARGE DIAGNOSIS  Diagnosis: Acute on chronic congestive heart failure, unspecified heart failure type  Assessment and Plan of Treatment: Continue taking cardiac medication.      SECONDARY DISCHARGE DIAGNOSES  Diagnosis: Acute hypercapnic respiratory failure  Assessment and Plan of Treatment: Continue using oxygen. BIPAP nightly. May benefit from NIV given her severe kyphoscoliosis which is limiting her thoracic expansion.    Diagnosis: Renal insufficiency  Assessment and Plan of Treatment: Continue to monitor renal function.    Diagnosis: Severe anemia  Assessment and Plan of Treatment: CBC is stable.

## 2019-03-25 NOTE — ADVANCED PRACTICE NURSE CONSULT - ASSESSMENT
This is a 89yr old female patient admitted for Heart Failure, presenting with a R. Heel intact DTI without drainage

## 2019-03-25 NOTE — PROGRESS NOTE ADULT - SUBJECTIVE AND OBJECTIVE BOX
Pt is awake, alert, lying in bed in NAD. No cough/SOB at present. Recommended to use BIPAP QHS but patient has been refusing.     INTERVAL HPI/OVERNIGHT EVENTS:      VITAL SIGNS:  T(F): 98.6 (03-25-19 @ 04:45)  HR: 76 (03-25-19 @ 09:00)  BP: 135/41 (03-25-19 @ 04:45)  RR: 18 (03-25-19 @ 04:45)  SpO2: 99% (03-25-19 @ 09:00)  Wt(kg): --  I&O's Detail          REVIEW OF SYSTEMS:    CONSTITUTIONAL:  No fevers, chills, sweats    HEENT:  Eyes:  No diplopia or blurred vision. ENT:  No earache, sore throat or runny nose.    CARDIOVASCULAR:  No pressure, squeezing, tightness, or heaviness about the chest; no palpitations.    RESPIRATORY:  Per HPI    GASTROINTESTINAL:  No abdominal pain, nausea, vomiting or diarrhea.    GENITOURINARY:  No dysuria, frequency or urgency.    NEUROLOGIC:  No paresthesias, fasciculations, seizures or weakness.    PSYCHIATRIC:  No disorder of thought or mood.      PHYSICAL EXAM:    Constitutional: Well developed and nourished  Eyes:Perrla  ENMT: normal  Neck:supple  Respiratory: good air entry  Cardiovascular: S1 S2 regular  Gastrointestinal: Soft, Non tender  Extremities: No edema  Vascular:normal  Neurological:Awake, alert  Musculoskeletal weak; bedbound.       MEDICATIONS  (STANDING):  amLODIPine   Tablet 10 milliGRAM(s) Oral daily  calcium acetate 667 milliGRAM(s) Oral four times a day with meals  chlorhexidine 4% Liquid 1 Application(s) Topical every 12 hours  docusate sodium 100 milliGRAM(s) Oral two times a day  furosemide   Injectable 40 milliGRAM(s) IV Push every 12 hours  heparin  Injectable 5000 Unit(s) SubCutaneous every 12 hours  hydrALAZINE 25 milliGRAM(s) Oral three times a day  nystatin Powder 1 Application(s) Topical every 12 hours  pantoprazole    Tablet 40 milliGRAM(s) Oral before breakfast  senna 2 Tablet(s) Oral at bedtime    MEDICATIONS  (PRN):  acetaminophen   Tablet .. 650 milliGRAM(s) Oral every 8 hours PRN Moderate Pain (4 - 6)  acetaminophen 300 mG/codeine 30 mG 1 Tablet(s) Oral every 4 hours PRN Severe Pain (7 - 10)  ALBUTerol    90 MICROgram(s) HFA Inhaler 2 Puff(s) Inhalation every 6 hours PRN Shortness of Breath and/or Wheezing  artificial  tears Solution 1 Drop(s) Both EYES every 6 hours PRN Dry Eyes      Allergies    iodine (Unknown)  meperidine (Rash)  oxycodone (Other; Nausea)    Intolerances        LABS:                        8.4    6.18  )-----------( 151      ( 24 Mar 2019 08:32 )             27.0     03-24    139  |  101  |  83<H>  ----------------------------<  96  4.7   |  31  |  1.95<H>    Ca    8.7      24 Mar 2019 08:32  Phos  3.5     03-24  Mg     2.1     03-24                CAPILLARY BLOOD GLUCOSE        pro-bnp [id number] 03-22 @ 06:21     d-dimer --  03-22 @ 06:21      RADIOLOGY & ADDITIONAL TESTS:    CXR:    Ct scan chest:    ekg;    echo:

## 2019-03-25 NOTE — PROGRESS NOTE ADULT - SUBJECTIVE AND OBJECTIVE BOX
CHIEF COMPLAINT:Patient is a 89y old  Female who presents with a chief complaint of shortness of breath (25 Mar 2019 16:02)    	  REVIEW OF SYSTEMS:  CONSTITUTIONAL: No fever, weight loss, or fatigue  EYES: No eye pain, visual disturbances, or discharge  ENMT:  No difficulty hearing, tinnitus, vertigo; No sinus or throat pain  NECK: No pain or stiffness  RESPIRATORY: No cough, wheezing, chills or hemoptysis; No Shortness of Breath  CARDIOVASCULAR: No chest pain, palpitations, passing out, dizziness, or leg swelling  GASTROINTESTINAL: No abdominal or epigastric pain. No nausea, vomiting, or hematemesis; No diarrhea or constipation. No melena or hematochezia.  GENITOURINARY: No dysuria, frequency, hematuria, or incontinence  NEUROLOGICAL: No headaches, memory loss, loss of strength, numbness, or tremors  SKIN: No itching, burning, rashes, or lesions   LYMPH Nodes: No enlarged glands  ENDOCRINE: No heat or cold intolerance; No hair loss  MUSCULOSKELETAL: No joint pain or swelling; No muscle, back, or extremity pain  PSYCHIATRIC: No depression, anxiety, mood swings, or difficulty sleeping  HEME/LYMPH: No easy bruising, or bleeding gums  ALLERY AND IMMUNOLOGIC: No hives or eczema	    [ ] All others negative	  [ ] Unable to obtain    PHYSICAL EXAM:  T(C): 37.1 (03-25-19 @ 20:57), Max: 37.1 (03-25-19 @ 20:57)  HR: 69 (03-25-19 @ 20:57) (65 - 76)  BP: 125/53 (03-25-19 @ 20:57) (125/53 - 144/86)  RR: 16 (03-25-19 @ 20:57) (16 - 18)  SpO2: 100% (03-25-19 @ 20:57) (99% - 100%)  Wt(kg): --  I&O's Summary      Appearance: Normal	  HEENT:   Normal oral mucosa, PERRL, EOMI	  Lymphatic: No lymphadenopathy  Cardiovascular: Normal S1 S2, No JVD, No murmurs, No edema  Respiratory: Lungs clear to auscultation	  Psychiatry: A & O x 3, Mood & affect appropriate  Gastrointestinal:  Soft, Non-tender, + BS	  Skin: No rashes, No ecchymoses, No cyanosis	  Neurologic: Non-focal  Extremities: Normal range of motion, No clubbing, cyanosis or edema  Vascular: Peripheral pulses palpable 2+ bilaterally    MEDICATIONS  (STANDING):  amLODIPine   Tablet 10 milliGRAM(s) Oral daily  calcium acetate 667 milliGRAM(s) Oral four times a day with meals  chlorhexidine 4% Liquid 1 Application(s) Topical every 12 hours  docusate sodium 100 milliGRAM(s) Oral two times a day  furosemide    Tablet 40 milliGRAM(s) Oral two times a day  heparin  Injectable 5000 Unit(s) SubCutaneous every 12 hours  hydrALAZINE 25 milliGRAM(s) Oral three times a day  nystatin Powder 1 Application(s) Topical every 12 hours  pantoprazole    Tablet 40 milliGRAM(s) Oral before breakfast  senna 2 Tablet(s) Oral at bedtime      TELEMETRY: 	    ECG:  	  RADIOLOGY:  OTHER: 	  	  CBC Full  -  ( 24 Mar 2019 08:32 )  WBC Count : 6.18 K/uL  Hemoglobin : 8.4 g/dL  Hematocrit : 27.0 %  Platelet Count - Automated : 151 K/uL  Mean Cell Volume : 100.4 fl  Mean Cell Hemoglobin : 31.2 pg  Mean Cell Hemoglobin Concentration : 31.1 gm/dL  Auto Neutrophil # : 4.52 K/uL  Auto Lymphocyte # : 0.72 K/uL  Auto Monocyte # : 0.86 K/uL  Auto Eosinophil # : 0.02 K/uL  Auto Basophil # : 0.01 K/uL  Auto Neutrophil % : 73.1 %  Auto Lymphocyte % : 11.7 %  Auto Monocyte % : 13.9 %  Auto Eosinophil % : 0.3 %  Auto Basophil % : 0.2 %        CARDIAC MARKERS:                              8.4    6.18  )-----------( 151      ( 24 Mar 2019 08:32 )             27.0       03-24    139  |  101  |  83<H>  ----------------------------<  96  4.7   |  31  |  1.95<H>    Ca    8.7      24 Mar 2019 08:32  Phos  3.5     03-24  Mg     2.1     03-24              proBNP: Serum Pro-Brain Natriuretic Peptide: 84305 pg/mL (03-22 @ 06:21)  Serum Pro-Brain Natriuretic Peptide: 19590 pg/mL (03-18 @ 06:40)  Serum Pro-Brain Natriuretic Peptide: 8592 pg/mL (03-13 @ 13:16)    Lipid Profile: Cholesterol 146  LDL 38    TG 25    HgA1c: Hemoglobin A1C, Whole Blood: 4.8 % (03-14 @ 10:39)    TSH: Thyroid Stimulating Hormone, Serum: 3.74 uU/mL (03-14 @ 06:39)

## 2019-03-25 NOTE — DISCHARGE NOTE PROVIDER - HOSPITAL COURSE
89 y/o F from [facility] w/ PMHx of GERD, HTN, anemia, colitis, rectal bleeding, chronic ankle swelling, presents to ED due to shortness of breath for 2 days admitted for management of hypercapnic resp failure likely secondary to pulmonary edema and placed on NIV BIPAP.     3/18/19  Patient has made herself a DNR with trial of intubation if necessary.    3/20/19  Patient refusing to use BIPAP.    3/21/19  Dorothy d/c.     3/24/19  Using BIPAP intermittently 89 y/o F from [facility] w/ PMHx of GERD, HTN, anemia, colitis, rectal bleeding, chronic ankle swelling, presents to ED due to shortness of breath for 2 days admitted for management of hypercapnic resp failure likely secondary to pulmonary edema and placed on NIV BIPAP.     3/18/19  Patient has made herself a DNR with trial of intubation if necessary.    3/20/19  Patient refusing to use BIPAP.    3/21/19  Dorothy d/c.     3/24/19  Using BIPAP intermittently    Using oxygen at 2 LPM 89 y/o F from [facility] w/ PMHx of GERD, HTN, anemia, colitis, rectal bleeding, chronic ankle swelling, presents to ED due to shortness of breath for 2 days admitted for management of hypercapnic resp failure likely secondary to pulmonary edema and placed on NIV BIPAP.     3/18/19  Patient has made herself a DNR with trial of intubation if necessary.    3/20/19  Patient refusing to use BIPAP.    3/21/19  Dorothy d/c.     3/24/19  Using BIPAP intermittently    Using oxygen at 2 LPM    3/26/19  Used BIPAP last night for 5 hours

## 2019-03-25 NOTE — PROGRESS NOTE ADULT - SUBJECTIVE AND OBJECTIVE BOX
CHIEF COMPLAINT:Patient is a 89y old  Female who presents with a chief complaint of shortness of breath.Pt appears comfortable.    	  REVIEW OF SYSTEMS:  CONSTITUTIONAL: No fever, weight loss, or fatigue  EYES: No eye pain, visual disturbances, or discharge  ENT:  No difficulty hearing, tinnitus, vertigo; No sinus or throat pain  NECK: No pain or stiffness  RESPIRATORY: No cough, wheezing, chills or hemoptysis; No Shortness of Breath  CARDIOVASCULAR: No chest pain, palpitations, passing out, dizziness, or leg swelling  GASTROINTESTINAL: No abdominal or epigastric pain. No nausea, vomiting, or hematemesis; No diarrhea or constipation. No melena or hematochezia.  GENITOURINARY: No dysuria, frequency, hematuria, or incontinence  NEUROLOGICAL: No headaches, memory loss, loss of strength, numbness, or tremors  SKIN: No itching, burning, rashes, or lesions   LYMPH Nodes: No enlarged glands  ENDOCRINE: No heat or cold intolerance; No hair loss  MUSCULOSKELETAL: No joint pain or swelling; No muscle, back, or extremity pain  PSYCHIATRIC: No depression, anxiety, mood swings, or difficulty sleeping  HEME/LYMPH: No easy bruising, or bleeding gums  ALLERGY AND IMMUNOLOGIC: No hives or eczema	      PHYSICAL EXAM:  T(C): 37 (03-25-19 @ 04:45), Max: 37.2 (03-24-19 @ 20:43)  HR: 76 (03-25-19 @ 09:00) (62 - 76)  BP: 135/41 (03-25-19 @ 04:45) (111/93 - 159/44)  RR: 18 (03-25-19 @ 04:45) (18 - 19)  SpO2: 99% (03-25-19 @ 09:00) (99% - 100%)        Appearance: Normal	  HEENT:   Normal oral mucosa, PERRL, EOMI	  Lymphatic: No lymphadenopathy  Cardiovascular: Normal S1 S2, No JVD, No murmurs, No edema  Respiratory: Lungs clear to auscultation	  Psychiatry: A & O x 3, Mood & affect appropriate  Gastrointestinal:  Soft, Non-tender, + BS	  Skin: No rashes, No ecchymoses, No cyanosis	  Neurologic: Non-focal  Extremities: Normal range of motion, No clubbing, cyanosis or edema  Vascular: Peripheral pulses palpable 2+ bilaterally    MEDICATIONS  (STANDING):  amLODIPine   Tablet 10 milliGRAM(s) Oral daily  calcium acetate 667 milliGRAM(s) Oral four times a day with meals  chlorhexidine 4% Liquid 1 Application(s) Topical every 12 hours  docusate sodium 100 milliGRAM(s) Oral two times a day  furosemide   Injectable 40 milliGRAM(s) IV Push every 12 hours  heparin  Injectable 5000 Unit(s) SubCutaneous every 12 hours  hydrALAZINE 25 milliGRAM(s) Oral three times a day  nystatin Powder 1 Application(s) Topical every 12 hours  pantoprazole    Tablet 40 milliGRAM(s) Oral before breakfast  senna 2 Tablet(s) Oral at bedtime      	  LABS:	 	                       8.4    6.18  )-----------( 151      ( 24 Mar 2019 08:32 )             27.0     03-24    139  |  101  |  83<H>  ----------------------------<  96  4.7   |  31  |  1.95<H>    Ca    8.7      24 Mar 2019 08:32  Phos  3.5     03-24  Mg     2.1     03-24      proBNP: Serum Pro-Brain Natriuretic Peptide: 62268 pg/mL (03-22 @ 06:21)  Serum Pro-Brain Natriuretic Peptide: 83386 pg/mL (03-18 @ 06:40)  Serum Pro-Brain Natriuretic Peptide: 8592 pg/mL (03-13 @ 13:16)    Lipid Profile: Cholesterol 146  LDL 38    TG 25    HgA1c: Hemoglobin A1C, Whole Blood: 4.8 % (03-14 @ 10:39)    TSH: Thyroid Stimulating Hormone, Serum: 3.74 uU/mL (03-14 @ 06:39)

## 2019-03-25 NOTE — ADVANCED PRACTICE NURSE CONSULT - RECOMMEDATIONS
-Leave the R. Heel open to air  -Elevate/float the patients heels using heel protectors and reposition the patient Q 2hrs using wedges or pillows

## 2019-03-26 VITALS
HEART RATE: 75 BPM | OXYGEN SATURATION: 99 % | TEMPERATURE: 98 F | DIASTOLIC BLOOD PRESSURE: 50 MMHG | SYSTOLIC BLOOD PRESSURE: 146 MMHG | RESPIRATION RATE: 17 BRPM

## 2019-03-26 DIAGNOSIS — J96.02 ACUTE RESPIRATORY FAILURE WITH HYPERCAPNIA: ICD-10-CM

## 2019-03-26 DIAGNOSIS — D64.9 ANEMIA, UNSPECIFIED: ICD-10-CM

## 2019-03-26 LAB
ANION GAP SERPL CALC-SCNC: 6 MMOL/L — SIGNIFICANT CHANGE UP (ref 5–17)
BUN SERPL-MCNC: 93 MG/DL — HIGH (ref 7–18)
CALCIUM SERPL-MCNC: 8.3 MG/DL — LOW (ref 8.4–10.5)
CHLORIDE SERPL-SCNC: 99 MMOL/L — SIGNIFICANT CHANGE UP (ref 96–108)
CO2 SERPL-SCNC: 31 MMOL/L — SIGNIFICANT CHANGE UP (ref 22–31)
CREAT SERPL-MCNC: 2.06 MG/DL — HIGH (ref 0.5–1.3)
CULTURE RESULTS: SIGNIFICANT CHANGE UP
GLUCOSE SERPL-MCNC: 96 MG/DL — SIGNIFICANT CHANGE UP (ref 70–99)
HCT VFR BLD CALC: 27.7 % — LOW (ref 34.5–45)
HGB BLD-MCNC: 8.4 G/DL — LOW (ref 11.5–15.5)
MCHC RBC-ENTMCNC: 30.2 PG — SIGNIFICANT CHANGE UP (ref 27–34)
MCHC RBC-ENTMCNC: 30.3 GM/DL — LOW (ref 32–36)
MCV RBC AUTO: 99.6 FL — SIGNIFICANT CHANGE UP (ref 80–100)
NRBC # BLD: 0 /100 WBCS — SIGNIFICANT CHANGE UP (ref 0–0)
PLATELET # BLD AUTO: 180 K/UL — SIGNIFICANT CHANGE UP (ref 150–400)
POTASSIUM SERPL-MCNC: 4.7 MMOL/L — SIGNIFICANT CHANGE UP (ref 3.5–5.3)
POTASSIUM SERPL-SCNC: 4.7 MMOL/L — SIGNIFICANT CHANGE UP (ref 3.5–5.3)
RBC # BLD: 2.78 M/UL — LOW (ref 3.8–5.2)
RBC # FLD: 14.2 % — SIGNIFICANT CHANGE UP (ref 10.3–14.5)
SODIUM SERPL-SCNC: 136 MMOL/L — SIGNIFICANT CHANGE UP (ref 135–145)
SPECIMEN SOURCE: SIGNIFICANT CHANGE UP
WBC # BLD: 6.1 K/UL — SIGNIFICANT CHANGE UP (ref 3.8–10.5)
WBC # FLD AUTO: 6.1 K/UL — SIGNIFICANT CHANGE UP (ref 3.8–10.5)

## 2019-03-26 PROCEDURE — 84156 ASSAY OF PROTEIN URINE: CPT

## 2019-03-26 PROCEDURE — 94660 CPAP INITIATION&MGMT: CPT

## 2019-03-26 PROCEDURE — 83880 ASSAY OF NATRIURETIC PEPTIDE: CPT

## 2019-03-26 PROCEDURE — 82436 ASSAY OF URINE CHLORIDE: CPT

## 2019-03-26 PROCEDURE — 80053 COMPREHEN METABOLIC PANEL: CPT

## 2019-03-26 PROCEDURE — 84133 ASSAY OF URINE POTASSIUM: CPT

## 2019-03-26 PROCEDURE — 97161 PT EVAL LOW COMPLEX 20 MIN: CPT

## 2019-03-26 PROCEDURE — 82550 ASSAY OF CK (CPK): CPT

## 2019-03-26 PROCEDURE — 82306 VITAMIN D 25 HYDROXY: CPT

## 2019-03-26 PROCEDURE — 86850 RBC ANTIBODY SCREEN: CPT

## 2019-03-26 PROCEDURE — 93971 EXTREMITY STUDY: CPT

## 2019-03-26 PROCEDURE — 93005 ELECTROCARDIOGRAM TRACING: CPT

## 2019-03-26 PROCEDURE — 85730 THROMBOPLASTIN TIME PARTIAL: CPT

## 2019-03-26 PROCEDURE — 76775 US EXAM ABDO BACK WALL LIM: CPT

## 2019-03-26 PROCEDURE — 86923 COMPATIBILITY TEST ELECTRIC: CPT

## 2019-03-26 PROCEDURE — 97530 THERAPEUTIC ACTIVITIES: CPT

## 2019-03-26 PROCEDURE — 83935 ASSAY OF URINE OSMOLALITY: CPT

## 2019-03-26 PROCEDURE — 71046 X-RAY EXAM CHEST 2 VIEWS: CPT

## 2019-03-26 PROCEDURE — 84484 ASSAY OF TROPONIN QUANT: CPT

## 2019-03-26 PROCEDURE — 90471 IMMUNIZATION ADMIN: CPT

## 2019-03-26 PROCEDURE — 97116 GAIT TRAINING THERAPY: CPT

## 2019-03-26 PROCEDURE — 84443 ASSAY THYROID STIM HORMONE: CPT

## 2019-03-26 PROCEDURE — 82607 VITAMIN B-12: CPT

## 2019-03-26 PROCEDURE — 84165 PROTEIN E-PHORESIS SERUM: CPT

## 2019-03-26 PROCEDURE — 84540 ASSAY OF URINE/UREA-N: CPT

## 2019-03-26 PROCEDURE — 82962 GLUCOSE BLOOD TEST: CPT

## 2019-03-26 PROCEDURE — 36415 COLL VENOUS BLD VENIPUNCTURE: CPT

## 2019-03-26 PROCEDURE — 94760 N-INVAS EAR/PLS OXIMETRY 1: CPT

## 2019-03-26 PROCEDURE — 87040 BLOOD CULTURE FOR BACTERIA: CPT

## 2019-03-26 PROCEDURE — 96374 THER/PROPH/DIAG INJ IV PUSH: CPT

## 2019-03-26 PROCEDURE — 86780 TREPONEMA PALLIDUM: CPT

## 2019-03-26 PROCEDURE — 83970 ASSAY OF PARATHORMONE: CPT

## 2019-03-26 PROCEDURE — 82272 OCCULT BLD FECES 1-3 TESTS: CPT

## 2019-03-26 PROCEDURE — 83690 ASSAY OF LIPASE: CPT

## 2019-03-26 PROCEDURE — 80048 BASIC METABOLIC PNL TOTAL CA: CPT

## 2019-03-26 PROCEDURE — 93970 EXTREMITY STUDY: CPT

## 2019-03-26 PROCEDURE — 84300 ASSAY OF URINE SODIUM: CPT

## 2019-03-26 PROCEDURE — 83036 HEMOGLOBIN GLYCOSYLATED A1C: CPT

## 2019-03-26 PROCEDURE — 84100 ASSAY OF PHOSPHORUS: CPT

## 2019-03-26 PROCEDURE — 84560 ASSAY OF URINE/URIC ACID: CPT

## 2019-03-26 PROCEDURE — 84155 ASSAY OF PROTEIN SERUM: CPT

## 2019-03-26 PROCEDURE — 36430 TRANSFUSION BLD/BLD COMPNT: CPT

## 2019-03-26 PROCEDURE — 80061 LIPID PANEL: CPT

## 2019-03-26 PROCEDURE — 85610 PROTHROMBIN TIME: CPT

## 2019-03-26 PROCEDURE — 83735 ASSAY OF MAGNESIUM: CPT

## 2019-03-26 PROCEDURE — 82553 CREATINE MB FRACTION: CPT

## 2019-03-26 PROCEDURE — 81001 URINALYSIS AUTO W/SCOPE: CPT

## 2019-03-26 PROCEDURE — 71045 X-RAY EXAM CHEST 1 VIEW: CPT

## 2019-03-26 PROCEDURE — 82746 ASSAY OF FOLIC ACID SERUM: CPT

## 2019-03-26 PROCEDURE — 99285 EMERGENCY DEPT VISIT HI MDM: CPT | Mod: 25

## 2019-03-26 PROCEDURE — 86901 BLOOD TYPING SEROLOGIC RH(D): CPT

## 2019-03-26 PROCEDURE — 82310 ASSAY OF CALCIUM: CPT

## 2019-03-26 PROCEDURE — 86900 BLOOD TYPING SEROLOGIC ABO: CPT

## 2019-03-26 PROCEDURE — 93306 TTE W/DOPPLER COMPLETE: CPT

## 2019-03-26 PROCEDURE — 97110 THERAPEUTIC EXERCISES: CPT

## 2019-03-26 PROCEDURE — 84145 PROCALCITONIN (PCT): CPT

## 2019-03-26 PROCEDURE — 85027 COMPLETE CBC AUTOMATED: CPT

## 2019-03-26 PROCEDURE — 82803 BLOOD GASES ANY COMBINATION: CPT

## 2019-03-26 PROCEDURE — 82570 ASSAY OF URINE CREATININE: CPT

## 2019-03-26 PROCEDURE — 94640 AIRWAY INHALATION TREATMENT: CPT

## 2019-03-26 PROCEDURE — P9040: CPT

## 2019-03-26 RX ORDER — CHOLECALCIFEROL (VITAMIN D3) 125 MCG
1 CAPSULE ORAL
Qty: 0 | Refills: 0 | COMMUNITY

## 2019-03-26 RX ORDER — FUROSEMIDE 40 MG
1 TABLET ORAL
Qty: 0 | Refills: 0 | COMMUNITY

## 2019-03-26 RX ORDER — DOCUSATE SODIUM 100 MG
1 CAPSULE ORAL
Qty: 0 | Refills: 0 | COMMUNITY
Start: 2019-03-26

## 2019-03-26 RX ORDER — CHOLECALCIFEROL (VITAMIN D3) 125 MCG
1 CAPSULE ORAL
Qty: 4 | Refills: 0 | OUTPATIENT
Start: 2019-03-26 | End: 2019-04-24

## 2019-03-26 RX ORDER — FUROSEMIDE 40 MG
1 TABLET ORAL
Qty: 0 | Refills: 0 | COMMUNITY
Start: 2019-03-26

## 2019-03-26 RX ORDER — PANTOPRAZOLE SODIUM 20 MG/1
1 TABLET, DELAYED RELEASE ORAL
Qty: 0 | Refills: 0 | COMMUNITY
Start: 2019-03-26

## 2019-03-26 RX ORDER — SENNA PLUS 8.6 MG/1
2 TABLET ORAL
Qty: 0 | Refills: 0 | COMMUNITY
Start: 2019-03-26

## 2019-03-26 RX ADMIN — Medication 25 MILLIGRAM(S): at 13:26

## 2019-03-26 RX ADMIN — CHLORHEXIDINE GLUCONATE 1 APPLICATION(S): 213 SOLUTION TOPICAL at 05:57

## 2019-03-26 RX ADMIN — Medication 667 MILLIGRAM(S): at 08:41

## 2019-03-26 RX ADMIN — Medication 25 MILLIGRAM(S): at 05:56

## 2019-03-26 RX ADMIN — Medication 40 MILLIGRAM(S): at 05:56

## 2019-03-26 RX ADMIN — Medication 1 TABLET(S): at 14:15

## 2019-03-26 RX ADMIN — AMLODIPINE BESYLATE 10 MILLIGRAM(S): 2.5 TABLET ORAL at 05:56

## 2019-03-26 RX ADMIN — Medication 100 MILLIGRAM(S): at 05:56

## 2019-03-26 RX ADMIN — HEPARIN SODIUM 5000 UNIT(S): 5000 INJECTION INTRAVENOUS; SUBCUTANEOUS at 05:56

## 2019-03-26 RX ADMIN — PANTOPRAZOLE SODIUM 40 MILLIGRAM(S): 20 TABLET, DELAYED RELEASE ORAL at 05:56

## 2019-03-26 RX ADMIN — Medication 1 TABLET(S): at 13:26

## 2019-03-26 RX ADMIN — Medication 667 MILLIGRAM(S): at 12:08

## 2019-03-26 NOTE — PROGRESS NOTE ADULT - PROBLEM SELECTOR PROBLEM 3
Severe anemia

## 2019-03-26 NOTE — PROGRESS NOTE ADULT - SUBJECTIVE AND OBJECTIVE BOX
CHIEF COMPLAINT:Patient is a 89y old  Female who presents with a chief complaint of shortness of breath.Pt appears comfortable.    	  REVIEW OF SYSTEMS:  CONSTITUTIONAL: No fever, weight loss, or fatigue  EYES: No eye pain, visual disturbances, or discharge  ENT:  No difficulty hearing, tinnitus, vertigo; No sinus or throat pain  NECK: No pain or stiffness  RESPIRATORY: No cough, wheezing, chills or hemoptysis; No Shortness of Breath  CARDIOVASCULAR: No chest pain, palpitations, passing out, dizziness, or leg swelling  GASTROINTESTINAL: No abdominal or epigastric pain. No nausea, vomiting, or hematemesis; No diarrhea or constipation. No melena or hematochezia.  GENITOURINARY: No dysuria, frequency, hematuria, or incontinence  NEUROLOGICAL: No headaches, memory loss, loss of strength, numbness, or tremors  SKIN: No itching, burning, rashes, or lesions   LYMPH Nodes: No enlarged glands  ENDOCRINE: No heat or cold intolerance; No hair loss  MUSCULOSKELETAL: No joint pain or swelling; No muscle, back, or extremity pain  PSYCHIATRIC: No depression, anxiety, mood swings, or difficulty sleeping  HEME/LYMPH: No easy bruising, or bleeding gums  ALLERGY AND IMMUNOLOGIC: No hives or eczema	      PHYSICAL EXAM:  T(C): 36.8 (03-26-19 @ 13:14), Max: 37.1 (03-25-19 @ 20:57)  HR: 75 (03-26-19 @ 13:14) (69 - 75)  BP: 146/50 (03-26-19 @ 13:14) (125/53 - 146/50)  RR: 17 (03-26-19 @ 13:14) (16 - 17)  SpO2: 99% (03-26-19 @ 13:14) (99% - 100%)      Appearance: Normal	  HEENT:   Normal oral mucosa, PERRL, EOMI	  Lymphatic: No lymphadenopathy  Cardiovascular: Normal S1 S2, No JVD, No murmurs, No edema  Respiratory: Lungs clear to auscultation	  Psychiatry: A & O x 3, Mood & affect appropriate  Gastrointestinal:  Soft, Non-tender, + BS	  Skin: No rashes, No ecchymoses, No cyanosis	  Neurologic: Non-focal  Extremities: Normal range of motion, No clubbing, cyanosis or edema  Vascular: Peripheral pulses palpable 2+ bilaterally    MEDICATIONS  (STANDING):  amLODIPine   Tablet 10 milliGRAM(s) Oral daily  calcium acetate 667 milliGRAM(s) Oral four times a day with meals  chlorhexidine 4% Liquid 1 Application(s) Topical every 12 hours  docusate sodium 100 milliGRAM(s) Oral two times a day  furosemide    Tablet 40 milliGRAM(s) Oral two times a day  heparin  Injectable 5000 Unit(s) SubCutaneous every 12 hours  hydrALAZINE 25 milliGRAM(s) Oral three times a day  nystatin Powder 1 Application(s) Topical every 12 hours  pantoprazole    Tablet 40 milliGRAM(s) Oral before breakfast  senna 2 Tablet(s) Oral at bedtime        	  LABS:	 	                      8.4    6.10  )-----------( 180      ( 26 Mar 2019 06:18 )             27.7     03-26    136  |  99  |  93<H>  ----------------------------<  96  4.7   |  31  |  2.06<H>    Ca    8.3<L>      26 Mar 2019 06:18      proBNP: Serum Pro-Brain Natriuretic Peptide: 33496 pg/mL (03-22 @ 06:21)  Serum Pro-Brain Natriuretic Peptide: 61248 pg/mL (03-18 @ 06:40)  Serum Pro-Brain Natriuretic Peptide: 8592 pg/mL (03-13 @ 13:16)    Lipid Profile: Cholesterol 146  LDL 38    TG 25    HgA1c: Hemoglobin A1C, Whole Blood: 4.8 % (03-14 @ 10:39)    TSH: Thyroid Stimulating Hormone, Serum: 3.74 uU/mL (03-14 @ 06:39)

## 2019-03-26 NOTE — PROGRESS NOTE ADULT - PROBLEM SELECTOR PLAN 4
Continue GI and DVT prophylaxis.
Continue GI and DVT prophylaxis.  OOB daily  PT F/U  Discharge planning  Discussed treatment plan with daughter.
Continue GI and DVT prophylaxis.  OOB daily  PT F/U  Discharge planning  Discussed treatment plan with daughter.
Continue GI and DVT prophylaxis.  OOB daily  PT F/U  Medically cleared for discharge.
Daily BMP  Renal ultrasound noted.
Daily BMP  Renal ultrasound noted.
Encourage patient to participate in PT.  Must get out of bed daily  Continue GI and DVT prophylaxis.
Encourage patient to participate in PT.  Must get out of bed daily  Continue GI and DVT prophylaxis.

## 2019-03-26 NOTE — PROGRESS NOTE ADULT - PROBLEM SELECTOR PLAN 3
Continue to monitor.  H&H stable.
Continue to monitor.  H&H stable.
Continue to monitor.  Has received 1U PRBC
Continue to monitor.  Has received 1U PRBC
H&H stable.  Continue to monitor
H&H stable.  Continue to monitor
H&H stable.  Continue to monitor.
Continue to monitor.  H&H stable.

## 2019-03-26 NOTE — PROGRESS NOTE ADULT - PROBLEM SELECTOR PLAN 2
Continue diuretics and other cardiac meds as per cardio.
Continue oxygen.  Encourage BIPAP usage at night.  Serum CO2 is trending higher.  Continue bronchodilators.  ABG in am  May need BIPAP upon discharge
Continue oxygen.  Encourage BIPAP usage at night.  Serum CO2 is trending higher.  Continue bronchodilators.  ABG in am  May need BIPAP upon discharge
Continue oxygen.  Encourage BIPAP usage at nighttime.  Patient may need NIV upon discharge due to severe kyphoscoliosis which is restricting her thoracic expansion. NIV will give her a pre-set tidal volume which would help resolve her hypercapnia and decrease readmission rate.

## 2019-03-26 NOTE — PROGRESS NOTE ADULT - SUBJECTIVE AND OBJECTIVE BOX
DNR [ x]   DNI  [  ]    INTERVAL HPI/OVERNIGHT EVENTS: ***No overnight events. Used BIPAP for 5 hours last night    PRESSORS: [ ] YES [x ] NO  WHICH:    ANTIBIOTICS:                  DATE STARTED:      Cardiovascular:  Heart Failure  Acute   Acute on Chronic  Chronic       amLODIPine   Tablet 10 milliGRAM(s) Oral daily  furosemide    Tablet 40 milliGRAM(s) Oral two times a day  hydrALAZINE 25 milliGRAM(s) Oral three times a day    Pulmonary:  ALBUTerol    90 MICROgram(s) HFA Inhaler 2 Puff(s) Inhalation every 6 hours PRN    Hematalogic:  heparin  Injectable 5000 Unit(s) SubCutaneous every 12 hours    Other:  acetaminophen   Tablet .. 650 milliGRAM(s) Oral every 8 hours PRN  acetaminophen 300 mG/codeine 30 mG 1 Tablet(s) Oral every 4 hours PRN  artificial  tears Solution 1 Drop(s) Both EYES every 6 hours PRN  calcium acetate 667 milliGRAM(s) Oral four times a day with meals  chlorhexidine 4% Liquid 1 Application(s) Topical every 12 hours  docusate sodium 100 milliGRAM(s) Oral two times a day  nystatin Powder 1 Application(s) Topical every 12 hours  pantoprazole    Tablet 40 milliGRAM(s) Oral before breakfast  senna 2 Tablet(s) Oral at bedtime    acetaminophen   Tablet .. 650 milliGRAM(s) Oral every 8 hours PRN  acetaminophen 300 mG/codeine 30 mG 1 Tablet(s) Oral every 4 hours PRN  ALBUTerol    90 MICROgram(s) HFA Inhaler 2 Puff(s) Inhalation every 6 hours PRN  amLODIPine   Tablet 10 milliGRAM(s) Oral daily  artificial  tears Solution 1 Drop(s) Both EYES every 6 hours PRN  calcium acetate 667 milliGRAM(s) Oral four times a day with meals  chlorhexidine 4% Liquid 1 Application(s) Topical every 12 hours  docusate sodium 100 milliGRAM(s) Oral two times a day  furosemide    Tablet 40 milliGRAM(s) Oral two times a day  heparin  Injectable 5000 Unit(s) SubCutaneous every 12 hours  hydrALAZINE 25 milliGRAM(s) Oral three times a day  nystatin Powder 1 Application(s) Topical every 12 hours  pantoprazole    Tablet 40 milliGRAM(s) Oral before breakfast  senna 2 Tablet(s) Oral at bedtime    Drug Dosing Weight  Height (cm): 152.4 (13 Mar 2019 11:40)  Weight (kg): 58.5 (13 Mar 2019 11:40)  BMI (kg/m2): 25.2 (13 Mar 2019 11:40)  BSA (m2): 1.55 (13 Mar 2019 11:40)    CENTRAL LINE: [ ] YES [x ] NO  LOCATION:   DATE INSERTED:  REMOVE: [ ] YES [ ] NO  EXPLAIN:    GUZMAN: [ ] YES [ x] NO    DATE INSERTED:  REMOVE:  [ ] YES [ ] NO  EXPLAIN:    A-LINE:  [ ] YES [x ] NO  LOCATION:   DATE INSERTED:  REMOVE:  [ ] YES [ ] NO  EXPLAIN:    PAST MEDICAL & SURGICAL HISTORY:  Rectal bleeding  H/O gastroesophageal reflux (GERD)  Colitis  Anemia  CHF (congestive heart failure)  No significant past surgical history                    PHYSICAL EXAM:    GENERAL: NAD, well-groomed, well-developed  HEAD:  Atraumatic, Normocephalic  EYES: EOMI, PERRLA, conjunctiva and sclera clear  ENMT: No tonsillar erythema, exudates  NECK: Supple, No JVD  NERVOUS SYSTEM:  Alert & Oriented X23, able to follow commands, moving all extremities  CHEST/LUNG: Diminished breath sounds bilateral bases  HEART: Regular rate and rhythm; No murmurs, rubs, or gallops  ABDOMEN: Soft, Nontender, Nondistended; Bowel sounds present  EXTREMITIES:  2+ Peripheral Pulses, No clubbing, cyanosis, or edema  LYMPH: No lymphadenopathy noted  SKIN: Pressure injury right heel  +Severe kyphoscoliosi        LABS:  CBC Full  -  ( 26 Mar 2019 06:18 )  WBC Count : 6.10 K/uL  Hemoglobin : 8.4 g/dL  Hematocrit : 27.7 %  Platelet Count - Automated : 180 K/uL  Mean Cell Volume : 99.6 fl  Mean Cell Hemoglobin : 30.2 pg  Mean Cell Hemoglobin Concentration : 30.3 gm/dL  Auto Neutrophil # : x  Auto Lymphocyte # : x  Auto Monocyte # : x  Auto Eosinophil # : x  Auto Basophil # : x  Auto Neutrophil % : x  Auto Lymphocyte % : x  Auto Monocyte % : x  Auto Eosinophil % : x  Auto Basophil % : x    03-26    136  |  99  |  93<H>  ----------------------------<  96  4.7   |  31  |  2.06<H>    Ca    8.3<L>      26 Mar 2019 06:18                [x  ]  DVT Prophylaxis  [  ]  Nutrition, Brand, Rate         Goal Rate         Abdominal Nutritional Status -  Malnutrition   Cachexia         RADIOLOGY & ADDITIONAL STUDIES:  ***    [  ] Goals of Care Discussion with Family/Proxy/Other           Elements of Conversation Discussed: Patient/Family understanding of current illness   Advanced Directives                                                                       Prognosis  Treatment Options  Care Aligned with patient's wishes                                             TIME SPENT: 35 minutes

## 2019-03-26 NOTE — PROGRESS NOTE ADULT - PROBLEM SELECTOR PROBLEM 2
Acute hypercapnic respiratory failure
Acute on chronic diastolic heart failure

## 2019-03-26 NOTE — PROGRESS NOTE ADULT - PROBLEM SELECTOR PLAN 1
Continue amlodipine.  PO lasix
Continue amlodipine.  Switch lasix to po BID.
Continue amlodipine.  Switch lasix to po BID.
Continue diuretics as per cardio.  Continue amlodipine,
Continue diuretics. Will switch to po tomorrow.  Repeat CXR pending.  Continue amlodipine
Continue diuretics. Will switch to po tomorrow.  Repeat CXR pending.  Continue amlodipine
Continue oxygen.  Encourage BIPAP usage at nighttime.  Patient may need NIV upon discharge due to severe kyphoscoliosis which is restricting her thoracic expansion. NIV will give her a pre-set tidal volume which would help resolve her hypercapnia and decrease readmission rate.
Continue diuretics as per cardio.  Continue amlodipine,

## 2019-03-26 NOTE — PROGRESS NOTE ADULT - SUBJECTIVE AND OBJECTIVE BOX
Patient is awake, alert, lying in bed in NAD. O2 NC in place. No cough/SOB    INTERVAL HPI/OVERNIGHT EVENTS:      VITAL SIGNS:  T(F): 98.2 (03-26-19 @ 13:14)  HR: 75 (03-26-19 @ 13:14)  BP: 146/50 (03-26-19 @ 13:14)  RR: 17 (03-26-19 @ 13:14)  SpO2: 99% (03-26-19 @ 13:14)  Wt(kg): --  I&O's Detail          REVIEW OF SYSTEMS:    CONSTITUTIONAL:  No fevers, chills, sweats    HEENT:  Eyes:  No diplopia or blurred vision. ENT:  No earache, sore throat or runny nose.    CARDIOVASCULAR:  No pressure, squeezing, tightness, or heaviness about the chest; no palpitations.    RESPIRATORY:  Per HPI    GASTROINTESTINAL:  No abdominal pain, nausea, vomiting or diarrhea.    GENITOURINARY:  No dysuria, frequency or urgency.    NEUROLOGIC:  No paresthesias, fasciculations, seizures or weakness.    PSYCHIATRIC:  No disorder of thought or mood.      PHYSICAL EXAM:    Constitutional: Well developed and nourished  Eyes:Perrla  ENMT: normal  Neck:supple  Respiratory: good air entry  Cardiovascular: S1 S2 regular  Gastrointestinal: Soft, Non tender  Extremities: No edema  Vascular:normal  Neurological:Awake, alert   Musculoskeletal:weak, bedbound.       MEDICATIONS  (STANDING):  amLODIPine   Tablet 10 milliGRAM(s) Oral daily  calcium acetate 667 milliGRAM(s) Oral four times a day with meals  chlorhexidine 4% Liquid 1 Application(s) Topical every 12 hours  docusate sodium 100 milliGRAM(s) Oral two times a day  furosemide    Tablet 40 milliGRAM(s) Oral two times a day  heparin  Injectable 5000 Unit(s) SubCutaneous every 12 hours  hydrALAZINE 25 milliGRAM(s) Oral three times a day  nystatin Powder 1 Application(s) Topical every 12 hours  pantoprazole    Tablet 40 milliGRAM(s) Oral before breakfast  senna 2 Tablet(s) Oral at bedtime    MEDICATIONS  (PRN):  acetaminophen   Tablet .. 650 milliGRAM(s) Oral every 8 hours PRN Moderate Pain (4 - 6)  acetaminophen 300 mG/codeine 30 mG 1 Tablet(s) Oral every 4 hours PRN Severe Pain (7 - 10)  ALBUTerol    90 MICROgram(s) HFA Inhaler 2 Puff(s) Inhalation every 6 hours PRN Shortness of Breath and/or Wheezing  artificial  tears Solution 1 Drop(s) Both EYES every 6 hours PRN Dry Eyes      Allergies    iodine (Unknown)  meperidine (Rash)  oxycodone (Other; Nausea)    Intolerances        LABS:                        8.4    6.10  )-----------( 180      ( 26 Mar 2019 06:18 )             27.7     03-26    136  |  99  |  93<H>  ----------------------------<  96  4.7   |  31  |  2.06<H>    Ca    8.3<L>      26 Mar 2019 06:18                CAPILLARY BLOOD GLUCOSE        pro-bnp [id number] 03-22 @ 06:21     d-dimer --  03-22 @ 06:21      RADIOLOGY & ADDITIONAL TESTS:    CXR:    Ct scan chest:    ekg;    echo:

## 2019-03-26 NOTE — DISCHARGE NOTE NURSING/CASE MANAGEMENT/SOCIAL WORK - NSDCDPATPORTLINK_GEN_ALL_CORE
You can access the [facility] Portal, offered by [facility], by registering with the following website: [email or web address]

## 2019-03-26 NOTE — PROGRESS NOTE ADULT - ATTENDING COMMENTS
Patient is seen and examined. Case reviewed with the medical team. Above note is appreciated. Will follow up clinically. Continue DVT prophylaxis. Case discussed with pulmonary and ICU attending. Spoke with daughter, Bryanna in detail. Overall prognosis is poor despite any medical intervention and the daughter is fully aware. BIPAP PRN.
Patient is seen and examined. Case reviewed with the medical team. Above note is appreciated. Will follow up clinically. Continue DVT prophylaxis. Case discussed with pulmonary.
Patient is seen and examined. Case reviewed with the medical team. Above note is appreciated. Will follow up clinically. Continue DVT prophylaxis. Case discussed with pulmonary. will follow up. Spoke with daughter. Patient with hypecapnic respiratory failure, stable. BIPAP prn.  CHF stable . continue lasix prn. Discharge planning when stable.
Patient is seen and examined. fully A x O X3 Case reviewed with the medical team. Above note is appreciated. Will follow up clinically. Continue DVT prophylaxis. Case discussed with pulmonary and ICU attending. Spoke with daughter, Bryanna in detail. Overall prognosis is poor despite any medical intervention and the daughter is fully aware. BIPAP PRN.
Patient is seen and examined. Case reviewed with the medical team. Above note is appreciated. Will follow up clinically. Continue DVT prophylaxis. Case discussed with pulmonary and ICU attending. Spoke with daughter, Bryanna in detail. Overall prognosis is poor despite any medical intervention and the daughter is fully aware.
Patient is seen and examined. Case reviewed with the medical team. Above note is appreciated. Will follow up clinically. Continue DVT prophylaxis. Case discussed with pulmonary in detail. will follow up clinically.
Please refer to my note from today.
· Assessment	  89 y/o F from [facility] w/ PMHx of GERD, HTN, anemia, colitis, rectal bleeding, chronic ankle swelling, presents to ED due to shortness of breath for 2 days, admitted for management of hypercapnic respiratory failure likely secondary to pulmonary edema and placed on NIV BIPAP.Pt was later downgraded to AI for further management.     Problem/Plan - 1:  ·  Problem: Acute hypercapnic respiratory failure.    Plan: Continue oxygen.  Encourage BIPAP usage at nighttime but pt has been refusing, if persists may need [facility] consult.  Patient may need NIV upon discharge due to severe kyphoscoliosis which is restricting her thoracic expansion.
89 y/o F from [facility] w/ PMHx of GERD, HTN, anemia, colitis, rectal bleeding, chronic ankle swelling, presents to ED due to shortness of breath for 2 days admitted for management of hypercapnic resp failure likely secondary to pulmonary edema and placed on NIV BIPAP.   3/18/19  Patient has made herself a DNR with trial of intubation if necessary.       Problem Selector:  PROBLEM DIAGNOSES  Problem: Acute on chronic diastolic heart failure  Assessment and Plan: Continue lasix IVP. May switch to po tomorrow.  Continue medication.  Repeat CXR tomorrow.    Problem: Acute hypercapnic respiratory failure  Assessment and Plan: Continue oxygen.  BIPAP nightly.  Repeat ABG in am.  Continue bronchodilators.
Patient seen and examined with resident, Addendum to above.    89 y/o female with history of HTN, GERD admitted to the hospital for shortness of breath. Admitted to the ICU as requiring NIV support. This morning appears comfortable post diuresis. Awake and following commands. Not in distress.     Assessment:  1. Acute hypercapnic respiratory failure - likely due to pulmonary edema. Improved with diuresis. More comfortable today. Aim for euvolemic volume status  2. Hypertension - Cont. Home BP medications  3. Acute renal dysfunction - unknown baseline Cr. Check urine electrolytes and US of the kidneys. Monitor urine out put with villalobos catheter. Nephrology consulted.   4. GERD: Cont. PPI  5. Vitamin D deficiency: Continue supplementation  6. HFpEF - Cardiology follow up. BP control. Avoid excessive diuresis as patient with moderate AS.    - DVT prophylaxis   - Goals of care discussion with family.   - PT evaluation  - 34 min of critical care time spent on this patient's care
Please refer to my note from today.
Please refer to my note from today.
89 y/o F from [facility] w/ PMHx of GERD, HTN, anemia, colitis, rectal bleeding, chronic ankle swelling, presents to ED due to shortness of breath for 2 days, admitted for management of hypercapnic respiratory failure likely secondary to pulmonary edema and placed on NIV BIPAP. Patient has made herself a DNR with trial of intubation if necessary. Patient refusing to use BIPAP.      Problem/Plan - 1:  ·  Problem: Acute hypercapnic respiratory failure.  Plan: Continue oxygen.  Encourage BIPAP usage at nighttime.  Patient may need NIV upon discharge due to severe kyphoscoliosis which is restricting her thoracic expansion. NIV will give her a pre-set tidal volume which would help resolve her hypercapnia and decrease readmission rate.
Patient is seen and examined. Case reviewed with the medical team. Above note is appreciated. Will follow up clinically. Continue DVT prophylaxis. Case discussed with ICU team. Patient states she feels better and agrees to go to short term rehab at [facility] before returning back to [facility].
Patient is seen and examined. Case reviewed with the medical team. Above note is appreciated. Will follow up clinically. Continue DVT prophylaxis. Case discussed with Pulmonary attending. Daughter is fully aware.
Patient seen and examined with resident, Addendum to above.    89 y/o female with history of HTN, GERD admitted to the hospital for shortness of breath.    Assessment:  1. Acute hypercapnic respiratory failure - likely due to pulmonary edema. Improved with diuresis. More comfortable off bipap. Aim for euvolemic volume status  2. Hypertension - Cont. Home BP medications  3. Acute renal dysfunction - unknown baseline Cr. Renal function stabilizing  4. GERD: Cont. PPI  5. Vitamin D deficiency: Continue supplementation  6. HFpEF - Cardiology follow up. BP control. Avoid excessive diuresis as patient with moderate AS. Cont. Medications.     - DVT prophylaxis   - Goals of care discussion with family.   - PT evaluation  - D/c planning
89 y/o F from [facility] w/ PMHx of GERD, HTN, anemia, colitis, rectal bleeding, chronic ankle swelling, presents to ED due to shortness of breath for 2 days, admitted for management of hypercapnic respiratory failure likely secondary to pulmonary edema and placed on NIV BIPAP. Patient has made herself a DNR with trial of intubation if necessary. Patient refusing to use BIPAP.
Patient seen and examined with resident, Addendum to above.    89 y/o female with history of HTN, GERD admitted to the hospital for shortness of breath.    Assessment:  1. Acute hypercapnic respiratory failure - likely due to pulmonary edema. Improved with diuresis. More comfortable off bipap. Aim for euvolemic volume status  2. Hypertension - Cont. Home BP medications  3. Acute renal dysfunction - unknown baseline Cr. Renal function stabilizing  4. GERD: Cont. PPI  5. Vitamin D deficiency: Continue supplementation  6. HFpEF - Cardiology follow up. BP control. Avoid excessive diuresis as patient with moderate AS. Cont. Medications.     - DVT prophylaxis   - D/c today to [facility]
Patient is seen and examined. Case reviewed with the medical team. Above note is appreciated. Will follow up clinically. Continue DVT prophylaxis. Case discussed with Pulmonary and ICU attending. Will follow up clinically. Daughter fully aware.
Assessment and Plan:   · Assessment	  89 y/o F from [facility] w/ PMHx of GERD, HTN, anemia, colitis, rectal bleeding, chronic ankle swelling, presents to ED due to shortness of breath for 2 days admitted for management of hypercapnic resp failure likely secondary to pulmonary edema and placed on NIV BIPAP.   3/18/19  Patient has made herself a DNR with trial of intubation if necessary.  3/20/19  Patient refusing to use BIPAP.     Problem/Plan - 1:  ·  Problem: Acute on chronic congestive heart failure, unspecified heart failure type.  Plan: Continue diuretics. Will switch to po tomorrow.  Repeat CXR pending.  Continue amlodipine.      Problem/Plan - 2:  ·  Problem: Acute hypercapnic respiratory failure.  Plan: Continue oxygen.  Encourage BIPAP usage at night.  Serum CO2 is trending higher.  Continue bronchodilators.  ABG in am  May need BIPAP upon discharge.
89 y/o F from [facility] w/ PMHx of GERD, HTN, anemia, colitis, rectal bleeding, chronic ankle swelling, presents to ED due to shortness of breath for 2 days admitted for management of hypercapnic resp failure likely secondary to pulmonary edema and placed on NIV BIPAP.   3/18/19  Patient has made herself a DNR with trial of intubation if necessary.  3/20/19  Patient refusing to use BIPAP.  3/21/19  Dorothy d/florentin.      Problem/Plan - 1:  ·  Problem: Acute on chronic diastolic heart failure.  Plan: Continue diuretics as per cardio.

## 2019-03-26 NOTE — PROGRESS NOTE ADULT - REASON FOR ADMISSION
shortness of breath
shortness of breath. Daughter was not able to convince mother to come to the hospital sooner and spent the last evening before admission in the mothers room.
shortness of breath

## 2019-03-26 NOTE — PROGRESS NOTE ADULT - ASSESSMENT
89 y/o F from [facility] w/ PMHx of GERD, HTN, anemia, colitis, rectal bleeding, chronic ankle swelling, presents to ED due to shortness of breath for 2 days admitted for management of hypercapnic resp failure likely secondary to pulmonary edema and placed on NIV BIPAP.   3/18/19  Patient has made herself a DNR with trial of intubation if necessary.  3/20/19  Patient refusing to use BIPAP.  3/21/19  Dorothy d/c.   3/26/19 Used BIPAP for 5 hours last night 89 y/o F from [facility] w/ PMHx of GERD, HTN, anemia, colitis, rectal bleeding, chronic ankle swelling, presents to ED due to shortness of breath for 2 days admitted for management of hypercapnic resp failure likely secondary to pulmonary edema and placed on NIV BIPAP.   3/18/19  Patient has made herself a DNR with trial of intubation if necessary.  3/20/19  Patient refusing to use BIPAP.  3/21/19  Dorothy d/c.   3/26/19 Used BIPAP for 5 hours last night. Compliant with medications and PT.

## 2019-03-26 NOTE — PROGRESS NOTE ADULT - ASSESSMENT
1. Renal insufficiency  Chronic  Monitor labs.  Renal F/U  Continue meds  Avoid nephrotoxic drugs.    2. Hypercapnic respiratory failure  Likely 2nd to Copd vs CHF    XR 3/20 no significant change.  BIPAP QHS - pt has been refusing  O2 supplement.   Bronchodilators  LABA/inhaled steroids  Diuretics   DVT PPX  D/C planning when stable.     3. Anemia  Monitor H/H; stable at 8.4 today.   Had 1u PRBC  Transfuse PRN  GI PPX    4. CHF  Diuretics  Cardio F/U  I&O's     5.Pulmonary HTN  Bronchodilators  Oxygen supp  CCB

## 2019-03-26 NOTE — PROGRESS NOTE ADULT - ASSESSMENT
89 y/o F from [facility] w/ PMHx of GERD, anemia, colitis, rectal bleeding, chronic ankle swelling, presents to ED due to shortness of breath.  1.Bipap as per ICU.  2.Psych f/u.  3.lasix 40mg po bid.  4.CRI-Renal f/u.  5.DM-Insulin.  6.HTN-cont bp medication.  7.GI and DVT prophylaxis.

## 2019-05-24 NOTE — PHYSICAL THERAPY INITIAL EVALUATION ADULT - TRANSFER SKILLS, REHAB EVAL
Call us if you see blood in urine, have continued, persistant flank pain, or if you have UTI symptoms.    Continue to follow up with Dr. Morgan.      If you have any questions or concerns regarding your appointment today or your future appointment please contact our office at [phone]. Monday-Friday 8am-5pm    After hours for emergency please contact [phone] and ask to have on call urologist paged.    needs device/RW

## 2019-10-08 ENCOUNTER — EMERGENCY (EMERGENCY)
Facility: HOSPITAL | Age: 84
LOS: 1 days | Discharge: HOME | End: 2019-10-08
Attending: EMERGENCY MEDICINE
Payer: MEDICARE

## 2019-10-08 VITALS
DIASTOLIC BLOOD PRESSURE: 49 MMHG | HEART RATE: 60 BPM | SYSTOLIC BLOOD PRESSURE: 147 MMHG | RESPIRATION RATE: 16 BRPM | TEMPERATURE: 98 F | OXYGEN SATURATION: 98 %

## 2019-10-08 VITALS
SYSTOLIC BLOOD PRESSURE: 187 MMHG | DIASTOLIC BLOOD PRESSURE: 70 MMHG | RESPIRATION RATE: 16 BRPM | OXYGEN SATURATION: 99 % | WEIGHT: 115.08 LBS | TEMPERATURE: 97 F | HEART RATE: 67 BPM | HEIGHT: 63 IN

## 2019-10-08 PROBLEM — K62.5 HEMORRHAGE OF ANUS AND RECTUM: Chronic | Status: ACTIVE | Noted: 2019-03-14

## 2019-10-08 PROBLEM — Z87.19 PERSONAL HISTORY OF OTHER DISEASES OF THE DIGESTIVE SYSTEM: Chronic | Status: ACTIVE | Noted: 2019-03-14

## 2019-10-08 PROBLEM — K52.9 NONINFECTIVE GASTROENTERITIS AND COLITIS, UNSPECIFIED: Chronic | Status: ACTIVE | Noted: 2019-03-14

## 2019-10-08 PROBLEM — I50.9 HEART FAILURE, UNSPECIFIED: Chronic | Status: ACTIVE | Noted: 2019-03-13

## 2019-10-08 PROBLEM — D64.9 ANEMIA, UNSPECIFIED: Chronic | Status: ACTIVE | Noted: 2019-03-14

## 2019-10-08 LAB
ALBUMIN SERPL ELPH-MCNC: 3.3 G/DL — LOW (ref 3.5–5)
ALP SERPL-CCNC: 46 U/L — SIGNIFICANT CHANGE UP (ref 40–120)
ALT FLD-CCNC: 13 U/L DA — SIGNIFICANT CHANGE UP (ref 10–60)
ANION GAP SERPL CALC-SCNC: 7 MMOL/L — SIGNIFICANT CHANGE UP (ref 5–17)
AST SERPL-CCNC: 16 U/L — SIGNIFICANT CHANGE UP (ref 10–40)
BASOPHILS # BLD AUTO: 0 K/UL — SIGNIFICANT CHANGE UP (ref 0–0.2)
BASOPHILS NFR BLD AUTO: 0 % — SIGNIFICANT CHANGE UP (ref 0–2)
BILIRUB SERPL-MCNC: 0.3 MG/DL — SIGNIFICANT CHANGE UP (ref 0.2–1.2)
BUN SERPL-MCNC: 121 MG/DL — HIGH (ref 7–18)
CALCIUM SERPL-MCNC: 8.8 MG/DL — SIGNIFICANT CHANGE UP (ref 8.4–10.5)
CHLORIDE SERPL-SCNC: 109 MMOL/L — HIGH (ref 96–108)
CO2 SERPL-SCNC: 23 MMOL/L — SIGNIFICANT CHANGE UP (ref 22–31)
CREAT SERPL-MCNC: 2.4 MG/DL — HIGH (ref 0.5–1.3)
EOSINOPHIL # BLD AUTO: 0.03 K/UL — SIGNIFICANT CHANGE UP (ref 0–0.5)
EOSINOPHIL NFR BLD AUTO: 0.8 % — SIGNIFICANT CHANGE UP (ref 0–6)
ERYTHROCYTE [SEDIMENTATION RATE] IN BLOOD: 14 MM/HR — SIGNIFICANT CHANGE UP (ref 0–20)
GLUCOSE SERPL-MCNC: 135 MG/DL — HIGH (ref 70–99)
HCT VFR BLD CALC: 28.3 % — LOW (ref 34.5–45)
HGB BLD-MCNC: 9 G/DL — LOW (ref 11.5–15.5)
IMM GRANULOCYTES NFR BLD AUTO: 0.5 % — SIGNIFICANT CHANGE UP (ref 0–1.5)
LYMPHOCYTES # BLD AUTO: 1.67 K/UL — SIGNIFICANT CHANGE UP (ref 1–3.3)
LYMPHOCYTES # BLD AUTO: 42.4 % — SIGNIFICANT CHANGE UP (ref 13–44)
MCHC RBC-ENTMCNC: 30.4 PG — SIGNIFICANT CHANGE UP (ref 27–34)
MCHC RBC-ENTMCNC: 31.8 GM/DL — LOW (ref 32–36)
MCV RBC AUTO: 95.6 FL — SIGNIFICANT CHANGE UP (ref 80–100)
MONOCYTES # BLD AUTO: 0.52 K/UL — SIGNIFICANT CHANGE UP (ref 0–0.9)
MONOCYTES NFR BLD AUTO: 13.2 % — SIGNIFICANT CHANGE UP (ref 2–14)
NEUTROPHILS # BLD AUTO: 1.7 K/UL — LOW (ref 1.8–7.4)
NEUTROPHILS NFR BLD AUTO: 43.1 % — SIGNIFICANT CHANGE UP (ref 43–77)
NRBC # BLD: 0 /100 WBCS — SIGNIFICANT CHANGE UP (ref 0–0)
PLATELET # BLD AUTO: 114 K/UL — LOW (ref 150–400)
POTASSIUM SERPL-MCNC: 4.3 MMOL/L — SIGNIFICANT CHANGE UP (ref 3.5–5.3)
POTASSIUM SERPL-SCNC: 4.3 MMOL/L — SIGNIFICANT CHANGE UP (ref 3.5–5.3)
PROT SERPL-MCNC: 6.9 G/DL — SIGNIFICANT CHANGE UP (ref 6–8.3)
RBC # BLD: 2.96 M/UL — LOW (ref 3.8–5.2)
RBC # FLD: 15.5 % — HIGH (ref 10.3–14.5)
SODIUM SERPL-SCNC: 139 MMOL/L — SIGNIFICANT CHANGE UP (ref 135–145)
WBC # BLD: 3.94 K/UL — SIGNIFICANT CHANGE UP (ref 3.8–10.5)
WBC # FLD AUTO: 3.94 K/UL — SIGNIFICANT CHANGE UP (ref 3.8–10.5)

## 2019-10-08 PROCEDURE — 73630 X-RAY EXAM OF FOOT: CPT

## 2019-10-08 PROCEDURE — 86140 C-REACTIVE PROTEIN: CPT

## 2019-10-08 PROCEDURE — 73630 X-RAY EXAM OF FOOT: CPT | Mod: 26,RT

## 2019-10-08 PROCEDURE — 99284 EMERGENCY DEPT VISIT MOD MDM: CPT | Mod: 25

## 2019-10-08 PROCEDURE — 36415 COLL VENOUS BLD VENIPUNCTURE: CPT

## 2019-10-08 PROCEDURE — 85027 COMPLETE CBC AUTOMATED: CPT

## 2019-10-08 PROCEDURE — 71045 X-RAY EXAM CHEST 1 VIEW: CPT

## 2019-10-08 PROCEDURE — 85652 RBC SED RATE AUTOMATED: CPT

## 2019-10-08 PROCEDURE — 99284 EMERGENCY DEPT VISIT MOD MDM: CPT

## 2019-10-08 PROCEDURE — 80053 COMPREHEN METABOLIC PANEL: CPT

## 2019-10-08 PROCEDURE — 71045 X-RAY EXAM CHEST 1 VIEW: CPT | Mod: 26

## 2019-10-08 NOTE — ED PROVIDER NOTE - OBJECTIVE STATEMENT
Pertinent HPI/PMH/PSH/FHx/SHx and Review of Systems contained within  HPI: 89 yo female p/w CC right heel ulcer. She states that she was in the hospital in November for rectal bleeding, and has had the ulcer since then.   PMH/PSH relevant for: Hypertension (medications unknown), hysterectomy, "unclogging of fallopian tubes"   ROS negative for: fever, Chest pain, SOB, Nausea, vomiting, diarrhea, abdominal pain, dysuria, back pain     FamilyHx and SocialHx not otherwise contributory: quit smoking more than 50 years ago Pertinent HPI/PMH/PSH/FHx/SHx and Review of Systems contained within  HPI: 89 yo female p/w CC chronic right heel ulcer. Brought in my EMS from [facility] living due to physician at site believing her liquid draining heel was infected. She states that she was in the hospital in November 2018 for rectal bleeding, which led to right heel irritation and ulcer.   PMH/PSH relevant for: Hypertension (medications unknown), hysterectomy  ROS negative for: fever, Chest pain, SOB, Nausea, vomiting, diarrhea, abdominal pain, dysuria, back pain     FamilyHx and SocialHx not otherwise contributory: quit smoking more than 50 years ago Pertinent HPI/PMH/PSH/FHx/SHx and Review of Systems contained within  HPI: 89 yo F quentin from [facility] facility after MD there thought pt's  chronic right heel ulcer might be infected. ulcer at R heel has been draining clear liquid. pt states she's had the ulcere there since november 2018, has no pain at site. daughter at bedside states wound doesn't look worse than usual. has chronic b/l LE pitting edema, unchanged recently.  PMH/PSH relevant for: Hypertension, CHF on lasix  ROS negative for: fever, Chest pain, SOB, Orthopnea Nausea, vomiting, diarrhea, abdominal pain, dysuria, back pain     FamilyHx and SocialHx not otherwise contributory: quit smoking more than 50 years ago

## 2019-10-08 NOTE — ED ADULT NURSE NOTE - NSIMPLEMENTINTERV_GEN_ALL_ED
Implemented All Fall Risk Interventions:  [place] to call system. Call bell, personal items and telephone within reach. Instruct patient to call for assistance. Room bathroom lighting operational. Non-slip footwear when patient is off stretcher. Physically safe environment: no spills, clutter or unnecessary equipment. Stretcher in lowest position, wheels locked, appropriate side rails in place. Provide visual cue, wrist band, yellow gown, etc. Monitor gait and stability. Monitor for mental status changes and reorient to person, place, and time. Review medications for side effects contributing to fall risk. Reinforce activity limits and safety measures with patient and family.

## 2019-10-08 NOTE — ED PROVIDER NOTE - PMH
Anemia    CHF (congestive heart failure)    Colitis    H/O gastroesophageal reflux (GERD)    Hypertension, unspecified type    Rectal bleeding

## 2019-10-08 NOTE — ED PROVIDER NOTE - PHYSICAL EXAMINATION
*GEN: NAD; well appearing; A+O x3   *HEAD: NC/AT   *EYES/NOSE: PERRL & EOMI b/l  *THROAT: airway patent, moist mucous membranes  *NECK: Neck supple, no masses  *PULMONARY: CTA b/l, symmetric breath sounds.   *CARDIAC: s1s2, regular rhythm, no Murmur  *ABDOMEN:  ND, NT, soft, no guarding, no rebound, no masses, scars from previous abdominal surgery    *EXTREMITIES: symmetric pulses, 2+ dp & radial pulses, no cyanosis, bilateral lower leg edema  *SKIN: right heel ulcer, yellow rimmed and with eschar; tender to touch   *NEUROLOGIC: alert, moves all 4 extremities  *PSYCH: insight and judgment nl, memory nl, affect nl, thought nl *GEN: NAD; well appearing; A+O x3   *HEAD: NC/AT   *EYES/NOSE: PERRL & EOMI b/l  *THROAT: airway patent, moist mucous membranes  *NECK: Neck supple, no masses  *PULMONARY: CTA b/l, symmetric breath sounds.   *CARDIAC: s1s2, regular rhythm, no Murmur  *ABDOMEN:  ND, NT, soft, no guarding, no rebound, no masses, scars from previous abdominal surgery    *EXTREMITIES: symmetric pulses, 2+ dp & radial pulses, no cyanosis, bilateral lower leg edema  *SKIN: chronic right heel ulcer, draining liquid, yellow rimmed and with eschar; nontender to touch   *NEUROLOGIC: alert, moves all 4 extremities  *PSYCH: insight and judgment nl, memory nl, affect nl, thought nl *GEN: NAD; well appearing; A+O x3   *HEAD: NC/AT   *EYES/NOSE: PERRL & EOMI b/l  *THROAT: airway patent, moist mucous membranes  *NECK: Neck supple, no masses  *PULMONARY: CTA b/l, symmetric breath sounds.   *CARDIAC: s1s2, regular rhythm, no Murmur  *ABDOMEN:  ND, NT, soft, no guarding, no rebound, no masses, scars from previous abdominal surgery    *EXTREMITIES: symmetric pulses, 2+ dp & radial pulses, no cyanosis, bilateral lower leg edema  *SKIN: chronic right heel ulcer that is nontender to touch, not erythematous, no active drainage, no calor  *NEUROLOGIC: alert, moves all 4 extremities  *PSYCH: insight and judgment nl, memory nl, affect nl, thought nl

## 2019-10-08 NOTE — ED PROVIDER NOTE - NSFOLLOWUPINSTRUCTIONS_ED_ALL_ED_FT
FOLLOW UP WITH PMD  WITHIN 1-2DAYS, CALL TO MAKE APPOINTMENT  COME BACK TO ED IF YOUR CONDITION WORSENS OR IF YOU DEVELOP FEVER GREATER THAN 100.4F, CHEST PAIN,  SHORTNESS OF BREATH OR ANY OTHER SYMPTOMS CONCERNING TO YOU  TAKE TYLENOL (ACETAMINOPHEN) 650 MG EVERY 6 HOURS AS NEEDED FOR PAIN  TAKE IBUPROFEN (MOTRIN)  600 MG EVERY 6 HOURS AS NEEDED FOR PAIN  IF YOU WERE PRESCRIBED ANY MEDICATIONS FROM TODAY'S VISIT, TAKE THEM AS DIRECTED Diabetic Foot Ulcers    WHAT YOU NEED TO KNOW:    A diabetic foot ulcer can be redness over a bony area or an open sore. The ulcer can develop anywhere on your foot or toes. Ulcers usually develop on the bottom of the foot. You may not know you have an ulcer until you notice drainage on your sock. Drainage is fluid that may be yellow, brown, or red. The fluid may also contain pus or blood. Foot Ulcers         DISCHARGE INSTRUCTIONS:    Call your local emergency number (911 in the ) if:     You have a fever with chills.      You begin vomiting.      You feel faint or become confused.    Call your doctor if:     You see new drainage on your sock.      Your foot becomes red, warm, and swollen.       Your foot ulcer has a bad smell or is draining pus.       You feel pain in a foot that used to have little or no feeling.       You see black or dead tissue in or around your ulcer.       Your ulcer becomes bigger, deeper, or does not heal.       You have questions or concerns about your condition or care.     Medicines:     Antibiotics may be given to help treat or prevent a bacterial infection.      Take your medicine as directed. Contact your healthcare provider if you think your medicine is not helping or if you have side effects. Tell him or her if you are allergic to any medicine. Keep a list of the medicines, vitamins, and herbs you take. Include the amounts, and when and why you take them. Bring the list or the pill bottles to follow-up visits. Carry your medicine list with you in case of an emergency.    Care for your wound as directed: A bandage will be put on your ulcer. Your healthcare provider will give you instructions on changing your bandage. You may need to clean the wound and change the bandage daily. The bandage may contain medicines to help your ulcer heal. You may be asked to put medicine on your foot ulcer before putting on the bandage. The medicine may also prevent growth of tissue that is not healthy. You may need to cover your wound with a plastic bag while you bathe. Ask your healthcare provider for instructions on bathing until your foot heals.     Prevent diabetic foot ulcers: Good foot care may help prevent ulcers, or keep them from getting worse. Ask someone to help you if you are not able to check your feet by yourself. You or another person may need to do any of the following:     Keep your blood sugar levels under control. Continue the plan for your diabetes that you and your healthcare provider have discussed. Healthy food choices and taking your medicines as directed may help control blood sugars. Contact your healthcare provider if your blood sugar levels are higher than directed.      Wash your feet each day with soap and warm water. Do not use hot water, because this can injure your foot. Dry your feet gently with a towel after you wash them. Dry between and under your toes.       Apply lotion or a moisturizer on your dry feet. Ask your healthcare provider what lotions are best to use. Do not put lotion or moisturizer between your toes. Moisture between your toes could lead to skin breakdown.       Check your feet each day. Look at your whole foot, including the bottom, and between and under your toes. Check for wounds, corns, and calluses. Feel your feet by running your hands along the tops, bottoms, sides, and between your toes. Use a nonbreakable mirror to check your feet if you have trouble seeing the bottoms. Do not try to remove corns or calluses yourself. File or cut your toenails straight across.Diabetic  Foot Care           Protect your feet. Do not walk barefoot or wear your shoes without socks. Check your shoes for rocks or other objects that can hurt your feet. Wear cotton socks to help keep your feet dry. Wear socks without toe seams, or wear them with the seams inside out. Change your socks each day. Do not wear socks that are dirty or damp.      Wear shoes that fit well. Wear shoes that do not rub against any area of your feet. Your shoes should be ½ to ¾ inch (1 to 2 centimeters) longer than your feet. Your shoes should also have extra space around the widest part of your feet. Walking or athletic shoes with laces or straps that adjust are best. Ask your healthcare provider for help to choose shoes that fit you best. Ask him or her if you need to wear an insert, orthotic, or bandage on your feet.      Do not smoke. Nicotine can cause damage to your blood vessels and increases your risk for foot ulcers. Do not use e-cigarettes or smokeless tobacco in place of cigarettes or to help you quit. They still contain nicotine. Ask your healthcare provider for information if you currently smoke and need help quitting.      Know the risks if you choose to drink alcohol. Alcohol can cause your blood sugar levels to be low if you use insulin. Alcohol can cause high blood sugar levels and weight gain if you drink too much. Women 21 years or older and men 65 years or older should limit alcohol to 1 drink a day. Men aged 21 to 64 years should limit alcohol to 2 drinks a day. A drink of alcohol is 12 ounces of beer, 5 ounces of wine, or 1½ ounces of liquor.       Maintain a healthy weight. Ask your healthcare provider how much you should weigh. A healthy weight can help you control your diabetes. Ask him or her to help you create a weight loss plan if you are overweight. Even a 10 to 15 pound weight loss can help you manage your blood sugar level.     Follow up with your healthcare provider or foot specialist as directed: You may need to return often to have your wound checked. Your wound may be measured to see if it is getting smaller. Bring any offloading devices or footwear to your follow-up visits so your healthcare provider or specialist can check them. Write down your questions so you remember to ask them during your visits.

## 2019-10-08 NOTE — ED PROVIDER NOTE - NS ED ROS FT
Review of Systems:  	•	CONSTITUTIONAL: no fever  	•	SKIN: no rash, dry peeling skin. Right heel ulcer with yellow rims, tender to touch, eschar present   	•	RESPIRATORY: no shortness of breath  	•	CARDIAC: no chest pain, no palpitations  	•	GI:  no abd pain, no nausea, no vomiting, no diarrhea  	•	GENITO-URINARY:  no dysuria; no hematuria    	•	MUSCULOSKELETAL:  no back pain  	•	NEUROLOGIC: no weakness  	•	ALLERGY: no rhinitis  	•	PSYSCHIATRIC: no anxiety Review of Systems:  	•	CONSTITUTIONAL: no fever  	•	SKIN: no rash, dry peeling skin. Chronic right heel ulcer with yellow rims, nontender to touch, eschar present.  	•	RESPIRATORY: no shortness of breath  	•	CARDIAC: no chest pain, no palpitations  	•	GI:  no abd pain, no nausea, no vomiting, no diarrhea  	•	GENITO-URINARY:  no dysuria; no hematuria    	•	MUSCULOSKELETAL:  no back pain  	•	NEUROLOGIC: no weakness  	•	ALLERGY: no rhinitis  	•	PSYSCHIATRIC: no anxiety Review of Systems:  	•	CONSTITUTIONAL: no fever  	•	SKIN: no rash, dry peeling skin. Chronic right heel ulcer with yellow rims, nontender to touch  	•	RESPIRATORY: no shortness of breath  	•	CARDIAC: no chest pain, no palpitations; chronic b/l LE pitting edema  	•	GI:  no abd pain, no nausea, no vomiting, no diarrhea  	•	GENITO-URINARY:  no dysuria; no hematuria    	•	MUSCULOSKELETAL:  no back pain

## 2019-10-08 NOTE — ED PROVIDER NOTE - CLINICAL SUMMARY MEDICAL DECISION MAKING FREE TEXT BOX
89 year old female c/o right heel pressure ulcer. 89 year old female c/o chronic right heel ulcer. No signs of infection, but work-up with be done to rule it out.   1) Labs for inflammatory reaction screening   2) X-ray of the heel to screen for osteomyelitis   3) Patient to be discharged; should have outpatient follow-up with primary care physician 89 yo F with chronic right heel ulcer that is nontender to touch, not erythematous, no active drainage, no calor. doesn't appear infected.  will get R foot xray &  Labs to make sure no signs of occult infection.

## 2019-10-08 NOTE — ED PROVIDER NOTE - CHIEF COMPLAINT
The patient is a 89y Female complaining of right heel ulcer The patient is a 89y Female complaining of chronic right heel ulcer

## 2019-10-08 NOTE — ED PROVIDER NOTE - PATIENT PORTAL LINK FT
You can access the FollowMyHealth Patient Portal offered by [facility] by registering at the following website: [email or web address]. By joining [employer]’s FollowMyHealth portal, you will also be able to view your health information using other applications (apps) compatible with our system.

## 2019-10-08 NOTE — ED PROVIDER NOTE - PROGRESS NOTE DETAILS
Hansel Barfield: pt signed out to next Physician. answered all questions. PENDING: labs & R foot xray non infected ulcer, no wbc elev, no elev of sed rate or other complaints, no bone involvement or gas on XR, will dc w daughter.

## 2019-10-09 LAB — CRP SERPL-MCNC: <0.1 MG/DL — SIGNIFICANT CHANGE UP (ref 0–0.4)

## 2020-04-08 NOTE — ED PROVIDER NOTE - OBJECTIVE STATEMENT
89 y/o F pt w/ PMHx of DVT, PE, CVA presents to ED c/o shortness of breath, chest pain  x 2 days. Pt reports she is treated every 4 hours. Pt also c/o upper abdominal pain, bilateral leg swelling. Pt is on Lasix for CHF. Pt is a former smoker. NKDA
DISPLAY PLAN FREE TEXT

## 2020-05-05 NOTE — DISCHARGE NOTE NURSING/CASE MANAGEMENT/SOCIAL WORK - NSDCPEPTCAREGIVEDUMATLIST _GEN_ALL_CORE
Received request via: Pharmacy    Was the patient seen in the last year in this department? Yes LOV 4/9/2020    Does the patient have an active prescription (recently filled or refills available) for medication(s) requested? No  
Heart Failure

## 2020-07-13 NOTE — PROGRESS NOTE ADULT - PROVIDER SPECIALTY LIST ADULT
Cardiology Patient called c/o itching. She has no discharge. She used Monistat and had some burning with application but no burning, frequency, urgency, dysuria.    Last OV date: 12/16/2019  Recent Test/Labs: NA   Recommendations: Diflucan sent to pharmacy per radha

## 2020-07-18 NOTE — ED ADULT NURSE NOTE - CAS DISCH CONDITION
Implemented All Fall with Harm Risk Interventions:  [place] to call system. Call bell, personal items and telephone within reach. Instruct patient to call for assistance. Room bathroom lighting operational. Non-slip footwear when patient is off stretcher. Physically safe environment: no spills, clutter or unnecessary equipment. Stretcher in lowest position, wheels locked, appropriate side rails in place. Provide visual cue, wrist band, yellow gown, etc. Monitor gait and stability. Monitor for mental status changes and reorient to person, place, and time. Review medications for side effects contributing to fall risk. Reinforce activity limits and safety measures with patient and family. Provide visual clues: red socks. Improved

## 2020-12-15 NOTE — PATIENT PROFILE ADULT - BRADEN FRICTION AND SHEAR
Addended by: DREW WISEMAN on: 12/15/2020 09:51 AM     Modules accepted: Orders     (2) potential problem

## 2024-05-07 NOTE — ED ADULT TRIAGE NOTE - AS O2 DELIVERY
[Never] : never [TextBox_4] : The patient and a little cough and sputum was started on azithromycin after home visit.  Otherwise he is doing well.  He did not start physical therapy.  The cough is very rare.  He is swallowing well and slowly but there is no evidence of aspiration. supplemental O2